# Patient Record
Sex: FEMALE | Race: WHITE | NOT HISPANIC OR LATINO | URBAN - METROPOLITAN AREA
[De-identification: names, ages, dates, MRNs, and addresses within clinical notes are randomized per-mention and may not be internally consistent; named-entity substitution may affect disease eponyms.]

---

## 2021-03-15 ENCOUNTER — INPATIENT (INPATIENT)
Facility: HOSPITAL | Age: 86
LOS: 0 days | Discharge: HOME | DRG: 690 | End: 2021-03-16
Attending: HOSPITALIST | Admitting: HOSPITALIST
Payer: COMMERCIAL

## 2021-03-15 VITALS
OXYGEN SATURATION: 100 % | DIASTOLIC BLOOD PRESSURE: 49 MMHG | SYSTOLIC BLOOD PRESSURE: 102 MMHG | HEART RATE: 64 BPM | RESPIRATION RATE: 16 BRPM | TEMPERATURE: 98 F

## 2021-03-15 DIAGNOSIS — N39.0 URINARY TRACT INFECTION, SITE NOT SPECIFIED: ICD-10-CM

## 2021-03-15 DIAGNOSIS — E11.9 TYPE 2 DIABETES MELLITUS WITHOUT COMPLICATIONS: ICD-10-CM

## 2021-03-15 DIAGNOSIS — D72.829 ELEVATED WHITE BLOOD CELL COUNT, UNSPECIFIED: ICD-10-CM

## 2021-03-15 DIAGNOSIS — N17.9 ACUTE KIDNEY FAILURE, UNSPECIFIED: ICD-10-CM

## 2021-03-15 DIAGNOSIS — I63.9 CEREBRAL INFARCTION, UNSPECIFIED: ICD-10-CM

## 2021-03-15 DIAGNOSIS — D64.9 ANEMIA, UNSPECIFIED: ICD-10-CM

## 2021-03-15 DIAGNOSIS — I10 ESSENTIAL (PRIMARY) HYPERTENSION: ICD-10-CM

## 2021-03-15 DIAGNOSIS — R53.1 WEAKNESS: ICD-10-CM

## 2021-03-15 DIAGNOSIS — R63.8 OTHER SYMPTOMS AND SIGNS CONCERNING FOOD AND FLUID INTAKE: ICD-10-CM

## 2021-03-15 DIAGNOSIS — E86.0 DEHYDRATION: ICD-10-CM

## 2021-03-15 LAB
ACANTHOCYTES BLD QL SMEAR: SLIGHT — SIGNIFICANT CHANGE UP
ALBUMIN SERPL ELPH-MCNC: 2.9 G/DL — LOW (ref 3.3–5)
ALP SERPL-CCNC: 79 U/L — SIGNIFICANT CHANGE UP (ref 40–120)
ALT FLD-CCNC: 9 U/L — LOW (ref 10–45)
ANION GAP SERPL CALC-SCNC: 12 MMOL/L — SIGNIFICANT CHANGE UP (ref 5–17)
APPEARANCE UR: CLEAR — SIGNIFICANT CHANGE UP
APTT BLD: 57.1 SEC — HIGH (ref 27.5–35.5)
AST SERPL-CCNC: 20 U/L — SIGNIFICANT CHANGE UP (ref 10–40)
BACTERIA # UR AUTO: PRESENT /HPF
BASE EXCESS BLDV CALC-SCNC: -1.4 MMOL/L — SIGNIFICANT CHANGE UP
BASOPHILS # BLD AUTO: 0.15 K/UL — SIGNIFICANT CHANGE UP (ref 0–0.2)
BASOPHILS NFR BLD AUTO: 0.9 % — SIGNIFICANT CHANGE UP (ref 0–2)
BILIRUB SERPL-MCNC: 0.5 MG/DL — SIGNIFICANT CHANGE UP (ref 0.2–1.2)
BILIRUB UR-MCNC: ABNORMAL
BUN SERPL-MCNC: 26 MG/DL — HIGH (ref 7–23)
BURR CELLS BLD QL SMEAR: PRESENT — SIGNIFICANT CHANGE UP
CA-I SERPL-SCNC: 1.15 MMOL/L — SIGNIFICANT CHANGE UP (ref 1.12–1.3)
CALCIUM SERPL-MCNC: 8.7 MG/DL — SIGNIFICANT CHANGE UP (ref 8.4–10.5)
CHLORIDE SERPL-SCNC: 98 MMOL/L — SIGNIFICANT CHANGE UP (ref 96–108)
CO2 SERPL-SCNC: 20 MMOL/L — LOW (ref 22–31)
COLOR SPEC: ABNORMAL
COMMENT - URINE: SIGNIFICANT CHANGE UP
CREAT SERPL-MCNC: 1.59 MG/DL — HIGH (ref 0.5–1.3)
DIFF PNL FLD: NEGATIVE — SIGNIFICANT CHANGE UP
EOSINOPHIL # BLD AUTO: 0 K/UL — SIGNIFICANT CHANGE UP (ref 0–0.5)
EOSINOPHIL NFR BLD AUTO: 0 % — SIGNIFICANT CHANGE UP (ref 0–6)
EPI CELLS # UR: SIGNIFICANT CHANGE UP /HPF (ref 0–5)
GAS PNL BLDA: SIGNIFICANT CHANGE UP
GAS PNL BLDV: 129 MMOL/L — LOW (ref 138–146)
GAS PNL BLDV: SIGNIFICANT CHANGE UP
GLUCOSE SERPL-MCNC: 108 MG/DL — HIGH (ref 70–99)
GLUCOSE UR QL: NEGATIVE — SIGNIFICANT CHANGE UP
HCO3 BLDV-SCNC: 24 MMOL/L — SIGNIFICANT CHANGE UP (ref 20–27)
HCT VFR BLD CALC: 29.3 % — LOW (ref 34.5–45)
HGB BLD-MCNC: 9.7 G/DL — LOW (ref 11.5–15.5)
HYPOCHROMIA BLD QL: SLIGHT — SIGNIFICANT CHANGE UP
INR BLD: 2.35 — HIGH (ref 0.88–1.16)
KETONES UR-MCNC: ABNORMAL MG/DL
LACTATE SERPL-SCNC: 0.5 MMOL/L — SIGNIFICANT CHANGE UP (ref 0.5–2)
LEUKOCYTE ESTERASE UR-ACNC: ABNORMAL
LYMPHOCYTES # BLD AUTO: 1.6 K/UL — SIGNIFICANT CHANGE UP (ref 1–3.3)
LYMPHOCYTES # BLD AUTO: 9.6 % — LOW (ref 13–44)
MANUAL SMEAR VERIFICATION: SIGNIFICANT CHANGE UP
MCHC RBC-ENTMCNC: 28.3 PG — SIGNIFICANT CHANGE UP (ref 27–34)
MCHC RBC-ENTMCNC: 33.1 GM/DL — SIGNIFICANT CHANGE UP (ref 32–36)
MCV RBC AUTO: 85.4 FL — SIGNIFICANT CHANGE UP (ref 80–100)
METAMYELOCYTES # FLD: 0.9 % — HIGH (ref 0–0)
MONOCYTES # BLD AUTO: 0.72 K/UL — SIGNIFICANT CHANGE UP (ref 0–0.9)
MONOCYTES NFR BLD AUTO: 4.3 % — SIGNIFICANT CHANGE UP (ref 2–14)
NEUTROPHILS # BLD AUTO: 14.02 K/UL — HIGH (ref 1.8–7.4)
NEUTROPHILS NFR BLD AUTO: 84.3 % — HIGH (ref 43–77)
NITRITE UR-MCNC: NEGATIVE — SIGNIFICANT CHANGE UP
NT-PROBNP SERPL-SCNC: 3661 PG/ML — HIGH (ref 0–300)
OVALOCYTES BLD QL SMEAR: SLIGHT — SIGNIFICANT CHANGE UP
PCO2 BLDV: 46 MMHG — SIGNIFICANT CHANGE UP (ref 41–51)
PH BLDV: 7.34 — SIGNIFICANT CHANGE UP (ref 7.32–7.43)
PH UR: 5 — SIGNIFICANT CHANGE UP (ref 5–8)
PLAT MORPH BLD: ABNORMAL
PLATELET # BLD AUTO: 272 K/UL — SIGNIFICANT CHANGE UP (ref 150–400)
PO2 BLDV: 19 MMHG — SIGNIFICANT CHANGE UP
POIKILOCYTOSIS BLD QL AUTO: SIGNIFICANT CHANGE UP
POLYCHROMASIA BLD QL SMEAR: SLIGHT — SIGNIFICANT CHANGE UP
POTASSIUM BLDV-SCNC: 3.7 MMOL/L — SIGNIFICANT CHANGE UP (ref 3.5–4.9)
POTASSIUM SERPL-MCNC: 3.9 MMOL/L — SIGNIFICANT CHANGE UP (ref 3.5–5.3)
POTASSIUM SERPL-SCNC: 3.9 MMOL/L — SIGNIFICANT CHANGE UP (ref 3.5–5.3)
PROT SERPL-MCNC: 5.6 G/DL — LOW (ref 6–8.3)
PROT UR-MCNC: ABNORMAL MG/DL
PROTHROM AB SERPL-ACNC: 27.1 SEC — HIGH (ref 10.6–13.6)
RBC # BLD: 3.43 M/UL — LOW (ref 3.8–5.2)
RBC # FLD: 14.3 % — SIGNIFICANT CHANGE UP (ref 10.3–14.5)
RBC BLD AUTO: ABNORMAL
RBC CASTS # UR COMP ASSIST: < 5 /HPF — SIGNIFICANT CHANGE UP
SAO2 % BLDV: 23 % — SIGNIFICANT CHANGE UP
SARS-COV-2 RNA SPEC QL NAA+PROBE: SIGNIFICANT CHANGE UP
SCHISTOCYTES BLD QL AUTO: SLIGHT — SIGNIFICANT CHANGE UP
SODIUM SERPL-SCNC: 130 MMOL/L — LOW (ref 135–145)
SP GR SPEC: >=1.03 — SIGNIFICANT CHANGE UP (ref 1–1.03)
SPHEROCYTES BLD QL SMEAR: SLIGHT — SIGNIFICANT CHANGE UP
TROPONIN T SERPL-MCNC: 0.04 NG/ML — CRITICAL HIGH (ref 0–0.01)
TROPONIN T SERPL-MCNC: 0.04 NG/ML — CRITICAL HIGH (ref 0–0.01)
UROBILINOGEN FLD QL: 1 E.U./DL — SIGNIFICANT CHANGE UP
WBC # BLD: 16.63 K/UL — HIGH (ref 3.8–10.5)
WBC # FLD AUTO: 16.63 K/UL — HIGH (ref 3.8–10.5)
WBC UR QL: ABNORMAL /HPF

## 2021-03-15 PROCEDURE — 70450 CT HEAD/BRAIN W/O DYE: CPT | Mod: 26,MA

## 2021-03-15 PROCEDURE — 71045 X-RAY EXAM CHEST 1 VIEW: CPT | Mod: 26

## 2021-03-15 PROCEDURE — 99223 1ST HOSP IP/OBS HIGH 75: CPT | Mod: GC

## 2021-03-15 PROCEDURE — 93010 ELECTROCARDIOGRAM REPORT: CPT

## 2021-03-15 PROCEDURE — 99285 EMERGENCY DEPT VISIT HI MDM: CPT

## 2021-03-15 RX ORDER — CLOPIDOGREL BISULFATE 75 MG/1
75 TABLET, FILM COATED ORAL DAILY
Refills: 0 | Status: DISCONTINUED | OUTPATIENT
Start: 2021-03-15 | End: 2021-03-16

## 2021-03-15 RX ORDER — FAMOTIDINE 10 MG/ML
20 INJECTION INTRAVENOUS DAILY
Refills: 0 | Status: DISCONTINUED | OUTPATIENT
Start: 2021-03-15 | End: 2021-03-16

## 2021-03-15 RX ORDER — SODIUM CHLORIDE 9 MG/ML
1000 INJECTION INTRAMUSCULAR; INTRAVENOUS; SUBCUTANEOUS ONCE
Refills: 0 | Status: COMPLETED | OUTPATIENT
Start: 2021-03-15 | End: 2021-03-15

## 2021-03-15 RX ORDER — LEVETIRACETAM 250 MG/1
250 TABLET, FILM COATED ORAL
Refills: 0 | Status: DISCONTINUED | OUTPATIENT
Start: 2021-03-15 | End: 2021-03-16

## 2021-03-15 RX ORDER — ATORVASTATIN CALCIUM 80 MG/1
40 TABLET, FILM COATED ORAL AT BEDTIME
Refills: 0 | Status: DISCONTINUED | OUTPATIENT
Start: 2021-03-15 | End: 2021-03-16

## 2021-03-15 RX ORDER — LEVETIRACETAM 250 MG/1
750 TABLET, FILM COATED ORAL ONCE
Refills: 0 | Status: COMPLETED | OUTPATIENT
Start: 2021-03-15 | End: 2021-03-15

## 2021-03-15 RX ORDER — BUPROPION HYDROCHLORIDE 150 MG/1
150 TABLET, EXTENDED RELEASE ORAL DAILY
Refills: 0 | Status: DISCONTINUED | OUTPATIENT
Start: 2021-03-15 | End: 2021-03-16

## 2021-03-15 RX ORDER — CEFTRIAXONE 500 MG/1
1000 INJECTION, POWDER, FOR SOLUTION INTRAMUSCULAR; INTRAVENOUS EVERY 24 HOURS
Refills: 0 | Status: DISCONTINUED | OUTPATIENT
Start: 2021-03-15 | End: 2021-03-16

## 2021-03-15 RX ORDER — HEPARIN SODIUM 5000 [USP'U]/ML
5000 INJECTION INTRAVENOUS; SUBCUTANEOUS EVERY 8 HOURS
Refills: 0 | Status: DISCONTINUED | OUTPATIENT
Start: 2021-03-15 | End: 2021-03-16

## 2021-03-15 RX ADMIN — CEFTRIAXONE 100 MILLIGRAM(S): 500 INJECTION, POWDER, FOR SOLUTION INTRAMUSCULAR; INTRAVENOUS at 21:40

## 2021-03-15 RX ADMIN — HEPARIN SODIUM 5000 UNIT(S): 5000 INJECTION INTRAVENOUS; SUBCUTANEOUS at 21:28

## 2021-03-15 RX ADMIN — SODIUM CHLORIDE 1000 MILLILITER(S): 9 INJECTION INTRAMUSCULAR; INTRAVENOUS; SUBCUTANEOUS at 12:50

## 2021-03-15 RX ADMIN — LEVETIRACETAM 400 MILLIGRAM(S): 250 TABLET, FILM COATED ORAL at 13:01

## 2021-03-15 RX ADMIN — ATORVASTATIN CALCIUM 40 MILLIGRAM(S): 80 TABLET, FILM COATED ORAL at 21:28

## 2021-03-15 NOTE — ED PROVIDER NOTE - PHYSICAL EXAMINATION
VITAL SIGNS: I have reviewed nursing notes and confirm.  CONSTITUTIONAL: Thin, elderly female; in no acute distress.   SKIN:  warm and dry, no acute rash.   HEAD:  normocephalic, atraumatic.  EYES: EOM intact; conjunctiva and sclera clear.  ENT: No nasal discharge; airway clear.   NECK: Supple; non tender.  CARD: S1, S2 normal; no murmurs, gallops, or rubs. Regular rate and rhythm.   RESP:  Clear to auscultation b/l, no wheezes, rales or rhonchi.  ABD: Normal bowel sounds; soft; non-distended; non-tender; no guarding/ rebound.  EXT: Normal ROM. No clubbing, cyanosis or edema. 2+ pulses to b/l ue/le.  NEURO: Right sided gaze preference. Awakens when spoken to. Firm right hand grasp. Unable to move RLE and LLE. Weakness of the LUE compared to the RUE.   PSYCH: Cooperative, mood and affect appropriate. VITAL SIGNS: I have reviewed nursing notes and confirm.  CONSTITUTIONAL: Thin, elderly female; in no acute distress.   SKIN:  warm and dry, no acute rash.   HEAD:  normocephalic, atraumatic.  EYES: conjunctiva and sclera clear. + r sided gaze preference.  ENT: No nasal discharge; airway clear. Dry mm.  NECK: Supple; non tender.  CARD: S1, S2 normal; no murmurs, gallops, or rubs. Regular rate and rhythm.   RESP:  Clear to auscultation b/l, no wheezes, rales or rhonchi.  ABD: Normal bowel sounds; soft; non-distended; non-tender; no guarding/ rebound.  EXT: No clubbing, cyanosis or edema. 2+ pulses to b/l ue/le.  NEURO: + Right sided gaze preference. Opens eyes to sternal rub and when spoken to. + Firm right hand grasp. Unable to move RLE. + LUE/ LLE weakness 2/2 prior cva.

## 2021-03-15 NOTE — H&P ADULT - PROBLEM SELECTOR PLAN 7
-on home HCTZ 25mg and ramipril 20mg  -holding antihypertensives given pts dehydration and possible LYSSA  -restart as appropriate -Pt with history of prior CVA with residual L sided deficits  -c/w ASA 81mg, Plavix 75mg, Atorvastatin 40mg  -Seen by Stroke Neurology on this admission, NIHSS 18    #MDD  -c/w bupropion 150mg

## 2021-03-15 NOTE — H&P ADULT - NSHPLABSRESULTS_GEN_ALL_CORE
LABS:                        9.7    16.63 )-----------( 272      ( 15 Mar 2021 13:32 )             29.3     03-15    130<L>  |  98  |  26<H>  ----------------------------<  108<H>  3.9   |  20<L>  |  1.59<H>    Ca    8.7      15 Mar 2021 13:32    TPro  5.6<L>  /  Alb  2.9<L>  /  TBili  0.5  /  DBili  x   /  AST  20  /  ALT  9<L>  /  AlkPhos  79  03-15    PT/INR - ( 15 Mar 2021 13:32 )   PT: 27.1 sec;   INR: 2.35          PTT - ( 15 Mar 2021 13:32 )  PTT:57.1 sec  Lactate, Blood: 0.5 mmol/L (03-15-21 @ 13:32)    CARDIAC MARKERS ( 15 Mar 2021 16:40 )  x     / 0.04 ng/mL / x     / x     / x      CARDIAC MARKERS ( 15 Mar 2021 13:32 )  x     / 0.04 ng/mL / x     / x     / x          Urinalysis Basic - ( 15 Mar 2021 15:35 )    Color: Brown / Appearance: Clear / SG: >=1.030 / pH: x  Gluc: x / Ketone: Trace mg/dL  / Bili: Small / Urobili: 1.0 E.U./dL   Blood: x / Protein: Trace mg/dL / Nitrite: NEGATIVE   Leuk Esterase: Trace / RBC: < 5 /HPF / WBC 5-10 /HPF   Sq Epi: x / Non Sq Epi: 0-5 /HPF / Bacteria: Present /HPF      Blood Gas Profile w/Lytes - Venous: Performed in Lab (03-15-21 @ 16:42)  Blood Gas Arterial - Lytes,H+H,iCa,Lact: Performed in Lab (03-15-21 @ 13:43)      EKG: Reviewed.    RADIOLOGY & ADDITIONAL TESTS: Reviewed.

## 2021-03-15 NOTE — ED PROVIDER NOTE - CLINICAL SUMMARY MEDICAL DECISION MAKING FREE TEXT BOX
Impression: Acute lethargy, hypotension at home, with right sided gaze preference; weakness to RLE noted on arrival to ED. Finger stick WNL. Stroke code activated. Pt seen by stroke team.  Last known normal? 10 PM last night when pt went to bed.   CT head is - for acute CVA or bleeding. Pt has chronic infarction to the MCA region. Symptoms are unlikely to be related to acute CVA as per Dr. Quigley.  ? Seizure causing right sided gaze preference.  will load with levETIRAcetam 750 mg. Additional workup including labs, UA, and chest xray. Impression: Acute lethargy, hypotension at home, with right sided gaze preference; weakness to RLE noted on arrival to ED. Finger stick WNL. Stroke code activated. Pt seen by stroke team.  Last known normal 10 PM last night when pt went to bed?   CT head is - for acute CVA or bleeding. Pt has chronic infarction to the MCA region. Symptoms are unlikely to be related to acute CVA as per Dr. Quigley.  Seizure causing right sided gaze preference?  will load with levETIRAcetam 750 mg. Additional workup including labs, UA, and chest xray. Impression: Acute lethargy, hypotension at home, with right sided gaze preference; weakness to RLE noted on arrival to ED. Finger stick WNL. Stroke code activated. Pt seen by stroke team.  ? Last known normal 10 PM last night when pt went to .  CT head is - for acute CVA or bleeding. Pt has chronic infarction to the MCA region. Symptoms are unlikely to be related to acute CVA as per Dr. Quigley.  ? Seizure causing right sided gaze preference.  will load with levETIRAcetam 750 mg. Additional workup including labs, UA, and chest xray. Impression: lethargy, weakness, borderline hypotension at home, with right sided gaze preference and weakness to RLE noted in ED. + h/o cva with L sided weakness. Finger stick WNL. Stroke code activated and pt seen by stroke team. Last known normal ? 10 PM last night when pt went to sleep.  Non-con CT head is - for acute CVA or bleeding. CVA unlikely and pt is not a tPA candidate. CTA H/N and CTP cancelled. ? sx's 2/2 seizure. Pt loaded with keppra iv. Labs notable for leukocytosis to 16, yola, mild elev of trop. EKG non-ischemic. Pt hydrated with ivf with significant improvement of mental status. Suspect dehydration 2/2 profuse diarrhea last night. Case d/w stroke and epilepsy; will admit to regional medicine for further management. Impression: lethargy, weakness, borderline hypotension at home, with right sided gaze preference and weakness to RLE noted in ED. + h/o cva with L sided weakness. Finger stick WNL. Stroke code activated and pt seen by stroke team. Last known normal ? 10 PM last night when pt went to sleep.  Non-con CT head is - for acute CVA or bleeding. CVA unlikely and pt is not a tPA candidate. CTA H/N and CTP cancelled. ? sx's 2/2 seizure. Pt loaded with keppra iv. Labs notable for leukocytosis to 16, yola, mild elev of trop. EKG non-ischemic. Repeat trop unchanged. Do not suspect acs. Pt hydrated with ivf with significant improvement of mental status. Suspect dehydration 2/2 profuse diarrhea last night. Case d/w stroke and epilepsy; will admit to regional medicine for further management and work up including video eeg.

## 2021-03-15 NOTE — H&P ADULT - PROBLEM SELECTOR PLAN 1
Weakness likely secondary to dehydration 2/2 profuse diarrhea. Less likely acute CVA/TIA, less likely seizure, less likely cardiac event as EKG sinus nancie, trop stable, and pt back to baseline per HHA at bedside s/p fluid resuscitation 1L NS  -Stroke neuro consulted in ED, suggested possible seizure so pt was Keppra loaded  -low suspicion of seizure  -encourage PO intake, after bedside dysphagia screen  -continue to monitor for further episodes of diarrhea Weakness likely secondary to dehydration 2/2 profuse diarrhea. Less likely acute CVA/TIA, less likely seizure, less likely cardiac event as EKG sinus nancie, trop stable, and pt back to baseline per HHA at bedside s/p fluid resuscitation 1L NS  -Stroke neuro consulted in ED, suggested possible seizure so pt was Keppra loaded  -low suspicion of seizure  -f/u Stroke Neuro recs  -encourage PO intake, after bedside dysphagia screen  -continue to monitor for further episodes of diarrhea

## 2021-03-15 NOTE — ED ADULT TRIAGE NOTE - CHIEF COMPLAINT QUOTE
daniela from home.  HHA called ems.  Patient found non-verbal, hypotensive at around 830-9am.  Hx cva 3-4 months ago.  Per HHA patient also constipated x few days, given enema by night shift HHA, now is having diarrhea.  Patient has been bed-bound x few months

## 2021-03-15 NOTE — H&P ADULT - PROBLEM SELECTOR PLAN 10
F: maintenance fluids if pt fails dysphagia screen  E: replete PRN  N: mechanical soft diet if pt passes dysphagia screen, f/u speech and swallow eval  GI ppx: famotidine 20mg  DVT ppx: heparin subcu  CODE: DNR, need to clarify rest of GOC  DISPO: RMF

## 2021-03-15 NOTE — H&P ADULT - ATTENDING COMMENTS
reviewed pertinent data , h&p  patient seen and examined overnight ; HHA present at bedside, gave hx; pt had BM in ED prior to coming to floor, stool was formed followed by loose stools.     pt in NAD, noncooperative w/ exam; answers in yes or no answers to any questions, responds in different languages interspersed with English, nonsensical; refused to open eyes, on prying eyes open there appears to be conjunctival injection b/l, no overt discharge noted on eyelids; mm are slightly dry. s1s2, lungs cta b/l anteriorly ; abdomen soft/nt/nd; no lower ext edema/ tenderness b/l; pt w/ left arm contraction     1. weakness in setting of dehydration s/p diarrhea from enema use. concern for possible seizure as well possible UTI. on IVFs, hyponatremia improving, monitor BMP for sodium and renal fxn. c/w ceftriaxone, followup ctxs. monitor stools, if worsening diarrhea check stool studies including for c.diff. started on keppra per neuro recs. followup VEEG. pt w/ chronic constipation (can be constipated for up to a week, current episode 5-6 days), will need adequate bowel regimen prevent reccuring diarrheal episodes s/p enema (possible element of stool overflow incontinence from impaction ).        rest of  plan as above reviewed pertinent data , h&p  patient seen and examined overnight ; HHA present at bedside, gave hx; pt had BM in ED prior to coming to floor, stool was formed followed by loose stools.     pt in NAD, noncooperative w/ exam; answers in yes or no answers to any questions, responds in different languages interspersed with English, nonsensical; refused to open eyes, on prying eyes open there appears to be conjunctival injection b/l, no overt discharge noted on eyelids; mm are slightly dry. s1s2, lungs cta b/l anteriorly ; abdomen soft/nt/nd; no lower ext edema/ tenderness b/l; pt w/ left arm contraction     1. weakness in setting of dehydration s/p diarrhea from enema use. concern for possible seizure as well possible UTI. on IVFs, hyponatremia improving, monitor BMP for sodium and renal fxn. c/w ceftriaxone, followup ctxs. monitor stools, if worsening diarrhea check stool studies including for c.diff. started on keppra per neuro recs. followup VEEG. pt w/ chronic constipation (can be constipated for up to a week, current episode 5-6 days), will need adequate bowel regimen prevent reccuring diarrheal episodes s/p enema (possible element of stool overflow incontinence from impaction ).      2. elevated INR/ PTT: cause unclear, pt not on AC, possibly from malnutrition, coagulation d/o, acquired factor deficiency; monitor coags, may need heme evaluation, mixing studies     rest of  plan as above

## 2021-03-15 NOTE — H&P ADULT - PROBLEM SELECTOR PLAN 9
F: maintenance fluids if pt fails dysphagia screen  E: replete PRN  N: mechanical soft diet if pt passes dysphagia screen  GI ppx: famotidine 20mg  DVT ppx: heparin subcu  CODE: DNR, need to clarify rest of GOC  DISPO: NIKI F: maintenance fluids if pt fails dysphagia screen  E: replete PRN  N: mechanical soft diet if pt passes dysphagia screen, f/u speech and swallow eval  GI ppx: famotidine 20mg  DVT ppx: heparin subcu  CODE: DNR, need to clarify rest of GOC  DISPO: RMF -f/u a1c  -ISS if needed

## 2021-03-15 NOTE — CONSULT NOTE ADULT - ASSESSMENT
87F with PMHx of DM, HLD, CVA w L sided weakness BIBA from home with lethargy and weakness as noted by 24hr HHA. Per the aide, she had been constipated last night and given miralax and an enema and ended up with profuse diarrhea overnight, found to be difficult to rouse. Unclear last known well, patient was seen by overnight aid at 8pm was at baseline. She is MRS 5, bedbound at baseline. Overnight she was having extensive diarrhea. This morning aid noted that patient did not look well and was worried by /49 so brought patient to hospital. On ED provider exam noted right gaze preference and right leg weakness, stroke code called. NIHSS 18. HCT showed no acute infarct/hemorrhage, remote bilateral MCA distribution chronic areas of infarction without apparent site of acute cortical infarct. A lacunar infarct in the left thalamocapsular region is of indeterminate age. Loaded keppra 750mg in ED. Patient was mute unable to obtain ROS.  87F with PMHx of DM, HLD, CVA w L sided weakness BIBA from home with lethargy and weakness and hypotension as noted by 24hr HHA. Per the aide, she had been constipated last night and given miralax and an enema and ended up with profuse diarrhea overnight, found to be difficult to rouse. On ED provider exam noted right gaze preference and right leg weakness, stroke code called. NIHSS 18. HCT showed no acute infarct/hemorrhage, remote bilateral MCA distribution chronic areas of infarction without apparent site of acute cortical infarct. A lacunar infarct in the left thalamocapsular region is of indeterminate age. Loaded keppra 750mg in ED. Concern for seizures or possible metabolic encephalopathy less likely stroke.    - no need for further inpatient stroke workup  - s/p keppra 750mg load, start keppra 250mg BID  - obtain eeg  - Thank you for allowing us to participate in the care of this patient, please call Neurology with questions as needed  - dispo per ED

## 2021-03-15 NOTE — H&P ADULT - ASSESSMENT
87F with PMHx of DM, HLD, CVA w residual L sided weakness BIBA from home with lethargy and weakness after multiple episodes of diarrhea, admitted to [facility] for fluid resuscitation and further management.

## 2021-03-15 NOTE — H&P ADULT - HISTORY OF PRESENT ILLNESS
87F with PMHx of DM, HLD, CVA w L sided weakness BIBA from home with lethargy and weakness as noted by 24hr HHA. Per the aide, she had been constipated last night and given  87F with PMHx of DM, HLD, CVA w L sided weakness BIBA from home with lethargy and weakness as noted by 24hr HHA. Per the aide, she had been constipated last night and given miralax and an enema and ended up with profuse diarrhea overnight, found to be difficult to rouse.    ED Vitals: Tmax 98.2F HR 50-60s -120/40-50 RR 16 SpO2 100% on 2L NC  ED Labs: WBC 16.63 with neutrophilic predominance, H/H 9.7/29.3, BUN/Cr 26/1.59, UA: spec grav 1.030, trace leuk esterase WBC 5-10 Bacteria present  CTH: Remote bilateral MCA distribution chronic areas of infarction without apparent site of acute cortical infarct. A lacunar infarct in the left thalamocapsular region is of indeterminate age.  CXR: Heart size within normal limits, thoracic aortic calcification. Left basilar focal atelectasis, elevation of the left hemidiaphragm. Right perihilar focal atelectasis.. Thoracic spine and bilateral shoulder degenerative changes, levoscoliosis. Right [place] shoulder.  Interventions: 1L NS, 750mg Keppra History obtained from chart and HHA at bedside.  87F with PMHx of DM, HLD, CVA w residual L sided weakness BIBA from home with lethargy and weakness as noted by 24hr HHA. Per the aide, she had been constipated on Sunday, given miralax and an enema and ended up with profuse diarrhea overnight (>5 episodes, watery nonbloody), found to be hypotensive with SBP in the 90s, difficult to rouse this morning. Per HHA, pt normally has at least 1BM per week but requires weekly enemas. No recent sick contacts, no recent travel, no recent antibiotic use, no fevers, no URI symptoms, no CP, no SOB, no abd pain, no dysuria. Patient is bedbound, homebound with 24/7 HHA.     ED Vitals: Tmax 98.2F HR 50-60s -120/40-50 RR 16 SpO2 100% on 2L NC  ED Labs: WBC 16.63 with neutrophilic predominance, H/H 9.7/29.3, BUN/Cr 26/1.59, UA: spec grav 1.030, trace leuk esterase WBC 5-10 Bacteria present  CTH: Remote bilateral MCA distribution chronic areas of infarction without apparent site of acute cortical infarct. A lacunar infarct in the left thalamocapsular region is of indeterminate age.  CXR: Heart size within normal limits, thoracic aortic calcification. Left basilar focal atelectasis, elevation of the left hemidiaphragm. Right perihilar focal atelectasis.. Thoracic spine and bilateral shoulder degenerative changes, levoscoliosis. Right [place] shoulder.  Interventions: 1L NS, 750mg Keppra History obtained from chart and HHA at bedside.  87F with PMHx of DM, HLD, CVA w residual L sided weakness BIBA from home with lethargy and weakness as noted by 24hr HHA. Per the aide, she had been constipated on Sunday, given miralax and an enema and ended up with profuse diarrhea overnight (>5 episodes, watery nonbloody), found to be hypotensive with SBP in the 90s, difficult to rouse this morning. Per HHA, pt normally has at least 1BM per week but requires weekly enemas. No recent sick contacts, no recent travel, no recent antibiotic use, no fevers, no URI symptoms, no CP, no SOB, no abd pain, no dysuria. Patient is bedbound, homebound with 24/7 HHA.     ED Vitals: Tmax 98.2F HR 50-60s -120/40-50 RR 16 SpO2 100% on 2L NC  ED Labs: WBC 16.63 with neutrophilic predominance, H/H 9.7/29.3, BUN/Cr 26/1.59, UA: spec grav 1.030, trace leuk esterase WBC 5-10 Bacteria present  CTH: Remote bilateral MCA distribution chronic areas of infarction without apparent site of acute cortical infarct. A lacunar infarct in the left thalamocapsular region is of indeterminate age.  CXR: Heart size within normal limits, thoracic aortic calcification. Left basilar focal atelectasis, elevation of the left hemidiaphragm. Right perihilar focal atelectasis. Thoracic spine and bilateral shoulder degenerative changes, levoscoliosis. Right [place] shoulder.  Interventions: 1L NS, Stroke code was called in the ED, NIHSS 18, Pt loaded with 750mg Keppra for presumed seizures

## 2021-03-15 NOTE — H&P ADULT - PROBLEM SELECTOR PLAN 4
-H/H 9.7/29.3 (unknown baseline), normocytic anemia  -f/u iron studies -Cr 1.59 on admission, unknown baseline  -obtain collateral from PMD Dr. Jannette Winters [phone] in am  -continue to monitor BMP, next BMP 10pm  -encourage PO fluid intake after dysphagia screen, otherwise continue with gentle hydration  -f/u urine lytes    ADDENDUM:   #hyponatremia: monitor BMP s/p NS boluses to avoid sodium  overcorrection; start maintenance IVFs pending repeat sodium levels

## 2021-03-15 NOTE — CONSULT NOTE ADULT - SUBJECTIVE AND OBJECTIVE BOX
**STROKE CODE CONSULT NOTE**    Last known well time/Time of onset of symptoms: 3/14/2021 unknown    HPI: 87F with PMHx of DM, HLD, CVA w L sided weakness BIBA from home with lethargy and weakness as noted by 24hr HHA. Per the aide, she had been constipated last night and given miralax and an enema and ended up with profuse diarrhea overnight, found to be difficult to rouse. Unclear last known well, patient was seen by overnight aid at 8pm was at baseline. She is MRS 5, bedbound at baseline. Overnight she was having extensive diarrhea. This morning aid noted that patient did not look well and was worried by /49 so brought patient to hospital. On ED provider exam noted right gaze preference and right leg weakness, stroke code called. NIHSS 18. HCT showed no acute infarct/hemorrhage, remote bilateral MCA distribution chronic areas of infarction without apparent site of acute cortical infarct. A lacunar infarct in the left thalamocapsular region is of indeterminate age. Loaded keppra 750mg in ED. Patient was mute unable to obtain ROS.     T(C): 36.8 (03-15-21 @ 12:45), Max: 36.8 (03-15-21 @ 12:45)  HR: 64 (03-15-21 @ 18:30) (57 - 64)  BP: 131/59 (03-15-21 @ 18:30) (102/49 - 131/59)  RR: 17 (03-15-21 @ 18:30) (16 - 18)  SpO2: 100% (03-15-21 @ 18:30) (100% - 100%)    PAST MEDICAL & SURGICAL HISTORY:      FAMILY HISTORY:      SOCIAL HISTORY:      MEDICATIONS  (STANDING):  heparin   Injectable 5000 Unit(s) SubCutaneous every 8 hours    MEDICATIONS  (PRN):    Allergies    morphine (Other)    Intolerances      Vital Signs Last 24 Hrs  T(C): 36.8 (15 Mar 2021 12:45), Max: 36.8 (15 Mar 2021 12:45)  T(F): 98.2 (15 Mar 2021 12:45), Max: 98.2 (15 Mar 2021 12:45)  HR: 64 (15 Mar 2021 18:30) (57 - 64)  BP: 131/59 (15 Mar 2021 18:30) (102/49 - 131/59)  BP(mean): --  RR: 17 (15 Mar 2021 18:30) (16 - 18)  SpO2: 100% (15 Mar 2021 18:30) (100% - 100%)    Physical exam:  *************************************    NIHSS: 18 ASPECT Score: 7    Fingerstick Blood Glucose: CAPILLARY BLOOD GLUCOSE  118 (15 Mar 2021 19:26)      POCT Blood Glucose.: 118 mg/dL (15 Mar 2021 12:06)    LABS:                        9.7    16.63 )-----------( 272      ( 15 Mar 2021 13:32 )             29.3     03-15    130<L>  |  98  |  26<H>  ----------------------------<  108<H>  3.9   |  20<L>  |  1.59<H>    Ca    8.7      15 Mar 2021 13:32    TPro  5.6<L>  /  Alb  2.9<L>  /  TBili  0.5  /  DBili  x   /  AST  20  /  ALT  9<L>  /  AlkPhos  79  03-15    PT/INR - ( 15 Mar 2021 13:32 )   PT: 27.1 sec;   INR: 2.35          PTT - ( 15 Mar 2021 13:32 )  PTT:57.1 sec  CARDIAC MARKERS ( 15 Mar 2021 16:40 )  x     / 0.04 ng/mL / x     / x     / x      CARDIAC MARKERS ( 15 Mar 2021 13:32 )  x     / 0.04 ng/mL / x     / x     / x          Urinalysis Basic - ( 15 Mar 2021 15:35 )    Color: Brown / Appearance: Clear / SG: >=1.030 / pH: x  Gluc: x / Ketone: Trace mg/dL  / Bili: Small / Urobili: 1.0 E.U./dL   Blood: x / Protein: Trace mg/dL / Nitrite: NEGATIVE   Leuk Esterase: Trace / RBC: < 5 /HPF / WBC 5-10 /HPF   Sq Epi: x / Non Sq Epi: 0-5 /HPF / Bacteria: Present /HPF        RADIOLOGY & ADDITIONAL STUDIES:  < from: CT Brain Stroke Protocol (03.15.21 @ 13:52) >    IMPRESSION:    Remote bilateral MCA distribution chronic areas of infarction without apparent site of acute cortical infarct. A lacunar infarct in the left thalamocapsular region is of indeterminate age.    Study was performed at 12:13 PM. Results were discussed with NICOLE Lujan at 12:15 PM.    < end of copied text >      -----------------------------------------------------------------------------------------------------------------  IV-tPA (Y/N):   no out of window    **STROKE CODE CONSULT NOTE**    Last known well time/Time of onset of symptoms: 3/14/2021 unknown    HPI: 87F with PMHx of DM, HLD, CVA w L sided weakness BIBA from home with lethargy and weakness as noted by 24hr HHA. Per the aide, she had been constipated last night and given miralax and an enema and ended up with profuse diarrhea overnight, found to be difficult to rouse. Unclear last known well, patient was seen by overnight aid at 8pm was at baseline. She is MRS 5, bedbound at baseline. Overnight she was having extensive diarrhea. This morning aid noted that patient did not look well and was worried by /49 so brought patient to hospital. On ED provider exam noted right gaze preference and right leg weakness, stroke code called. NIHSS 18. HCT showed no acute infarct/hemorrhage, remote bilateral MCA distribution chronic areas of infarction without apparent site of acute cortical infarct. A lacunar infarct in the left thalamocapsular region is of indeterminate age. Loaded keppra 750mg in ED. Patient was mute unable to obtain ROS.     T(C): 36.8 (03-15-21 @ 12:45), Max: 36.8 (03-15-21 @ 12:45)  HR: 64 (03-15-21 @ 18:30) (57 - 64)  BP: 131/59 (03-15-21 @ 18:30) (102/49 - 131/59)  RR: 17 (03-15-21 @ 18:30) (16 - 18)  SpO2: 100% (03-15-21 @ 18:30) (100% - 100%)    PAST MEDICAL & SURGICAL HISTORY:      FAMILY HISTORY:      SOCIAL HISTORY:      MEDICATIONS  (STANDING):  heparin   Injectable 5000 Unit(s) SubCutaneous every 8 hours    MEDICATIONS  (PRN):    Allergies    morphine (Other)    Intolerances      Vital Signs Last 24 Hrs  T(C): 36.8 (15 Mar 2021 12:45), Max: 36.8 (15 Mar 2021 12:45)  T(F): 98.2 (15 Mar 2021 12:45), Max: 98.2 (15 Mar 2021 12:45)  HR: 64 (15 Mar 2021 18:30) (57 - 64)  BP: 131/59 (15 Mar 2021 18:30) (102/49 - 131/59)  BP(mean): --  RR: 17 (15 Mar 2021 18:30) (16 - 18)  SpO2: 100% (15 Mar 2021 18:30) (100% - 100%)    Physical exam:  -Mental status: Stuporous, mute, does not follow commands, eyes open not tracking  -Cranial nerves:   II: Visual fields are full to confrontation.  III, IV, VI: right gaze preference able to overcome. Pupils equally round and reactive to light  VII: Face appears symmetric   Motor/sensation: Normal bulk and tone. No pronator drift. Bilateral arms are 3/5 with no asymmetry on downward drift, bilateral legs 3/5 with no asymmetry on noxious. Grimaces briskly to noxious in all 4 extremities   Coordination: unable to test     NIHSS: 18 ASPECT Score: 7    Fingerstick Blood Glucose: CAPILLARY BLOOD GLUCOSE  118 (15 Mar 2021 19:26)      POCT Blood Glucose.: 118 mg/dL (15 Mar 2021 12:06)    LABS:                        9.7    16.63 )-----------( 272      ( 15 Mar 2021 13:32 )             29.3     03-15    130<L>  |  98  |  26<H>  ----------------------------<  108<H>  3.9   |  20<L>  |  1.59<H>    Ca    8.7      15 Mar 2021 13:32    TPro  5.6<L>  /  Alb  2.9<L>  /  TBili  0.5  /  DBili  x   /  AST  20  /  ALT  9<L>  /  AlkPhos  79  03-15    PT/INR - ( 15 Mar 2021 13:32 )   PT: 27.1 sec;   INR: 2.35          PTT - ( 15 Mar 2021 13:32 )  PTT:57.1 sec  CARDIAC MARKERS ( 15 Mar 2021 16:40 )  x     / 0.04 ng/mL / x     / x     / x      CARDIAC MARKERS ( 15 Mar 2021 13:32 )  x     / 0.04 ng/mL / x     / x     / x          Urinalysis Basic - ( 15 Mar 2021 15:35 )    Color: Brown / Appearance: Clear / SG: >=1.030 / pH: x  Gluc: x / Ketone: Trace mg/dL  / Bili: Small / Urobili: 1.0 E.U./dL   Blood: x / Protein: Trace mg/dL / Nitrite: NEGATIVE   Leuk Esterase: Trace / RBC: < 5 /HPF / WBC 5-10 /HPF   Sq Epi: x / Non Sq Epi: 0-5 /HPF / Bacteria: Present /HPF        RADIOLOGY & ADDITIONAL STUDIES:  < from: CT Brain Stroke Protocol (03.15.21 @ 13:52) >    IMPRESSION:    Remote bilateral MCA distribution chronic areas of infarction without apparent site of acute cortical infarct. A lacunar infarct in the left thalamocapsular region is of indeterminate age.    Study was performed at 12:13 PM. Results were discussed with NICOLE Lujan at 12:15 PM.    < end of copied text >      -----------------------------------------------------------------------------------------------------------------  IV-tPA (Y/N):   no out of window

## 2021-03-15 NOTE — H&P ADULT - PROBLEM SELECTOR PLAN 2
Pt with multiple episodes of diarrhea s/p enema and miralax, after being constipated for 1 week. With marked improvement s/p fluid resuscitation  -encourage PO intake after dysphagia screen  -can continue with IV maintenance fluids Pt with multiple episodes of diarrhea s/p enema and miralax, after being constipated for 1 week. With marked improvement s/p fluid resuscitation  -encourage PO intake after dysphagia screen  -can continue with IV maintenance fluids    ADDENDUM:   #diarrhea: s/p enema, monitor for recurrence, check stool studies if persisting, check abdominal imaging if worsening signs/sxs

## 2021-03-15 NOTE — H&P ADULT - NSICDXPASTMEDICALHX_GEN_ALL_CORE_FT
PAST MEDICAL HISTORY:  Cerebrovascular accident (CVA)     Diabetes     HLD (hyperlipidemia)     HTN (hypertension)

## 2021-03-15 NOTE — ED PROVIDER NOTE - CARE PLAN
Principal Discharge DX:	Dehydration  Secondary Diagnosis:	LYSSA (acute kidney injury)  Secondary Diagnosis:	Weakness

## 2021-03-15 NOTE — H&P ADULT - NSHPSOCIALHISTORY_GEN_ALL_CORE
Pt lives at home with 24/7 HHA, bedbound, sister Shahnaz Haney and HCP Chiqui Og jointly make health care decisions.

## 2021-03-15 NOTE — H&P ADULT - NSHPPHYSICALEXAM_GEN_ALL_CORE
VITAL SIGNS:  ICU Vital Signs Last 24 Hrs  T(C): 36.8 (15 Mar 2021 12:45), Max: 36.8 (15 Mar 2021 12:45)  T(F): 98.2 (15 Mar 2021 12:45), Max: 98.2 (15 Mar 2021 12:45)  HR: 63 (15 Mar 2021 16:30) (57 - 64)  BP: 129/58 (15 Mar 2021 16:30) (102/49 - 129/58)  BP(mean): --  ABP: --  ABP(mean): --  RR: 18 (15 Mar 2021 16:30) (16 - 18)  SpO2: 100% (15 Mar 2021 16:30) (100% - 100%)    CAPILLARY BLOOD GLUCOSE      POCT Blood Glucose.: 118 mg/dL (15 Mar 2021 12:06)      PHYSICAL EXAM:  Constitutional: resting comfortably in bed, NAD  HEENT: NC/AT; PERRL, anicteric sclera; no oropharyngeal erythema or exudates; MMM  Neck: supple, no appreciable JVD  Respiratory: CTA B/L, no W/R/R; respirations appear non-labored, conversive in full sentences  Cardiovascular: +S1/S2, RRR  Gastrointestinal: abdomen soft, NT/ND  Extremities: WWP; no clubbing, cyanosis or edema  Vascular: 2+ radial, femoral, and DP/PT pulses B/L  Dermatologic: skin normal color and turgor; no visible rashes  Neurological: VITAL SIGNS:  ICU Vital Signs Last 24 Hrs  T(C): 36.8 (15 Mar 2021 12:45), Max: 36.8 (15 Mar 2021 12:45)  T(F): 98.2 (15 Mar 2021 12:45), Max: 98.2 (15 Mar 2021 12:45)  HR: 63 (15 Mar 2021 16:30) (57 - 64)  BP: 129/58 (15 Mar 2021 16:30) (102/49 - 129/58)  BP(mean): --  ABP: --  ABP(mean): --  RR: 18 (15 Mar 2021 16:30) (16 - 18)  SpO2: 100% (15 Mar 2021 16:30) (100% - 100%)    CAPILLARY BLOOD GLUCOSE      POCT Blood Glucose.: 118 mg/dL (15 Mar 2021 12:06)      PHYSICAL EXAM:  Constitutional: resting comfortably in bed, NAD, WDWN,   HEENT: NC/AT; PERRL, anicteric sclera; no oropharyngeal erythema or exudates; dry MM  Neck: supple, no appreciable JVD  Respiratory: CTA B/L, no W/R/R; respirations appear non-labored, conversive in full sentences  Cardiovascular: +S1/S2, bradycardic, systolic ejection murmur heard loudest in R upper sternal border  Gastrointestinal: abdomen soft, NT/ND  Extremities: WWP; no clubbing, cyanosis or edema  Vascular: 2+ radial, DP/PT pulses B/L  Dermatologic: skin normal color and turgor; no visible rashes, no pressure ulcers  Neurological: AAOx1-2, pt not fully participatory with interview and neuro exam, intermittently lapsing into babbling (baseline according HHA), CN grossly intact, moves extremities spontaneously but difficult to assess strength

## 2021-03-15 NOTE — H&P ADULT - PROBLEM SELECTOR PLAN 8
-Pt with history of prior CVA with residual L sided deficits  -c/w ASA 81mg, Plavix 75mg, Atorvastatin 40mg    #MDD  -c/w bupropion 150mg -Pt with history of prior CVA with residual L sided deficits  -c/w ASA 81mg, Plavix 75mg, Atorvastatin 40mg  -Seen by Stroke Neurology on this admission, NIHSS 18    #MDD  -c/w bupropion 150mg -f/u a1c  -ISS if needed -Pt with history of prior CVA with residual L sided deficits  -c/w ASA 81mg, Plavix 75mg, Atorvastatin 40mg  -Seen by Stroke Neurology on this admission, NIHSS 18      #MDD  -c/w bupropion 150mg    ADDENDUM:   #dementia: possibly vascular s/p CVA; currently at baseline, check b12/ folate/ TSH

## 2021-03-15 NOTE — H&P ADULT - PROBLEM SELECTOR PLAN 3
-Cr 1.59 on admission, unknown baseline  -obtain collateral from PMD Dr. Jannette Winters [phone] in am  -continue to monitor BMP  -encourage PO fluid intake after dysphagia screen, otherwise continue with gentle hydration -Cr 1.59 on admission, unknown baseline  -obtain collateral from PM[facility] Dr. Jannette Winters [phone] in am  -continue to monitor BMP, next BMP 10pm  -encourage PO fluid intake after dysphagia screen, otherwise continue with gentle hydration  -f/u urine lytes ADDENDUM: lethargy on ED arrival, appreciate neuro recs, no acute CVA, possible seizure , pt also w/ +UA, s/p keppra load, will c/w keppra, followup VEEG; start ceftriaxone , followup ctxs

## 2021-03-15 NOTE — H&P ADULT - PROBLEM SELECTOR PLAN 6
-WBC 16.63, however could be hemoconcentration, since pt is volume down  -no signs of infection currently, although with positive UA  -start Ceftriaxone 1g q24h and obtain UCx if pt becomes symptomatic -on home HCTZ 25mg and ramipril 20mg  -holding antihypertensives given pts dehydration and possible LYSSA  -restart as appropriate -Pt with positive UA: trace leuk esterase, WBC 5-10, bacteria present  -No complaints of dysuria, no signs of sepsis, afebrile  -WBC 16.63, not meeting sepsis criteria  -starting Ceftriaxone 1g q24h   -f/u UCx

## 2021-03-15 NOTE — ED ADULT NURSE NOTE - OBJECTIVE STATEMENT
Patient is an 88yo F, BIBA, residual left sided deficits from prior CVA, complaining or weakness.  Per [facility] who started her shift today at 8AM, patient was normal yesterday, had constipation, received laxatives and had diarrhea.  A noticed patient "did not seem like her normal self" this morning and was hypotensive.  PIV placed, labs drawn and sent, EKG done, placed on cardiac monitor.

## 2021-03-15 NOTE — H&P ADULT - PROBLEM SELECTOR PLAN 5
-Pt with positive UA: trace leuk esterase, WBC 5-10, bacteria present  -No complaints of dysuria, no signs of sepsis, afebrile  -WBC 16.63 but could be hemoconcentration given diarrhea  -No indication to start abx for UTI since pt is asymptomatic, however consider starting Ceftriaxone 1g q24h and obtain UCx if pt becomes symptomatic -Pt with positive UA: trace leuk esterase, WBC 5-10, bacteria present  -No complaints of dysuria, no signs of sepsis, afebrile  -WBC 16.63, not meeting sepsis criteria  -starting Ceftriaxone 1g q24h   -f/u UCx -H/H 9.7/29.3 (unknown baseline), normocytic anemia  -f/u iron studies -H/H 9.7/29.3 (unknown baseline), normocytic anemia  -f/u iron studies    ADDENDUM:     #elevated INR/ PTT: cause unclear, possible acquired factor deficiency; repeat Coags in AM, if remains elevated then check mixing studies

## 2021-03-15 NOTE — ED ADULT NURSE NOTE - NSIMPLEMENTINTERV_GEN_ALL_ED
Implemented All Fall with Harm Risk Interventions:  [place] to call system. Call bell, personal items and telephone within reach. Instruct patient to call for assistance. Room bathroom lighting operational. Non-slip footwear when patient is off stretcher. Physically safe environment: no spills, clutter or unnecessary equipment. Stretcher in lowest position, wheels locked, appropriate side rails in place. Provide visual cue, wrist band, yellow gown, etc. Monitor gait and stability. Monitor for mental status changes and reorient to person, place, and time. Review medications for side effects contributing to fall risk. Reinforce activity limits and safety measures with patient and family. Provide visual clues: red socks.

## 2021-03-16 VITALS
TEMPERATURE: 98 F | OXYGEN SATURATION: 99 % | HEART RATE: 72 BPM | SYSTOLIC BLOOD PRESSURE: 127 MMHG | RESPIRATION RATE: 20 BRPM | DIASTOLIC BLOOD PRESSURE: 70 MMHG

## 2021-03-16 LAB
A1C WITH ESTIMATED AVERAGE GLUCOSE RESULT: 5.1 % — SIGNIFICANT CHANGE UP (ref 4–5.6)
ALBUMIN SERPL ELPH-MCNC: 3.1 G/DL — LOW (ref 3.3–5)
ALP SERPL-CCNC: 81 U/L — SIGNIFICANT CHANGE UP (ref 40–120)
ALT FLD-CCNC: 10 U/L — SIGNIFICANT CHANGE UP (ref 10–45)
ANION GAP SERPL CALC-SCNC: 10 MMOL/L — SIGNIFICANT CHANGE UP (ref 5–17)
ANION GAP SERPL CALC-SCNC: 12 MMOL/L — SIGNIFICANT CHANGE UP (ref 5–17)
APTT BLD: 29.2 SEC — SIGNIFICANT CHANGE UP (ref 27.5–35.5)
AST SERPL-CCNC: 20 U/L — SIGNIFICANT CHANGE UP (ref 10–40)
BASOPHILS # BLD AUTO: 0.08 K/UL — SIGNIFICANT CHANGE UP (ref 0–0.2)
BASOPHILS NFR BLD AUTO: 0.8 % — SIGNIFICANT CHANGE UP (ref 0–2)
BILIRUB SERPL-MCNC: 0.4 MG/DL — SIGNIFICANT CHANGE UP (ref 0.2–1.2)
BUN SERPL-MCNC: 24 MG/DL — HIGH (ref 7–23)
BUN SERPL-MCNC: 27 MG/DL — HIGH (ref 7–23)
CALCIUM SERPL-MCNC: 9.1 MG/DL — SIGNIFICANT CHANGE UP (ref 8.4–10.5)
CALCIUM SERPL-MCNC: 9.4 MG/DL — SIGNIFICANT CHANGE UP (ref 8.4–10.5)
CHLORIDE SERPL-SCNC: 102 MMOL/L — SIGNIFICANT CHANGE UP (ref 96–108)
CHLORIDE SERPL-SCNC: 98 MMOL/L — SIGNIFICANT CHANGE UP (ref 96–108)
CO2 SERPL-SCNC: 22 MMOL/L — SIGNIFICANT CHANGE UP (ref 22–31)
CO2 SERPL-SCNC: 23 MMOL/L — SIGNIFICANT CHANGE UP (ref 22–31)
CREAT SERPL-MCNC: 1.24 MG/DL — SIGNIFICANT CHANGE UP (ref 0.5–1.3)
CREAT SERPL-MCNC: 1.48 MG/DL — HIGH (ref 0.5–1.3)
CULTURE RESULTS: NO GROWTH — SIGNIFICANT CHANGE UP
EOSINOPHIL # BLD AUTO: 0.1 K/UL — SIGNIFICANT CHANGE UP (ref 0–0.5)
EOSINOPHIL NFR BLD AUTO: 1 % — SIGNIFICANT CHANGE UP (ref 0–6)
ESTIMATED AVERAGE GLUCOSE: 100 MG/DL — SIGNIFICANT CHANGE UP (ref 68–114)
FERRITIN SERPL-MCNC: 140 NG/ML — SIGNIFICANT CHANGE UP (ref 15–150)
GLUCOSE SERPL-MCNC: 81 MG/DL — SIGNIFICANT CHANGE UP (ref 70–99)
GLUCOSE SERPL-MCNC: 88 MG/DL — SIGNIFICANT CHANGE UP (ref 70–99)
HCT VFR BLD CALC: 29.9 % — LOW (ref 34.5–45)
HGB BLD-MCNC: 9.8 G/DL — LOW (ref 11.5–15.5)
IMM GRANULOCYTES NFR BLD AUTO: 0.4 % — SIGNIFICANT CHANGE UP (ref 0–1.5)
INR BLD: 1.04 — SIGNIFICANT CHANGE UP (ref 0.88–1.16)
IRON SATN MFR SERPL: 16 % — SIGNIFICANT CHANGE UP (ref 14–50)
IRON SATN MFR SERPL: 29 UG/DL — LOW (ref 30–160)
LYMPHOCYTES # BLD AUTO: 1.79 K/UL — SIGNIFICANT CHANGE UP (ref 1–3.3)
LYMPHOCYTES # BLD AUTO: 17.1 % — SIGNIFICANT CHANGE UP (ref 13–44)
MAGNESIUM SERPL-MCNC: 1.9 MG/DL — SIGNIFICANT CHANGE UP (ref 1.6–2.6)
MCHC RBC-ENTMCNC: 28.7 PG — SIGNIFICANT CHANGE UP (ref 27–34)
MCHC RBC-ENTMCNC: 32.8 GM/DL — SIGNIFICANT CHANGE UP (ref 32–36)
MCV RBC AUTO: 87.7 FL — SIGNIFICANT CHANGE UP (ref 80–100)
MONOCYTES # BLD AUTO: 0.99 K/UL — HIGH (ref 0–0.9)
MONOCYTES NFR BLD AUTO: 9.4 % — SIGNIFICANT CHANGE UP (ref 2–14)
NEUTROPHILS # BLD AUTO: 7.48 K/UL — HIGH (ref 1.8–7.4)
NEUTROPHILS NFR BLD AUTO: 71.3 % — SIGNIFICANT CHANGE UP (ref 43–77)
NRBC # BLD: 0 /100 WBCS — SIGNIFICANT CHANGE UP (ref 0–0)
PHOSPHATE SERPL-MCNC: 3.1 MG/DL — SIGNIFICANT CHANGE UP (ref 2.5–4.5)
PLATELET # BLD AUTO: 257 K/UL — SIGNIFICANT CHANGE UP (ref 150–400)
POTASSIUM SERPL-MCNC: 3.6 MMOL/L — SIGNIFICANT CHANGE UP (ref 3.5–5.3)
POTASSIUM SERPL-MCNC: 3.7 MMOL/L — SIGNIFICANT CHANGE UP (ref 3.5–5.3)
POTASSIUM SERPL-SCNC: 3.6 MMOL/L — SIGNIFICANT CHANGE UP (ref 3.5–5.3)
POTASSIUM SERPL-SCNC: 3.7 MMOL/L — SIGNIFICANT CHANGE UP (ref 3.5–5.3)
PROT SERPL-MCNC: 5.8 G/DL — LOW (ref 6–8.3)
PROTHROM AB SERPL-ACNC: 12.5 SEC — SIGNIFICANT CHANGE UP (ref 10.6–13.6)
RBC # BLD: 3.41 M/UL — LOW (ref 3.8–5.2)
RBC # FLD: 14.3 % — SIGNIFICANT CHANGE UP (ref 10.3–14.5)
SODIUM SERPL-SCNC: 133 MMOL/L — LOW (ref 135–145)
SODIUM SERPL-SCNC: 134 MMOL/L — LOW (ref 135–145)
SPECIMEN SOURCE: SIGNIFICANT CHANGE UP
TIBC SERPL-MCNC: 184 UG/DL — LOW (ref 220–430)
TSH SERPL-MCNC: 1.6 UIU/ML — SIGNIFICANT CHANGE UP (ref 0.35–4.94)
UIBC SERPL-MCNC: 155 UG/DL — SIGNIFICANT CHANGE UP (ref 110–370)
WBC # BLD: 10.48 K/UL — SIGNIFICANT CHANGE UP (ref 3.8–10.5)
WBC # FLD AUTO: 10.48 K/UL — SIGNIFICANT CHANGE UP (ref 3.8–10.5)

## 2021-03-16 PROCEDURE — 87040 BLOOD CULTURE FOR BACTERIA: CPT

## 2021-03-16 PROCEDURE — 84484 ASSAY OF TROPONIN QUANT: CPT

## 2021-03-16 PROCEDURE — 84295 ASSAY OF SERUM SODIUM: CPT

## 2021-03-16 PROCEDURE — 81001 URINALYSIS AUTO W/SCOPE: CPT

## 2021-03-16 PROCEDURE — 83735 ASSAY OF MAGNESIUM: CPT

## 2021-03-16 PROCEDURE — 82728 ASSAY OF FERRITIN: CPT

## 2021-03-16 PROCEDURE — 71045 X-RAY EXAM CHEST 1 VIEW: CPT

## 2021-03-16 PROCEDURE — 99222 1ST HOSP IP/OBS MODERATE 55: CPT

## 2021-03-16 PROCEDURE — 83605 ASSAY OF LACTIC ACID: CPT

## 2021-03-16 PROCEDURE — 70450 CT HEAD/BRAIN W/O DYE: CPT

## 2021-03-16 PROCEDURE — 82962 GLUCOSE BLOOD TEST: CPT

## 2021-03-16 PROCEDURE — 83540 ASSAY OF IRON: CPT

## 2021-03-16 PROCEDURE — 82330 ASSAY OF CALCIUM: CPT

## 2021-03-16 PROCEDURE — 85610 PROTHROMBIN TIME: CPT

## 2021-03-16 PROCEDURE — 87086 URINE CULTURE/COLONY COUNT: CPT

## 2021-03-16 PROCEDURE — 84132 ASSAY OF SERUM POTASSIUM: CPT

## 2021-03-16 PROCEDURE — 82746 ASSAY OF FOLIC ACID SERUM: CPT

## 2021-03-16 PROCEDURE — 80053 COMPREHEN METABOLIC PANEL: CPT

## 2021-03-16 PROCEDURE — 92610 EVALUATE SWALLOWING FUNCTION: CPT

## 2021-03-16 PROCEDURE — 83880 ASSAY OF NATRIURETIC PEPTIDE: CPT

## 2021-03-16 PROCEDURE — 83036 HEMOGLOBIN GLYCOSYLATED A1C: CPT

## 2021-03-16 PROCEDURE — 84443 ASSAY THYROID STIM HORMONE: CPT

## 2021-03-16 PROCEDURE — 85730 THROMBOPLASTIN TIME PARTIAL: CPT

## 2021-03-16 PROCEDURE — 85025 COMPLETE CBC W/AUTO DIFF WBC: CPT

## 2021-03-16 PROCEDURE — 99285 EMERGENCY DEPT VISIT HI MDM: CPT | Mod: XU

## 2021-03-16 PROCEDURE — 82803 BLOOD GASES ANY COMBINATION: CPT

## 2021-03-16 PROCEDURE — 83550 IRON BINDING TEST: CPT

## 2021-03-16 PROCEDURE — 82607 VITAMIN B-12: CPT

## 2021-03-16 PROCEDURE — 84100 ASSAY OF PHOSPHORUS: CPT

## 2021-03-16 PROCEDURE — U0005: CPT

## 2021-03-16 PROCEDURE — 80048 BASIC METABOLIC PNL TOTAL CA: CPT

## 2021-03-16 PROCEDURE — 36415 COLL VENOUS BLD VENIPUNCTURE: CPT

## 2021-03-16 PROCEDURE — U0003: CPT

## 2021-03-16 PROCEDURE — 99233 SBSQ HOSP IP/OBS HIGH 50: CPT | Mod: GC

## 2021-03-16 RX ORDER — HYDROCHLOROTHIAZIDE 25 MG
0 TABLET ORAL
Qty: 0 | Refills: 0 | DISCHARGE

## 2021-03-16 RX ORDER — SODIUM CHLORIDE 9 MG/ML
1000 INJECTION INTRAMUSCULAR; INTRAVENOUS; SUBCUTANEOUS
Refills: 0 | Status: DISCONTINUED | OUTPATIENT
Start: 2021-03-16 | End: 2021-03-16

## 2021-03-16 RX ORDER — CEFPODOXIME PROXETIL 100 MG
1 TABLET ORAL
Qty: 4 | Refills: 0
Start: 2021-03-16 | End: 2021-03-17

## 2021-03-16 RX ORDER — LEVETIRACETAM 250 MG/1
250 TABLET, FILM COATED ORAL EVERY 12 HOURS
Refills: 0 | Status: DISCONTINUED | OUTPATIENT
Start: 2021-03-16 | End: 2021-03-16

## 2021-03-16 RX ADMIN — HEPARIN SODIUM 5000 UNIT(S): 5000 INJECTION INTRAVENOUS; SUBCUTANEOUS at 13:12

## 2021-03-16 RX ADMIN — BUPROPION HYDROCHLORIDE 150 MILLIGRAM(S): 150 TABLET, EXTENDED RELEASE ORAL at 11:21

## 2021-03-16 RX ADMIN — HEPARIN SODIUM 5000 UNIT(S): 5000 INJECTION INTRAVENOUS; SUBCUTANEOUS at 06:29

## 2021-03-16 RX ADMIN — FAMOTIDINE 20 MILLIGRAM(S): 10 INJECTION INTRAVENOUS at 11:21

## 2021-03-16 RX ADMIN — CLOPIDOGREL BISULFATE 75 MILLIGRAM(S): 75 TABLET, FILM COATED ORAL at 11:21

## 2021-03-16 RX ADMIN — SODIUM CHLORIDE 70 MILLILITER(S): 9 INJECTION INTRAMUSCULAR; INTRAVENOUS; SUBCUTANEOUS at 01:30

## 2021-03-16 RX ADMIN — LEVETIRACETAM 250 MILLIGRAM(S): 250 TABLET, FILM COATED ORAL at 00:13

## 2021-03-16 NOTE — CONSULT NOTE ADULT - SUBJECTIVE AND OBJECTIVE BOX
EPILEPSY CONSULT NOTE:  HPI:       Epilepsy risk factors:  Head injury with subsequent LOC?:  Febrile seizures in infancy?:  Hx of CNS infection?:  Family hx of epilepsy?:  Known CNS pathology?:     Prior AEDs      Prior imaging     Prior EEGs     Other diagnostic work-up     Review of Systems:  CONSTITUTIONAL:  No weight loss, fever, chills, weakness or fatigue.  HEENT:  Eyes:  No visual loss, blurred vision, double vision or yellow sclerae. Ears, Nose, Throat:  No hearing loss, sneezing, congestion, runny nose or sore throat.  SKIN:  No rash or itching.  CARDIOVASCULAR:  No chest pain, chest pressure or chest discomfort. No palpitations or edema.  RESPIRATORY:  No shortness of breath, cough or sputum.  GASTROINTESTINAL:  No anorexia, nausea, vomiting or diarrhea. No abdominal pain or blood.  GENITOURINARY: No Burning on urination.   NEUROLOGICAL:  see HPI  MUSCULOSKELETAL:  No muscle, back pain, joint pain or stiffness.  HEMATOLOGIC:  No anemia, bleeding or bruising.  LYMPHATICS:  No enlarged nodes. No history of splenectomy.  PSYCHIATRIC:  No history of depression or anxiety.  ENDOCRINOLOGIC:  No reports of sweating, cold or heat intolerance. No polyuria or polydipsia.  ALLERGIES:  No history of asthma, hives, eczema or rhinitis.       PAST MEDICAL & SURGICAL HISTORY:  HTN (hypertension)    HLD (hyperlipidemia)    Cerebrovascular accident (CVA)    Diabetes    No significant past surgical history         FAMILY HISTORY:  No pertinent family history in first degree relatives         Social History:  Alcohol, illicits, smoking?:  Driving?:  Occupation:     Allergies  morphine (Other)       MEDICATIONS  (STANDING):  atorvastatin 40 milliGRAM(s) Oral at bedtime  buPROPion XL . 150 milliGRAM(s) Oral daily  cefTRIAXone   IVPB 1000 milliGRAM(s) IV Intermittent every 24 hours  clopidogrel Tablet 75 milliGRAM(s) Oral daily  famotidine    Tablet 20 milliGRAM(s) Oral daily  heparin   Injectable 5000 Unit(s) SubCutaneous every 8 hours    MEDICATIONS  (PRN):       T(C): 36.6 (03-16-21 @ 13:06), Max: 36.8 (03-16-21 @ 05:39)  HR: 72 (03-16-21 @ 13:06) (63 - 80)  BP: 127/70 (03-16-21 @ 13:06) (98/58 - 142/75)  RR: 20 (03-16-21 @ 13:06) (17 - 20)  SpO2: 99% (03-16-21 @ 13:06) (95% - 100%)  Wt(kg): --    General:  Constitutional:  Sitting comfortably in NAD.  Psychiatric: calm, normal affect, no overt anxiety or internal preoccupation  Ears, Nose, Throat: no abnormalities, mucus membranes moist  Neck: supple, no lymphadenopathy or nodules palpable  Cardiovascular: regular rate and rhythm, normal S1/S2, no murmurs   Chest: Clear to bases. 	  Abdomen: soft, non-tender, no hepatosplenomegaly   Extremities: no edema, clubbing or cyanosis  Skin: no rash or neurocutaneous signs     Cognitive:  Orientation, language, memory and knowledge screens intact.  Registration: 	4/4		Serial 7’s: normal		Recall 4/4    Cranial Nerves:  II: Full to confrontation. III/IV/VI: PERRL EOMF No nystagmus  V1V2V3: Symmetric, VII: Face appears symmetric VIII: Normal to screening, IX/X: Palate Elevates Symmetrical  XI: Trapezius Symmetric  XII: Tongue midline  Motor:  Power: 5/5 throughout, tone: normal x 4 limbs, no tremor   Sensation:  Intact to light touch. Intact to pinprick/temperature and vibration.  Coordination/Gait:  Finger-nose-finger intact, normal rapid-alternating movements.  Fine motor normal with normal rapid finger taps and heel-toe tapping   narrow based gait, tandem forward and back ok  hops well on both feet, heel and toe walking normal   Reflexes:  DTR: 2+ symmetric all 4 limbs, no clonus  Plantar responses: Down bilaterally         Labs  CBC Full  -  ( 16 Mar 2021 07:52 )  WBC Count : 10.48 K/uL  RBC Count : 3.41 M/uL  Hemoglobin : 9.8 g/dL  Hematocrit : 29.9 %  Platelet Count - Automated : 257 K/uL  Mean Cell Volume : 87.7 fl  Mean Cell Hemoglobin : 28.7 pg  Mean Cell Hemoglobin Concentration : 32.8 gm/dL  Auto Neutrophil # : 7.48 K/uL  Auto Lymphocyte # : 1.79 K/uL  Auto Monocyte # : 0.99 K/uL  Auto Eosinophil # : 0.10 K/uL  Auto Basophil # : 0.08 K/uL  Auto Neutrophil % : 71.3 %  Auto Lymphocyte % : 17.1 %  Auto Monocyte % : 9.4 %  Auto Eosinophil % : 1.0 %  Auto Basophil % : 0.8 %    03-16    134<L>  |  102  |  24<H>  ----------------------------<  81  3.7   |  22  |  1.24    Ca    9.4      16 Mar 2021 07:52  Phos  3.1     03-16  Mg     1.9     03-16    TPro  5.8<L>  /  Alb  3.1<L>  /  TBili  0.4  /  DBili  x   /  AST  20  /  ALT  10  /  AlkPhos  81  03-16    LIVER FUNCTIONS - ( 16 Mar 2021 07:52 )  Alb: 3.1 g/dL / Pro: 5.8 g/dL / ALK PHOS: 81 U/L / ALT: 10 U/L / AST: 20 U/L / GGT: x           PT/INR - ( 16 Mar 2021 10:59 )   PT: 12.5 sec;   INR: 1.04          PTT - ( 16 Mar 2021 10:59 )  PTT:29.2 sec      EEG: EPILEPSY CONSULT NOTE:  HPI:  History obtained from chart and HHA at bedside.  87F with PMHx of DM, HLD, CVA w residual L sided weakness BIBA from home with lethargy and weakness as noted by 24hr HHA. Per the aide, she had been constipated on Sunday, given miralax and an enema and ended up with profuse diarrhea overnight (>5 episodes, watery nonbloody), found to be hypotensive with SBP in the 90s, difficult to rouse this morning. Per HHA, pt normally has at least 1BM per week but requires weekly enemas. No recent sick contacts, no recent travel, no recent antibiotic use, no fevers, no URI symptoms, no CP, no SOB, no abd pain, no dysuria. Patient is bedbound, homebound with 24/7 HHA.     ED Vitals: Tmax 98.2F HR 50-60s -120/40-50 RR 16 SpO2 100% on 2L NC  ED Labs: WBC 16.63 with neutrophilic predominance, H/H 9.7/29.3, BUN/Cr 26/1.59, UA: spec grav 1.030, trace leuk esterase WBC 5-10 Bacteria present  CTH: Remote bilateral MCA distribution chronic areas of infarction without apparent site of acute cortical infarct. A lacunar infarct in the left thalamocapsular region is of indeterminate age.  CXR: Heart size within normal limits, thoracic aortic calcification. Left basilar focal atelectasis, elevation of the left hemidiaphragm. Right perihilar focal atelectasis. Thoracic spine and bilateral shoulder degenerative changes, levoscoliosis. Right [place] shoulder.  Interventions: 1L NS, Stroke code was called in the ED, NIHSS 18, Pt loaded with 750mg Keppra for presumed seizures.    Epilepsy consulted for suspected seizures given the acute unresponsiveness. Patient has no known history of seizures, no identifiable provoking agents or substance. However, patient does have an identifiable risk, history of stroke (chronic infarcts in the bilateral MCA regions).      Epilepsy risk factors:  Head injury with subsequent LOC?: Unknown  Febrile seizures in infancy?: Unknown  Hx of CNS infection?: Unknown  Family hx of epilepsy?: Unknown  Known CNS pathology?: Unknown     Review of Systems:  Unobtainable 2/2 AMS (baseline)     PAST MEDICAL & SURGICAL HISTORY:  HTN (hypertension)  HLD (hyperlipidemia)  Cerebrovascular accident (CVA)  Diabetes    FAMILY HISTORY:  No pertinent family history in first degree relatives     Allergies  morphine (Other)    MEDICATIONS  (STANDING):  atorvastatin 40 milliGRAM(s) Oral at bedtime  buPROPion XL . 150 milliGRAM(s) Oral daily  cefTRIAXone   IVPB 1000 milliGRAM(s) IV Intermittent every 24 hours  clopidogrel Tablet 75 milliGRAM(s) Oral daily  famotidine    Tablet 20 milliGRAM(s) Oral daily  heparin   Injectable 5000 Unit(s) SubCutaneous every 8 hours    VITAL SIGNS:  T(C): 36.6 (03-16-21 @ 13:06), Max: 36.8 (03-16-21 @ 05:39)  HR: 72 (03-16-21 @ 13:06) (63 - 80)  BP: 127/70 (03-16-21 @ 13:06) (98/58 - 142/75)  RR: 20 (03-16-21 @ 13:06) (17 - 20)  SpO2: 99% (03-16-21 @ 13:06) (95% - 100%)  Wt(kg): --    PHYSICAL EXAM:  Constitutional: Lying in bed in no acute distress  Psychiatric: pleasant  Ears, Nose, Throat: no abnormalities, mucus membranes moist  Neck: supple, no lymphadenopathy or nodules palpable  Cardiovascular: regular rate and rhythm, normal S1/S2, no murmurs   Chest: Clear to bases. 	  Abdomen: soft, non-tender, no hepatosplenomegaly   Extremities: no edema, clubbing or cyanosis  Skin: no rash or neurocutaneous signs     Cognitive:  Sleeping, but arousable. Oriented to name, but disoriented to date and place. Occasionally responds w/ nonsensical words. No dysarthria or aphasia. Follow some simple commands.     Cranial Nerves:  PERRLA 2mm brisk in L eye, R eye w/ cataract removed. Does not follow for EOM, Track bedside activity. Face appear symmetric at rest.   Motor:  Patella right 2+. Patella left 2+.:d RUE and RLE 5/5, LUE contracted and 3/5, and LLE 4/5  Reflexes:  DTR: 2+ symmetric all 4 limbs    LABS:  CBC Full  -  ( 16 Mar 2021 07:52 )  WBC Count : 10.48 K/uL  RBC Count : 3.41 M/uL  Hemoglobin : 9.8 g/dL  Hematocrit : 29.9 %  Platelet Count - Automated : 257 K/uL  Mean Cell Volume : 87.7 fl  Mean Cell Hemoglobin : 28.7 pg  Mean Cell Hemoglobin Concentration : 32.8 gm/dL  Auto Neutrophil # : 7.48 K/uL  Auto Lymphocyte # : 1.79 K/uL  Auto Monocyte # : 0.99 K/uL  Auto Eosinophil # : 0.10 K/uL  Auto Basophil # : 0.08 K/uL  Auto Neutrophil % : 71.3 %  Auto Lymphocyte % : 17.1 %  Auto Monocyte % : 9.4 %  Auto Eosinophil % : 1.0 %  Auto Basophil % : 0.8 %    03-16    134<L>  |  102  |  24<H>  ----------------------------<  81  3.7   |  22  |  1.24    Ca    9.4      16 Mar 2021 07:52  Phos  3.1     03-16  Mg     1.9     03-16    TPro  5.8<L>  /  Alb  3.1<L>  /  TBili  0.4  /  DBili  x   /  AST  20  /  ALT  10  /  AlkPhos  81  03-16    LIVER FUNCTIONS - ( 16 Mar 2021 07:52 )  Alb: 3.1 g/dL / Pro: 5.8 g/dL / ALK PHOS: 81 U/L / ALT: 10 U/L / AST: 20 U/L / GGT: x           PT/INR - ( 16 Mar 2021 10:59 )   PT: 12.5 sec;   INR: 1.04     PTT - ( 16 Mar 2021 10:59 )  PTT:29.2 sec

## 2021-03-16 NOTE — PROGRESS NOTE ADULT - PROBLEM SELECTOR PLAN 7
-on home HCTZ 25mg and ramipril 20mg  -holding antihypertensives given pts dehydration and possible LYSSA  -restart as appropriate

## 2021-03-16 NOTE — SWALLOW BEDSIDE ASSESSMENT ADULT - COMMENTS
Pt received asleep but arousable; mumbling, not able to express needs/wants, not following simple commands

## 2021-03-16 NOTE — PROGRESS NOTE ADULT - PROBLEM SELECTOR PLAN 2
Pt with multiple episodes of diarrhea s/p enema and miralax, after being constipated for 1 week. With marked improvement s/p fluid resuscitation  -encourage PO intake after dysphagia screen  -can continue with IV maintenance fluids  -monitor for recurrence  -check stool studies if persisting  -check abdominal imaging if worsening signs/sxs

## 2021-03-16 NOTE — DISCHARGE NOTE PROVIDER - NSDCCPCAREPLAN_GEN_ALL_CORE_FT
PRINCIPAL DISCHARGE DIAGNOSIS  Diagnosis: Dehydration  Assessment and Plan of Treatment: You came to the hospital with very low blood pressures and some altered mental status that is concerning for dehydration. This was likely because you had alot of stool softners and enemas that caused profuse diarrhea. Dehydration can cause low blood pressure which can make people less alert and oriented. In the hospital we gave you fluids through IV and your blood pressure and mental status resolved. You are now safe to go with your 24 hour health aids to assist at home.      SECONDARY DISCHARGE DIAGNOSES  Diagnosis: Acute UTI  Assessment and Plan of Treatment: You came to the hospital dehydrated and with poor mental status. We took a number of blood and urine studies on you kristi showed your have a urinary tract infection. For this we have placed you on IV antibiotics while in the hospital and will transition to oral antibiotics on discharge. Please continue to take these for a total of 3 day course.    Diagnosis: LYSSA (acute kidney injury)  Assessment and Plan of Treatment: Due to the dehydration you had outside the hospital, your kidney function was reduced, which is also very common. The fluids we gave you in the hospital helped your kidney function and it is now improving. Please try to stay hydrated and drink water while you are home.

## 2021-03-16 NOTE — DISCHARGE NOTE PROVIDER - NSDCMRMEDTOKEN_GEN_ALL_CORE_FT
ATORVASTATIN CALCIUM  40 MG TABS:   BUPROPION HYDROCHLORIDE ER (XL)  150 MG TB24:   CLOPIDOGREL  75 MG TABS:   FAMOTIDINE  20 MG TABS:   GABAPENTIN  100 MG CAPS:   HYDROCHLOROTHIAZIDE  25 MG TABS:   Ocuvite oral tablet: 1 tab(s) orally once a day  RAMIPRIL  10 MG CAPS:   SENNA: GIVE 1 TABLET BY MOUTH EVERY 12 HOURS AS NEEDED FOR CONSTIPATION  Vitamin D3 400 intl units (10 mcg) oral tablet:    ATORVASTATIN CALCIUM  40 MG TABS:   BUPROPION HYDROCHLORIDE ER (XL)  150 MG TB24:   cefpodoxime 200 mg oral tablet: 1 tab(s) orally 2 times a day   CLOPIDOGREL  75 MG TABS:   FAMOTIDINE  20 MG TABS:   GABAPENTIN  100 MG CAPS:   Ocuvite oral tablet: 1 tab(s) orally once a day  RAMIPRIL  10 MG CAPS:   SENNA: GIVE 1 TABLET BY MOUTH EVERY 12 HOURS AS NEEDED FOR CONSTIPATION  Vitamin D3 400 intl units (10 mcg) oral tablet:

## 2021-03-16 NOTE — DISCHARGE NOTE PROVIDER - CARE PROVIDER_API CALL
[facility]  [id number]  N  INOCENCIA BURNS, Phys,    Phone: ([phone]  Fax: ([phone]  Follow Up Time: 2 weeks   Aleyda Vogel  INTERNAL MEDICINE  [address] [place] [address], [address]  [facility]  Phone: [phone]  Fax: (   )    -  Scheduled Appointment: 03/22/2021 01:30 PM

## 2021-03-16 NOTE — PROGRESS NOTE ADULT - PROBLEM SELECTOR PLAN 4
-Cr 1.59 on admission, unknown baseline; Cr WNL 1.24 at 07:52am  -obtain collateral from PMD Dr. Jannette Winters [phone]  -continue to monitor BMP  -encourage PO intake after dysphagia screen, otherwise continue with gentle hydration  -hyponatremia: monitor BMP s/p NS boluses to avoid sodium overcorrection; continue maintenance IVFs pending repeat sodium levels

## 2021-03-16 NOTE — PROGRESS NOTE ADULT - PROBLEM SELECTOR PLAN 8
-Pt with history of prior CVA with residual L sided deficits  -c/w ASA 81mg, Plavix 75mg, Atorvastatin 40mg  -Seen by Stroke Neurology on this admission, NIHSS 18    #MDD  -c/w bupropion 150mg    Dementia  -possibly vascular s/p CVA; currently at baseline, check b12/ folate/ TSH

## 2021-03-16 NOTE — PROGRESS NOTE ADULT - PROBLEM SELECTOR PLAN 5
-H/H 9.7/29.3 (unknown baseline), normocytic anemia; Stable H/H at 9.8/29.9  -Iron studies at 07:52am: Serum Fe borderline low at 29; TIBC low at 184  -Continue to monitor H/H     elevated INR/ PTT: Coags this AM WNL  Resolved

## 2021-03-16 NOTE — PROGRESS NOTE ADULT - ASSESSMENT
87F with PMHx of DM, HLD, CVA w residual L sided weakness BIBA from home with lethargy and weakness after multiple episodes of diarrhea, admitted to [facility] for fluid resuscitation and further management.

## 2021-03-16 NOTE — DISCHARGE NOTE NURSING/CASE MANAGEMENT/SOCIAL WORK - NSDCPEPT PROEDMA_GEN_ALL_CORE
B/p 40/14/23. Placed patient in trendelenburg and Natalya charge nurse calling patients son's and not receiving any answer only voice mail.  Message left to call asap Yes

## 2021-03-16 NOTE — DISCHARGE NOTE NURSING/CASE MANAGEMENT/SOCIAL WORK - PATIENT PORTAL LINK FT
You can access the FollowMyHealth Patient Portal offered by [facility] by registering at the following website: [email or web address]. By joining [facility]s FollowMyHealth portal, you will also be able to view your health information using other applications (apps) compatible with our system.

## 2021-03-16 NOTE — DISCHARGE NOTE PROVIDER - NSDCFUADDAPPT_GEN_ALL_CORE_FT
PMD Dr. Jannette Winters [phone] Please follow up with PMD Dr. Jannette Winters [phone] on 4/26 at 1:30PM at:  [facility]   [facility] [address] [employer], [place].  Please follow up with your Primary Care Provider, Dr. Aleyda Vogel on behalf of Dr. Jannette Winters at [address] [place] [address], [address] Floor, [place] [address] on 03/22/2021 at 1:30pm.    Please bring your Insurance card, Photo ID and Discharge paperwork to your appointment.    Appointment was scheduled by Ms. TANO Nichols, Referral Coordinator.

## 2021-03-16 NOTE — PROGRESS NOTE ADULT - SUBJECTIVE AND OBJECTIVE BOX
ADRIANNE OMALLEY, 87y, Female  MRN-[id number]  Patient is a 87y old  Female who presents with a chief complaint of AMS, weakness (15 Mar 2021 19:29)      OVERNIGHT EVENTS:     SUBJECTIVE:    12 Point ROS Negative unless noted otherwise above.  -------------------------------------------------------------------------------  VITAL SIGNS:  Vital Signs Last 24 Hrs  T(C): 36.8 (16 Mar 2021 05:39), Max: 36.8 (15 Mar 2021 12:45)  T(F): 98.2 (16 Mar 2021 05:39), Max: 98.2 (15 Mar 2021 12:45)  HR: 69 (16 Mar 2021 05:39) (57 - 80)  BP: 142/75 (16 Mar 2021 05:39) (98/58 - 142/75)  BP(mean): --  RR: 18 (16 Mar 2021 05:39) (16 - 18)  SpO2: 95% (16 Mar 2021 05:39) (95% - 100%)  I&O's Summary      PHYSICAL EXAM:    General: NAD, well developed  HEENT: NC/AT; EOMI, PERRLA, anicteric sclera; moist mucosal membranes.  Neck: supple, trachea midline  Cardiovascular: RRR, +S1/S2; NO M/R/G  Respiratory: CTA B/L; no W/R/R  Gastrointestinal: soft, NT/ND; +BSx4  Extremities: WWP; no edema or cyanosis  Vascular: 2+ radial, DP/PT pulses B/L  Neurological: AAOx3; no focal deficits    ALLERGIES:  Allergies    morphine (Other)    Intolerances        MEDICATIONS:  MEDICATIONS  (STANDING):  atorvastatin 40 milliGRAM(s) Oral at bedtime  buPROPion XL . 150 milliGRAM(s) Oral daily  cefTRIAXone   IVPB 1000 milliGRAM(s) IV Intermittent every 24 hours  clopidogrel Tablet 75 milliGRAM(s) Oral daily  famotidine    Tablet 20 milliGRAM(s) Oral daily  heparin   Injectable 5000 Unit(s) SubCutaneous every 8 hours    MEDICATIONS  (PRN):      -------------------------------------------------------------------------------  LABS:                        9.8    10.48 )-----------( 257      ( 16 Mar 2021 07:52 )             29.9     03-16    134<L>  |  102  |  24<H>  ----------------------------<  81  3.7   |  22  |  1.24    Ca    9.4      16 Mar 2021 07:52  Phos  3.1     03-16  Mg     1.9     03-16    TPro  5.8<L>  /  Alb  3.1<L>  /  TBili  0.4  /  DBili  x   /  AST  20  /  ALT  10  /  AlkPhos  81  03-16    LIVER FUNCTIONS - ( 16 Mar 2021 07:52 )  Alb: 3.1 g/dL / Pro: 5.8 g/dL / ALK PHOS: 81 U/L / ALT: 10 U/L / AST: 20 U/L / GGT: x           PT/INR - ( 15 Mar 2021 13:32 )   PT: 27.1 sec;   INR: 2.35          PTT - ( 15 Mar 2021 13:32 )  PTT:57.1 sec  Urinalysis Basic - ( 15 Mar 2021 15:35 )    Color: Brown / Appearance: Clear / SG: >=1.030 / pH: x  Gluc: x / Ketone: Trace mg/dL  / Bili: Small / Urobili: 1.0 E.U./dL   Blood: x / Protein: Trace mg/dL / Nitrite: NEGATIVE   Leuk Esterase: Trace / RBC: < 5 /HPF / WBC 5-10 /HPF   Sq Epi: x / Non Sq Epi: 0-5 /HPF / Bacteria: Present /HPF      CAPILLARY BLOOD GLUCOSE  118 (15 Mar 2021 19:26)      POCT Blood Glucose.: 118 mg/dL (15 Mar 2021 12:06)      Culture - Blood (collected 15 Mar 2021 16:43)  Source: .Blood Blood  Preliminary Report (16 Mar 2021 05:05):    No growth at 12 hours    Culture - Blood (collected 15 Mar 2021 16:43)  Source: .Blood Blood  Preliminary Report (16 Mar 2021 05:05):    No growth at 12 hours      COVID-19 PCR: NotDetec (15 Mar 2021 13:57)      RADIOLOGY & ADDITIONAL TESTS: Reviewed.       ADRIANNE OMALLEY, 87y, Female  MRN-[id number]  Patient is a 87y old  Female w/ pmh DM, HLD, HTN, and CVA 3 months ago who presents with a chief complaint of AMS, weakness (15 Mar 2021 19:29). Mental status improved after receiving IV NS fluids. Aid who called the ambulance is in room with the patient, and states the patient was unresponsive. Aid states that patient is now at baseline mental status.       OVERNIGHT EVENTS:   Frequency of bowel movements have decreased, only 2 bowel movements overnight according to aid. Quality of stool was liquidy and brown. No blood in stool.    SUBJECTIVE:    12 Point ROS Negative unless noted otherwise above.  -------------------------------------------------------------------------------  VITAL SIGNS:  Vital Signs Last 24 Hrs  T(C): 36.8 (16 Mar 2021 05:39), Max: 36.8 (15 Mar 2021 12:45)  T(F): 98.2 (16 Mar 2021 05:39), Max: 98.2 (15 Mar 2021 12:45)  HR: 69 (16 Mar 2021 05:39) (57 - 80)  BP: 142/75 (16 Mar 2021 05:39) (98/58 - 142/75)  BP(mean): --  RR: 18 (16 Mar 2021 05:39) (16 - 18)  SpO2: 95% (16 Mar 2021 05:39) (95% - 100%)  I&O's Summary      PHYSICAL EXAM:    General: NAD, well developed  HEENT: NC/AT; EOMI, PERRLA, anicteric sclera; moist mucosal membranes.  Neck: supple, trachea midline  Cardiovascular: RRR, +S1/S2; NO M/R/G  Respiratory: CTA B/L; no W/R/R  Gastrointestinal: soft, NT/ND; +BSx4  Extremities:   Vascular: 2+ radial, DP/PT pulses B/L  Neurological: AAOx3; no focal deficits    ALLERGIES:  Allergies    morphine (Other)    Intolerances        MEDICATIONS:  MEDICATIONS  (STANDING):  atorvastatin 40 milliGRAM(s) Oral at bedtime  buPROPion XL . 150 milliGRAM(s) Oral daily  cefTRIAXone   IVPB 1000 milliGRAM(s) IV Intermittent every 24 hours  clopidogrel Tablet 75 milliGRAM(s) Oral daily  famotidine    Tablet 20 milliGRAM(s) Oral daily  heparin   Injectable 5000 Unit(s) SubCutaneous every 8 hours    MEDICATIONS  (PRN):      -------------------------------------------------------------------------------  LABS:                        9.8    10.48 )-----------( 257      ( 16 Mar 2021 07:52 )             29.9     03-16    134<L>  |  102  |  24<H>  ----------------------------<  81  3.7   |  22  |  1.24    Ca    9.4      16 Mar 2021 07:52  Phos  3.1     03-16  Mg     1.9     03-16    TPro  5.8<L>  /  Alb  3.1<L>  /  TBili  0.4  /  DBili  x   /  AST  20  /  ALT  10  /  AlkPhos  81  03-16    LIVER FUNCTIONS - ( 16 Mar 2021 07:52 )  Alb: 3.1 g/dL / Pro: 5.8 g/dL / ALK PHOS: 81 U/L / ALT: 10 U/L / AST: 20 U/L / GGT: x           PT/INR - ( 15 Mar 2021 13:32 )   PT: 27.1 sec;   INR: 2.35          PTT - ( 15 Mar 2021 13:32 )  PTT:57.1 sec  Urinalysis Basic - ( 15 Mar 2021 15:35 )    Color: Brown / Appearance: Clear / SG: >=1.030 / pH: x  Gluc: x / Ketone: Trace mg/dL  / Bili: Small / Urobili: 1.0 E.U./dL   Blood: x / Protein: Trace mg/dL / Nitrite: NEGATIVE   Leuk Esterase: Trace / RBC: < 5 /HPF / WBC 5-10 /HPF   Sq Epi: x / Non Sq Epi: 0-5 /HPF / Bacteria: Present /HPF      CAPILLARY BLOOD GLUCOSE  118 (15 Mar 2021 19:26)      POCT Blood Glucose.: 118 mg/dL (15 Mar 2021 12:06)      Culture - Blood (collected 15 Mar 2021 16:43)  Source: .Blood Blood  Preliminary Report (16 Mar 2021 05:05):    No growth at 12 hours    Culture - Blood (collected 15 Mar 2021 16:43)  Source: .Blood Blood  Preliminary Report (16 Mar 2021 05:05):    No growth at 12 hours      COVID-19 PCR: NotDetec (15 Mar 2021 13:57)      RADIOLOGY & ADDITIONAL TESTS: Reviewed.       ADRIANNE OMALLEY, 87y, Female  MRN-[id number]  Patient is a 87y old Female w/ pmh DM, HLD, HTN, and CVA 3 months ago who presents with a chief complaint of AMS, weakness (15 Mar 2021 19:29). Per aid at patient's bedside, patient was found unresponsive and hypotensive yesterday morning (3/15). Patient had been given miralax and an enema for constipation by another aid Sunday night (3/14). Following miralax and enema, patient experienced profuse diarrhea throughout the night. Patient arrived to the ED via ambulance, and a stroke code was called in the ED after provider noted right sided weakness and right-sided gaze preference. Acute CVA was r/o after clinical assessment by neurology team showed right gaze preference could be overcome, and no acute infarct or hemorrhage was seen on head CT. Patient's mental status improved after receiving IV NS fluids. Per aid, frequency of bowel movements have decreased, only 2 bowel movements overnight according to aid. Quality of stool was liquidy and brown and no blood in stool. Patient is now alert, but not oriented, which is baseline mental status for patient per aid at bedside.       OVERNIGHT EVENTS:   No acute events overnight.      SUBJECTIVE:    12 Point ROS Negative unless noted otherwise above.  -------------------------------------------------------------------------------  VITAL SIGNS:  Vital Signs Last 24 Hrs  T(C): 36.8 (16 Mar 2021 05:39), Max: 36.8 (15 Mar 2021 12:45)  T(F): 98.2 (16 Mar 2021 05:39), Max: 98.2 (15 Mar 2021 12:45)  HR: 69 (16 Mar 2021 05:39) (57 - 80)  BP: 142/75 (16 Mar 2021 05:39) (98/58 - 142/75)  BP(mean): --  RR: 18 (16 Mar 2021 05:39) (16 - 18)  SpO2: 95% (16 Mar 2021 05:39) (95% - 100%)      PHYSICAL EXAM:    General: Patient lying in bed, NAD, well developed  HEENT: NC/AT; EOMI, PERRLA, anicteric sclera; moist mucosal membranes.  Neck: supple, trachea midline  Cardiovascular: RRR, +S1/S2; NO M/R/G  Respiratory: CTA B/L; no W/R/R  Gastrointestinal: soft, NT/ND; +BSx4  Extremities: Contractures of LUE and LLE. No clubbing, cyanosis, or edema.   Vascular: 2+ radial, DP/PT pulses B/L  Neurological: Alert but not oriented. Not cooperative with commands.  Dermatologic: Skin turgor normal    ALLERGIES:  Allergies    morphine (Other)    Intolerances        MEDICATIONS:  MEDICATIONS  (STANDING):  atorvastatin 40 milliGRAM(s) Oral at bedtime  buPROPion XL . 150 milliGRAM(s) Oral daily  cefTRIAXone   IVPB 1000 milliGRAM(s) IV Intermittent every 24 hours  clopidogrel Tablet 75 milliGRAM(s) Oral daily  famotidine    Tablet 20 milliGRAM(s) Oral daily  heparin   Injectable 5000 Unit(s) SubCutaneous every 8 hours    MEDICATIONS  (PRN):      -------------------------------------------------------------------------------  LABS:                        9.8    10.48 )-----------( 257      ( 16 Mar 2021 07:52 )             29.9     03-16    134<L>  |  102  |  24<H>  ----------------------------<  81  3.7   |  22  |  1.24    Ca    9.4      16 Mar 2021 07:52  Phos  3.1     03-16  Mg     1.9     03-16    TPro  5.8<L>  /  Alb  3.1<L>  /  TBili  0.4  /  DBili  x   /  AST  20  /  ALT  10  /  AlkPhos  81  03-16    LIVER FUNCTIONS - ( 16 Mar 2021 07:52 )  Alb: 3.1 g/dL / Pro: 5.8 g/dL / ALK PHOS: 81 U/L / ALT: 10 U/L / AST: 20 U/L / GGT: x           PT/INR - ( 15 Mar 2021 13:32 )   PT: 27.1 sec;   INR: 2.35          PTT - ( 15 Mar 2021 13:32 )  PTT:57.1 sec  Urinalysis Basic - ( 15 Mar 2021 15:35 )    Color: Brown / Appearance: Clear / SG: >=1.030 / pH: x  Gluc: x / Ketone: Trace mg/dL  / Bili: Small / Urobili: 1.0 E.U./dL   Blood: x / Protein: Trace mg/dL / Nitrite: NEGATIVE   Leuk Esterase: Trace / RBC: < 5 /HPF / WBC 5-10 /HPF   Sq Epi: x / Non Sq Epi: 0-5 /HPF / Bacteria: Present /HPF      CAPILLARY BLOOD GLUCOSE  118 (15 Mar 2021 19:26)      POCT Blood Glucose.: 118 mg/dL (15 Mar 2021 12:06)      Culture - Blood (collected 15 Mar 2021 16:43)  Source: .Blood Blood  Preliminary Report (16 Mar 2021 05:05):    No growth at 12 hours    Culture - Blood (collected 15 Mar 2021 16:43)  Source: .Blood Blood  Preliminary Report (16 Mar 2021 05:05):    No growth at 12 hours      COVID-19 PCR: NotDetec (15 Mar 2021 13:57)      RADIOLOGY & ADDITIONAL TESTS: Reviewed.

## 2021-03-16 NOTE — CONSULT NOTE ADULT - ASSESSMENT
87F with PMHx of DM, HLD, CVA w residual L sided weakness BIBA from home with lethargy and weakness as noted by 24hr HHA, and being treated for UTI and dehydration. Epilepsy consulted for suspected seizure activity given new onset unresponsiveness, and hx of stroke. Most likely altered level of consciousness was related to metabolic derangement (UTI and dehydration).     Recommendations:  - Please discontinue Keppra 250mg Q12hrs      Please call us with questions

## 2021-03-16 NOTE — DISCHARGE NOTE PROVIDER - HOSPITAL COURSE
#Discharge: do not delete  Patient is a 87y old Female w/ pmh DM, HLD, HTN, and CVA 3 months ago who presents with a chief complaint of AMS, weakness found to have UTI and LYSSA.     Problem List/Main Diagnoses (system-based):   Problem/Plan - 1   ·  Problem: Weakness.    Plan: Weakness likely secondary to dehydration 2/2 profuse diarrhea. Less likely acute CVA/TIA, less likely seizure, less likely cardiac event as EKG sinus nancie, trop stable, and pt back to baseline per HHA at bedside s/p fluid resuscitation 1L NS  -Stroke neuro consulted in ED, suggested possible seizure so pt was Keppra loaded; low suspicion of seizure, stopped Keppra low suspicion of seizure as per epilepsy team as well.   -continue to monitor for further episodes of diarrhea.     Problem/Plan - 2   ·  Problem: Dehydration.    Plan: Pt with multiple episodes of diarrhea s/p enema and miralax, after being constipated for 1 week. With marked improvement s/p fluid resuscitation  -encourage PO intake after dysphagia screen. Currently on dysphagia diet.   .     Problem/Plan - 3   ·  Problem: R/O Toxic metabolic encephalopathy.    Plan: lethargy on ED arrival, appreciate neuro recs, no acute CVA, pt also w/ +UA, s/p keppra load, will discontinue keppra because low suspicion of seizure  -Continue Ceftriaxone  -followup ctxs; Blood cultures negative.     Problem/Plan - 4   ·  Problem: LYSSA (acute kidney injury).  Plan: -Cr 1.59 on admission, unknown baseline; Cr WNL 1.24 at 07:52am  - Improving with better hydration.  -Dysphagia diet for the time being.     Problem/Plan - 5   ·  Problem: Anemia.  Plan: -H/H 9.7/29.3 (unknown baseline), normocytic anemia; Stable H/H at 9.8/29.9  -Iron studies at 07:52am: Serum Fe borderline low at 29; TIBC low at 184    Problem/Plan - 6   Problem: UTI (urinary tract infection). Plan: -Pt with positive UA: trace leuk esterase, WBC 5-10, bacteria present  -No complaints of dysuria, no signs of sepsis, afebrile  -WBC decreased from 16.63 to 10.48 this am  -Continue Ceftriaxone 1g q24h for 3 days, will changed to cefpodoxime on discharge.     Problem/Plan - 7   ·  Problem: HTN (hypertension).  Plan: -on home HCTZ 25mg and ramipril 20mg  -holding antihypertensives given pts dehydration and possible LYSSA    Problem/Plan - 8   ·  Problem: CVA (cerebral vascular accident).  Plan: -Pt with history of prior CVA with residual L sided deficits  -c/w ASA 81mg, Plavix 75mg, Atorvastatin 40mg  -Seen by Stroke Neurology on this admission, NIHSS 18  - AMS not thought to be due to an acute stroke.    #MDD  -c/w bupropion 150mg    Dementia  -possibly vascular s/p CVA; currently at baseline, check b12/ folate/ TSH.     Problem/Plan - 9   ·  Problem: Diabetes. A1c 5.1. NOT diabetic at this time.    Inpatient treatment course:   New medications:   Labs to be followed outpatient: None.   Exam to be followed outpatient: None.   #Discharge: do not delete  Patient is a 87y old Female w/ pmh DM, HLD, HTN, and CVA 3 months ago who presents with a chief complaint of AMS, weakness found to have UTI and LYSSA.     Problem List/Main Diagnoses (system-based):   Problem/Plan - 1   ·  Problem: Weakness.    Plan: Weakness likely secondary to dehydration 2/2 profuse diarrhea. Less likely acute CVA/TIA, less likely seizure, less likely cardiac event as EKG sinus nancie, trop stable, and pt back to baseline per HHA at bedside s/p fluid resuscitation 1L NS  -Stroke neuro consulted in ED, suggested possible seizure so pt was Keppra loaded; low suspicion of seizure, stopped Keppra low suspicion of seizure as per epilepsy team as well.   -continue to monitor for further episodes of diarrhea.     Problem/Plan - 2   ·  Problem: Dehydration.    Plan: Pt with multiple episodes of diarrhea s/p enema and miralax, after being constipated for 1 week. With marked improvement s/p fluid resuscitation  -encourage PO intake after dysphagia screen. Currently on dysphagia diet.   .     Problem/Plan - 3   ·  Problem: R/O Toxic metabolic encephalopathy.    Plan: lethargy on ED arrival, appreciate neuro recs, no acute CVA, pt also w/ +UA, s/p keppra load, will discontinue keppra because low suspicion of seizure  -Continue Ceftriaxone  -followup ctxs; Blood cultures negative.     Problem/Plan - 4   ·  Problem: LYSSA (acute kidney injury).  Plan: -Cr 1.59 on admission, unknown baseline; Cr WNL 1.24 at 07:52am  - Improving with better hydration.  -Dysphagia diet for the time being.     Problem/Plan - 5   ·  Problem: Anemia.  Plan: -H/H 9.7/29.3 (unknown baseline), normocytic anemia; Stable H/H at 9.8/29.9  -Iron studies at 07:52am: Serum Fe borderline low at 29; TIBC low at 184    Problem/Plan - 6   Problem: UTI (urinary tract infection). Plan: -Pt with positive UA: trace leuk esterase, WBC 5-10, bacteria present  -No complaints of dysuria, no signs of sepsis, afebrile  -WBC decreased from 16.63 to 10.48 this am  -Continue Ceftriaxone 1g q24h for 3 days, will changed to cefpodoxime on discharge.     Problem/Plan - 7   ·  Problem: HTN (hypertension).  Plan: -on home HCTZ 25mg and ramipril 20mg  -holding antihypertensives given pts dehydration and possible LYSSA    Problem/Plan - 8   ·  Problem: CVA (cerebral vascular accident).  Plan: -Pt with history of prior CVA with residual L sided deficits  -c/w ASA 81mg, Plavix 75mg, Atorvastatin 40mg  -Seen by Stroke Neurology on this admission, NIHSS 18  - AMS not thought to be due to an acute stroke.    #MDD  -c/w bupropion 150mg    Dementia  -possibly vascular s/p CVA; currently at baseline, check b12/ folate/ TSH.     Problem/Plan - 9   ·  Problem: Diabetes. A1c 5.1. NOT diabetic at this time.    Inpatient treatment course:   Patient is a 87y old Female w/ pmh DM, HLD, HTN, and CVA 3 months ago who presents with a chief complaint of AMS, weakness found to have UTI and LYSSA. In the hospital the pt received fluids and her mental status improved. She was found to have a UTI and was started on CTX for UTI treatment. She should be continued on it for 3 day course total. Will be sent home with abx and ramipril. HCTZ stopped at this time due to hypotension previously and mild hyponatremia.  Should follow up with [facility].   New medications: Cefpodoxime   Labs to be followed outpatient: None.   Exam to be followed outpatient: BP Check.

## 2021-03-16 NOTE — DISCHARGE NOTE NURSING/CASE MANAGEMENT/SOCIAL WORK - NSDCFUADDAPPT_GEN_ALL_CORE_FT
Please follow up with your Primary Care Provider, Dr. Aleyda Vogel on behalf of Dr. Jannette Winters at [address] [place] [address], [address], [place] [address] on 03/22/2021 at 1:30pm.    Please bring your Insurance card, Photo ID and Discharge paperwork to your appointment.    Appointment was scheduled by Ms. TANO Nichols, Referral Coordinator.

## 2021-03-16 NOTE — PROGRESS NOTE ADULT - PROBLEM SELECTOR PLAN 3
lethargy on ED arrival, appreciate neuro recs, no acute CVA, pt also w/ +UA, s/p keppra load, will discontinue keppra because low suspicion of seizure  -followup VEEG if patient's mental status diminishes  -Continue Ceftriaxone  -followup ctxs; Blood cultures negative

## 2021-03-16 NOTE — SWALLOW BEDSIDE ASSESSMENT ADULT - NS SPL SWALLOW CLINIC TRIAL FT
Oral stage was significant for decreased bolus containment with left lateral spillage in setting of left sided weakness s/p CVA and prolonged bolus formation. Pt required max cues to accept PO trials. There were no overt signs of airway protection deficits during this exam. However, given AMS and tendency to talk with a mouthful of food, Pt is at increased aspiration risk. Aspiration precautions should be maintained. This service will monitor tolerance.

## 2021-03-16 NOTE — DISCHARGE NOTE PROVIDER - PROVIDER TOKENS
PROVIDER:[TOKEN:[[id number]],FOLLOWUP:[2 weeks]] FREE:[LAST:[Lela],FIRST:[Aleyda],PHONE:[phone]],FAX:[(   [phone]    -],ADDRESS:[INTERNAL MEDICINE  [address] [place] [address], [address] [place] [address]],SCHEDULEDAPPT:[03/22/2021],SCHEDULEDAPPTTIME:[01:30 PM]]

## 2021-03-16 NOTE — PROGRESS NOTE ADULT - PROBLEM SELECTOR PLAN 1
Weakness likely secondary to dehydration 2/2 profuse diarrhea. Less likely acute CVA/TIA, less likely seizure, less likely cardiac event as EKG sinus nancie, trop stable, and pt back to baseline per HHA at bedside s/p fluid resuscitation 1L NS  -Stroke neuro consulted in ED, suggested possible seizure so pt was Keppra loaded; low suspicion of seizure, stopped Keppra  -f/u Stroke Neuro recs  -encourage PO intake, after bedside dysphagia screen  -continue to monitor for further episodes of diarrhea

## 2021-03-17 LAB
FOLATE SERPL-MCNC: 6.5 NG/ML — SIGNIFICANT CHANGE UP
VIT B12 SERPL-MCNC: 625 PG/ML — SIGNIFICANT CHANGE UP (ref 232–1245)

## 2021-03-17 RX ORDER — CEFPODOXIME PROXETIL 100 MG
1 TABLET ORAL
Qty: 4 | Refills: 0
Start: 2021-03-17 | End: 2021-03-18

## 2021-03-20 LAB
CULTURE RESULTS: SIGNIFICANT CHANGE UP
CULTURE RESULTS: SIGNIFICANT CHANGE UP
SPECIMEN SOURCE: SIGNIFICANT CHANGE UP
SPECIMEN SOURCE: SIGNIFICANT CHANGE UP

## 2021-03-29 DIAGNOSIS — R41.82 ALTERED MENTAL STATUS, UNSPECIFIED: ICD-10-CM

## 2021-03-29 DIAGNOSIS — R79.1 ABNORMAL COAGULATION PROFILE: ICD-10-CM

## 2021-03-29 DIAGNOSIS — I95.9 HYPOTENSION, UNSPECIFIED: ICD-10-CM

## 2021-03-29 DIAGNOSIS — K59.00 CONSTIPATION, UNSPECIFIED: ICD-10-CM

## 2021-03-29 DIAGNOSIS — E86.0 DEHYDRATION: ICD-10-CM

## 2021-03-29 DIAGNOSIS — D64.9 ANEMIA, UNSPECIFIED: ICD-10-CM

## 2021-03-29 DIAGNOSIS — Z66 DO NOT RESUSCITATE: ICD-10-CM

## 2021-03-29 DIAGNOSIS — R19.7 DIARRHEA, UNSPECIFIED: ICD-10-CM

## 2021-03-29 DIAGNOSIS — F03.90 UNSPECIFIED DEMENTIA, UNSPECIFIED SEVERITY, WITHOUT BEHAVIORAL DISTURBANCE, PSYCHOTIC DISTURBANCE, MOOD DISTURBANCE, AND ANXIETY: ICD-10-CM

## 2021-03-29 DIAGNOSIS — I69.354 HEMIPLEGIA AND HEMIPARESIS FOLLOWING CEREBRAL INFARCTION AFFECTING LEFT NON-DOMINANT SIDE: ICD-10-CM

## 2021-03-29 DIAGNOSIS — N17.9 ACUTE KIDNEY FAILURE, UNSPECIFIED: ICD-10-CM

## 2021-03-29 DIAGNOSIS — N39.0 URINARY TRACT INFECTION, SITE NOT SPECIFIED: ICD-10-CM

## 2021-03-29 DIAGNOSIS — E78.00 PURE HYPERCHOLESTEROLEMIA, UNSPECIFIED: ICD-10-CM

## 2021-03-29 DIAGNOSIS — Z74.01 BED CONFINEMENT STATUS: ICD-10-CM

## 2021-03-29 DIAGNOSIS — I10 ESSENTIAL (PRIMARY) HYPERTENSION: ICD-10-CM

## 2021-03-29 DIAGNOSIS — E11.9 TYPE 2 DIABETES MELLITUS WITHOUT COMPLICATIONS: ICD-10-CM

## 2021-05-20 NOTE — PATIENT PROFILE ADULT - DO YOU LACK THE NECESSARY SUPPORT TO HELP YOU COPE WITH LIFE CHALLENGES?
Patient is interested in wanting to establish with an oncologist here at [facility]. Is wondering if there is anyone you can refer her to. Let me know how to best accomplish this. Thanks!
Saw patient for MTM. Discussed making a direct switch from triamterene/hydrochlorothiazide to hydrochlorothiazide 25 mg daily. Literature supports a direct switch. I would recommend checking BMP in 2 weeks to make sure potassium looks okay. Let me know if you agree with this plan and I can place the orders and notify the patient.
yes

## 2021-08-18 NOTE — ED PROVIDER NOTE - NS ED SCRIBE STATEMENT
Attending Doxepin Pregnancy And Lactation Text: This medication is Pregnancy Category C and it isn't known if it is safe during pregnancy. It is also excreted in breast milk and breast feeding isn't recommended.

## 2022-07-17 ENCOUNTER — INPATIENT (INPATIENT)
Facility: HOSPITAL | Age: 87
LOS: 10 days | Discharge: HOSPICE | DRG: 689 | End: 2022-07-28
Attending: INTERNAL MEDICINE | Admitting: STUDENT IN AN ORGANIZED HEALTH CARE EDUCATION/TRAINING PROGRAM
Payer: MEDICARE

## 2022-07-17 VITALS
HEIGHT: 64 IN | HEART RATE: 65 BPM | DIASTOLIC BLOOD PRESSURE: 56 MMHG | WEIGHT: 112.88 LBS | RESPIRATION RATE: 20 BRPM | TEMPERATURE: 100 F | SYSTOLIC BLOOD PRESSURE: 118 MMHG | OXYGEN SATURATION: 100 %

## 2022-07-17 DIAGNOSIS — F32.9 MAJOR DEPRESSIVE DISORDER, SINGLE EPISODE, UNSPECIFIED: ICD-10-CM

## 2022-07-17 DIAGNOSIS — I63.9 CEREBRAL INFARCTION, UNSPECIFIED: ICD-10-CM

## 2022-07-17 DIAGNOSIS — D64.9 ANEMIA, UNSPECIFIED: ICD-10-CM

## 2022-07-17 DIAGNOSIS — I10 ESSENTIAL (PRIMARY) HYPERTENSION: ICD-10-CM

## 2022-07-17 DIAGNOSIS — G92.8 OTHER TOXIC ENCEPHALOPATHY: ICD-10-CM

## 2022-07-17 DIAGNOSIS — E11.9 TYPE 2 DIABETES MELLITUS WITHOUT COMPLICATIONS: ICD-10-CM

## 2022-07-17 DIAGNOSIS — N39.0 URINARY TRACT INFECTION, SITE NOT SPECIFIED: ICD-10-CM

## 2022-07-17 DIAGNOSIS — J96.01 ACUTE RESPIRATORY FAILURE WITH HYPOXIA: ICD-10-CM

## 2022-07-17 DIAGNOSIS — Z29.9 ENCOUNTER FOR PROPHYLACTIC MEASURES, UNSPECIFIED: ICD-10-CM

## 2022-07-17 DIAGNOSIS — R77.8 OTHER SPECIFIED ABNORMALITIES OF PLASMA PROTEINS: ICD-10-CM

## 2022-07-17 DIAGNOSIS — J96.21 ACUTE AND CHRONIC RESPIRATORY FAILURE WITH HYPOXIA: ICD-10-CM

## 2022-07-17 PROBLEM — E78.5 HYPERLIPIDEMIA, UNSPECIFIED: Chronic | Status: ACTIVE | Noted: 2021-03-16

## 2022-07-17 LAB
ALBUMIN SERPL ELPH-MCNC: 4 G/DL — SIGNIFICANT CHANGE UP (ref 3.3–5)
ALP SERPL-CCNC: 93 U/L — SIGNIFICANT CHANGE UP (ref 40–120)
ALT FLD-CCNC: 14 U/L — SIGNIFICANT CHANGE UP (ref 10–45)
ANION GAP SERPL CALC-SCNC: 10 MMOL/L — SIGNIFICANT CHANGE UP (ref 5–17)
APPEARANCE UR: ABNORMAL
APTT BLD: 28.3 SEC — SIGNIFICANT CHANGE UP (ref 27.5–35.5)
AST SERPL-CCNC: 22 U/L — SIGNIFICANT CHANGE UP (ref 10–40)
BACTERIA # UR AUTO: PRESENT /HPF
BASE EXCESS BLDV CALC-SCNC: 3.9 MMOL/L — HIGH (ref -2–3)
BASOPHILS # BLD AUTO: 0.06 K/UL — SIGNIFICANT CHANGE UP (ref 0–0.2)
BASOPHILS NFR BLD AUTO: 0.6 % — SIGNIFICANT CHANGE UP (ref 0–2)
BILIRUB SERPL-MCNC: 0.4 MG/DL — SIGNIFICANT CHANGE UP (ref 0.2–1.2)
BILIRUB UR-MCNC: ABNORMAL
BLD GP AB SCN SERPL QL: NEGATIVE — SIGNIFICANT CHANGE UP
BUN SERPL-MCNC: 15 MG/DL — SIGNIFICANT CHANGE UP (ref 7–23)
CALCIUM SERPL-MCNC: 9.8 MG/DL — SIGNIFICANT CHANGE UP (ref 8.4–10.5)
CHLORIDE SERPL-SCNC: 101 MMOL/L — SIGNIFICANT CHANGE UP (ref 96–108)
CK MB CFR SERPL CALC: 2.3 NG/ML — SIGNIFICANT CHANGE UP (ref 0–6.7)
CK SERPL-CCNC: 121 U/L — SIGNIFICANT CHANGE UP (ref 25–170)
CO2 BLDV-SCNC: 29.8 MMOL/L — HIGH (ref 22–26)
CO2 SERPL-SCNC: 26 MMOL/L — SIGNIFICANT CHANGE UP (ref 22–31)
COLOR SPEC: YELLOW — SIGNIFICANT CHANGE UP
COMMENT - URINE: SIGNIFICANT CHANGE UP
CREAT SERPL-MCNC: 0.68 MG/DL — SIGNIFICANT CHANGE UP (ref 0.5–1.3)
DIFF PNL FLD: ABNORMAL
EGFR: 84 ML/MIN/1.73M2 — SIGNIFICANT CHANGE UP
EOSINOPHIL # BLD AUTO: 0.02 K/UL — SIGNIFICANT CHANGE UP (ref 0–0.5)
EOSINOPHIL NFR BLD AUTO: 0.2 % — SIGNIFICANT CHANGE UP (ref 0–6)
EPI CELLS # UR: SIGNIFICANT CHANGE UP /HPF (ref 0–5)
GAS PNL BLDV: SIGNIFICANT CHANGE UP
GLUCOSE SERPL-MCNC: 123 MG/DL — HIGH (ref 70–99)
GLUCOSE UR QL: NEGATIVE — SIGNIFICANT CHANGE UP
HCO3 BLDV-SCNC: 28 MMOL/L — SIGNIFICANT CHANGE UP (ref 22–29)
HCT VFR BLD CALC: 31.2 % — LOW (ref 34.5–45)
HGB BLD-MCNC: 9.7 G/DL — LOW (ref 11.5–15.5)
IMM GRANULOCYTES NFR BLD AUTO: 0.4 % — SIGNIFICANT CHANGE UP (ref 0–1.5)
INR BLD: 0.98 — SIGNIFICANT CHANGE UP (ref 0.88–1.16)
KETONES UR-MCNC: NEGATIVE — SIGNIFICANT CHANGE UP
LACTATE SERPL-SCNC: 1.2 MMOL/L — SIGNIFICANT CHANGE UP (ref 0.5–2)
LEUKOCYTE ESTERASE UR-ACNC: ABNORMAL
LYMPHOCYTES # BLD AUTO: 1.62 K/UL — SIGNIFICANT CHANGE UP (ref 1–3.3)
LYMPHOCYTES # BLD AUTO: 17.1 % — SIGNIFICANT CHANGE UP (ref 13–44)
MAGNESIUM SERPL-MCNC: 2 MG/DL — SIGNIFICANT CHANGE UP (ref 1.6–2.6)
MCHC RBC-ENTMCNC: 26.3 PG — LOW (ref 27–34)
MCHC RBC-ENTMCNC: 31.1 GM/DL — LOW (ref 32–36)
MCV RBC AUTO: 84.6 FL — SIGNIFICANT CHANGE UP (ref 80–100)
MONOCYTES # BLD AUTO: 0.52 K/UL — SIGNIFICANT CHANGE UP (ref 0–0.9)
MONOCYTES NFR BLD AUTO: 5.5 % — SIGNIFICANT CHANGE UP (ref 2–14)
NEUTROPHILS # BLD AUTO: 7.24 K/UL — SIGNIFICANT CHANGE UP (ref 1.8–7.4)
NEUTROPHILS NFR BLD AUTO: 76.2 % — SIGNIFICANT CHANGE UP (ref 43–77)
NITRITE UR-MCNC: POSITIVE
NRBC # BLD: 0 /100 WBCS — SIGNIFICANT CHANGE UP (ref 0–0)
PCO2 BLDV: 42 MMHG — SIGNIFICANT CHANGE UP (ref 39–42)
PH BLDV: 7.44 — HIGH (ref 7.32–7.43)
PH UR: 6.5 — SIGNIFICANT CHANGE UP (ref 5–8)
PLATELET # BLD AUTO: 341 K/UL — SIGNIFICANT CHANGE UP (ref 150–400)
PO2 BLDV: 56 MMHG — HIGH (ref 25–45)
POTASSIUM SERPL-MCNC: 4.4 MMOL/L — SIGNIFICANT CHANGE UP (ref 3.5–5.3)
POTASSIUM SERPL-SCNC: 4.4 MMOL/L — SIGNIFICANT CHANGE UP (ref 3.5–5.3)
PROT SERPL-MCNC: 7.2 G/DL — SIGNIFICANT CHANGE UP (ref 6–8.3)
PROT UR-MCNC: NEGATIVE MG/DL — SIGNIFICANT CHANGE UP
PROTHROM AB SERPL-ACNC: 11.7 SEC — SIGNIFICANT CHANGE UP (ref 10.5–13.4)
RAPID RVP RESULT: SIGNIFICANT CHANGE UP
RBC # BLD: 3.69 M/UL — LOW (ref 3.8–5.2)
RBC # FLD: 15 % — HIGH (ref 10.3–14.5)
RBC CASTS # UR COMP ASSIST: < 5 /HPF — SIGNIFICANT CHANGE UP
RH IG SCN BLD-IMP: NEGATIVE — SIGNIFICANT CHANGE UP
S PNEUM AG UR QL: NEGATIVE — SIGNIFICANT CHANGE UP
SAO2 % BLDV: 87.1 % — SIGNIFICANT CHANGE UP (ref 67–88)
SARS-COV-2 RNA SPEC QL NAA+PROBE: NEGATIVE — SIGNIFICANT CHANGE UP
SARS-COV-2 RNA SPEC QL NAA+PROBE: SIGNIFICANT CHANGE UP
SODIUM SERPL-SCNC: 137 MMOL/L — SIGNIFICANT CHANGE UP (ref 135–145)
SP GR SPEC: 1.01 — SIGNIFICANT CHANGE UP (ref 1–1.03)
TROPONIN T SERPL-MCNC: 0.02 NG/ML — HIGH (ref 0–0.01)
TROPONIN T SERPL-MCNC: 0.02 NG/ML — HIGH (ref 0–0.01)
UROBILINOGEN FLD QL: 0.2 E.U./DL — SIGNIFICANT CHANGE UP
WBC # BLD: 9.5 K/UL — SIGNIFICANT CHANGE UP (ref 3.8–10.5)
WBC # FLD AUTO: 9.5 K/UL — SIGNIFICANT CHANGE UP (ref 3.8–10.5)
WBC UR QL: ABNORMAL /HPF

## 2022-07-17 PROCEDURE — 93010 ELECTROCARDIOGRAM REPORT: CPT

## 2022-07-17 PROCEDURE — 71045 X-RAY EXAM CHEST 1 VIEW: CPT | Mod: 26

## 2022-07-17 PROCEDURE — 99222 1ST HOSP IP/OBS MODERATE 55: CPT

## 2022-07-17 PROCEDURE — 99285 EMERGENCY DEPT VISIT HI MDM: CPT

## 2022-07-17 RX ORDER — FAMOTIDINE 10 MG/ML
0 INJECTION INTRAVENOUS
Qty: 0 | Refills: 0 | DISCHARGE

## 2022-07-17 RX ORDER — ATORVASTATIN CALCIUM 80 MG/1
0 TABLET, FILM COATED ORAL
Qty: 0 | Refills: 0 | DISCHARGE

## 2022-07-17 RX ORDER — VANCOMYCIN HCL 1 G
1000 VIAL (EA) INTRAVENOUS ONCE
Refills: 0 | Status: COMPLETED | OUTPATIENT
Start: 2022-07-17 | End: 2022-07-17

## 2022-07-17 RX ORDER — BUPROPION HYDROCHLORIDE 150 MG/1
0 TABLET, EXTENDED RELEASE ORAL
Qty: 0 | Refills: 0 | DISCHARGE

## 2022-07-17 RX ORDER — GABAPENTIN 400 MG/1
0 CAPSULE ORAL
Qty: 0 | Refills: 0 | DISCHARGE

## 2022-07-17 RX ORDER — MULTIVIT-MIN/FERROUS GLUCONATE 9 MG/15 ML
1 LIQUID (ML) ORAL
Qty: 0 | Refills: 0 | DISCHARGE

## 2022-07-17 RX ORDER — IPRATROPIUM/ALBUTEROL SULFATE 18-103MCG
3 AEROSOL WITH ADAPTER (GRAM) INHALATION EVERY 6 HOURS
Refills: 0 | Status: DISCONTINUED | OUTPATIENT
Start: 2022-07-17 | End: 2022-07-20

## 2022-07-17 RX ORDER — SODIUM CHLORIDE 9 MG/ML
1000 INJECTION INTRAMUSCULAR; INTRAVENOUS; SUBCUTANEOUS
Refills: 0 | Status: DISCONTINUED | OUTPATIENT
Start: 2022-07-17 | End: 2022-07-19

## 2022-07-17 RX ORDER — PIPERACILLIN AND TAZOBACTAM 4; .5 G/20ML; G/20ML
3.38 INJECTION, POWDER, LYOPHILIZED, FOR SOLUTION INTRAVENOUS ONCE
Refills: 0 | Status: COMPLETED | OUTPATIENT
Start: 2022-07-17 | End: 2022-07-17

## 2022-07-17 RX ORDER — RAMIPRIL 5 MG
0 CAPSULE ORAL
Qty: 0 | Refills: 0 | DISCHARGE

## 2022-07-17 RX ORDER — SODIUM CHLORIDE 9 MG/ML
1000 INJECTION INTRAMUSCULAR; INTRAVENOUS; SUBCUTANEOUS
Refills: 0 | Status: DISCONTINUED | OUTPATIENT
Start: 2022-07-17 | End: 2022-07-17

## 2022-07-17 RX ORDER — SENNA PLUS 8.6 MG/1
0 TABLET ORAL
Qty: 0 | Refills: 0 | DISCHARGE

## 2022-07-17 RX ORDER — SODIUM CHLORIDE 9 MG/ML
250 INJECTION INTRAMUSCULAR; INTRAVENOUS; SUBCUTANEOUS ONCE
Refills: 0 | Status: COMPLETED | OUTPATIENT
Start: 2022-07-17 | End: 2022-07-17

## 2022-07-17 RX ORDER — AMLODIPINE BESYLATE 2.5 MG/1
1 TABLET ORAL
Qty: 0 | Refills: 0 | DISCHARGE

## 2022-07-17 RX ORDER — PIPERACILLIN AND TAZOBACTAM 4; .5 G/20ML; G/20ML
2.25 INJECTION, POWDER, LYOPHILIZED, FOR SOLUTION INTRAVENOUS EVERY 6 HOURS
Refills: 0 | Status: DISCONTINUED | OUTPATIENT
Start: 2022-07-17 | End: 2022-07-18

## 2022-07-17 RX ORDER — CLOPIDOGREL BISULFATE 75 MG/1
0 TABLET, FILM COATED ORAL
Qty: 0 | Refills: 0 | DISCHARGE

## 2022-07-17 RX ORDER — ENOXAPARIN SODIUM 100 MG/ML
40 INJECTION SUBCUTANEOUS EVERY 24 HOURS
Refills: 0 | Status: DISCONTINUED | OUTPATIENT
Start: 2022-07-17 | End: 2022-07-19

## 2022-07-17 RX ORDER — RAMIPRIL 5 MG
2 CAPSULE ORAL
Qty: 0 | Refills: 0 | DISCHARGE

## 2022-07-17 RX ORDER — CHOLECALCIFEROL (VITAMIN D3) 125 MCG
0 CAPSULE ORAL
Qty: 0 | Refills: 0 | DISCHARGE

## 2022-07-17 RX ADMIN — SODIUM CHLORIDE 120 MILLILITER(S): 9 INJECTION INTRAMUSCULAR; INTRAVENOUS; SUBCUTANEOUS at 16:07

## 2022-07-17 RX ADMIN — PIPERACILLIN AND TAZOBACTAM 3.38 GRAM(S): 4; .5 INJECTION, POWDER, LYOPHILIZED, FOR SOLUTION INTRAVENOUS at 13:00

## 2022-07-17 RX ADMIN — SODIUM CHLORIDE 250 MILLILITER(S): 9 INJECTION INTRAMUSCULAR; INTRAVENOUS; SUBCUTANEOUS at 13:16

## 2022-07-17 RX ADMIN — Medication 250 MILLIGRAM(S): at 12:57

## 2022-07-17 RX ADMIN — Medication 3 MILLILITER(S): at 17:30

## 2022-07-17 RX ADMIN — PIPERACILLIN AND TAZOBACTAM 200 GRAM(S): 4; .5 INJECTION, POWDER, LYOPHILIZED, FOR SOLUTION INTRAVENOUS at 12:30

## 2022-07-17 RX ADMIN — PIPERACILLIN AND TAZOBACTAM 200 GRAM(S): 4; .5 INJECTION, POWDER, LYOPHILIZED, FOR SOLUTION INTRAVENOUS at 19:06

## 2022-07-17 RX ADMIN — ENOXAPARIN SODIUM 40 MILLIGRAM(S): 100 INJECTION SUBCUTANEOUS at 19:06

## 2022-07-17 RX ADMIN — Medication 3 MILLILITER(S): at 21:59

## 2022-07-17 RX ADMIN — SODIUM CHLORIDE 80 MILLILITER(S): 9 INJECTION INTRAMUSCULAR; INTRAVENOUS; SUBCUTANEOUS at 16:52

## 2022-07-17 NOTE — ED ADULT NURSE NOTE - NSIMPLEMENTINTERV_GEN_ALL_ED
Implemented All Fall with Harm Risk Interventions:  [place] to call system. Call bell, personal items and telephone within reach. Instruct patient to call for assistance. Room bathroom lighting operational. Non-slip footwear when patient is off stretcher. Physically safe environment: no spills, clutter or unnecessary equipment. Stretcher in lowest position, wheels locked, appropriate side rails in place. Provide visual cue, wrist band, yellow gown, etc. Monitor gait and stability. Monitor for mental status changes and reorient to person, place, and time. Review medications for side effects contributing to fall risk. Reinforce activity limits and safety measures with patient and family. Provide visual clues: red socks.

## 2022-07-17 NOTE — ED ADULT NURSE NOTE - OBJECTIVE STATEMENT
Received via stretcher BIBEMS with chief complaints of increased AMS, cough and decreased responsiveness for last two days. Cognitive impairment, incontinence at the baseline. Family @  bedside.  +PERRLA.  Any form of distress not noted.  Chest rise equal and symmetrical bilaterally, trachea midline. Abdomen +bowel sounds in all 4 quadrants, no pulsatile mass noted. Moves all extremities. Contractures of lower extremities noted. All needs attended. POC reviewed. Placed on continuos cardiac monitoring. Hourly rounding in progress. Fall risk precautions maintained.

## 2022-07-17 NOTE — H&P ADULT - ATTENDING COMMENTS
In brief, this is an 88F with multiple CVA (bedbound, 24/7 HHA, non-verbal), HTN and HLD presenting from home with altered mental status and failure to thrive. ROS unobtainable and most of the history was obtained from patient's HHA who brought her for evaluation. She was found to be hypoxic to SpO2 in the 80s. Initially placed on 15L NRB and later transitioned to 4L low-flow NC. CXR is extremely rotated and difficult to assess the left lower lung zone. Although no clear opacities are seen on CXR, chest imaging may lag behind in the setting of a hypovolemic status. Reportedly she has been having a productive cough which she has not been able to clear up. As per HHA, it seems that she has been having trouble swallowing solid food and may have had a few aspiration events leading up to her presentation. Additionally, she was found to have a positive UA indicative of UTI. Lactate negative.    # Toxic metabolic encephalopathy likely secondary to an infectious etiology (UTI vs. pneumonia)  # Urinary tract infection  # Aspiration pneumonia vs. pneumonitis  # Failure to thrive  # History of prior CVAs  # Hypertension  # Hyperlipidemia    Plan:  - IV fluid hydration with NS at 70cc/hr for 12 hours and reassess  - Send septic workup including 2 sets of blood cultures, respiratory cultures, urinary culture (in the setting of a positive UA), RVP, legionella   - Broad spectrum abx with Zosyn  - NPO for now, SLP evaluation  - Strict aspiration precautions  - Palliative care consult  - DVT ppx

## 2022-07-17 NOTE — ED PROVIDER NOTE - NSICDXPASTMEDICALHX_GEN_ALL_CORE_FT
PAST MEDICAL HISTORY:  Cerebrovascular accident (CVA)     CVA (cerebral vascular accident)     Diabetes     DM (diabetes mellitus)     HLD (hyperlipidemia)     HLD (hyperlipidemia)     HTN (hypertension)

## 2022-07-17 NOTE — H&P ADULT - HISTORY OF PRESENT ILLNESS
87 yo with ho of HTN, HLD, CVA, non verbal / cognitive impairment, bedbound, lives at home with help of home health aide brought in after being found altered by HHA.     ED course:  Vitals: 99.6F, HR 65, /56, 15L NRB --> 99% on 5L NC  Labs: wbc 9.5, Hb 9.7, BUN 15/Cr 0.68, lactate 1.2 , trop 0.02, VBG, 7.44 pco2 42, po2 56, UA+ LE & nitrite   Imaging: CXR L elevated hemidiaphram, no acute infiltrates, EKG WNL  Interventions: vanc& zosyn, 250cc NS --> NS 120cc/hr  89 yo with ho of HTN, HLD, CVA, non verbal / cognitive impairment, bedbound, lives at home with help of home health aide brought in after being found altered by HHA. Per HHA and family friend, patient is minimally verbal at baseline, can answer yes/no questions and sometimes string 2-3 words together. Since thursday, noticed patient was congestion and coughing, with decr PO intake and alertness. Notified outpatient PCP Dr. Madison Winters and was given robitussin. No obvious episodes of choking/aspirating on food. Normally eats regular gluten free diet, but since thursday only would eat food pureed by . No fever, chills, SOB, melena, hematuria per HHA. Brought pt to ED because home pulse ox showed o2 at 85%. Attempted to obtain ROS from patient but would not participate. Per family friend, patient sometimes chooses not to respond.     ED course:  Vitals: 99.6F, HR 65, /56, 15L NRB --> 99% on 5L NC  Labs: wbc 9.5, Hb 9.7, BUN 15/Cr 0.68, lactate 1.2 , trop 0.02, VBG, 7.44 pco2 42, po2 56, UA+ LE & nitrite   Imaging: CXR L elevated hemidiaphram, no acute infiltrates, EKG WNL  Interventions: vanc& zosyn, 250cc NS --> NS 120cc/hr

## 2022-07-17 NOTE — H&P ADULT - PROBLEM SELECTOR PLAN 8
F: NC 120cc/hr  E: replete to K>4, Mg>2, Phos>2.5  N:   Ppx:    Code Status:    Dispo: Hx DM, no currently on any medication.   - f/u A1c  - c/w mISS Hx DM, no currently on any medication.   - f/u A1c  - NPO   - c/w mISS Hx DM, no currently on any medication.   - f/u a1c  - NPO   - mISS when tolerating PO

## 2022-07-17 NOTE — ED ADULT TRIAGE NOTE - CHIEF COMPLAINT QUOTE
Pt brought in by EMS for altered mental status this morning. Pt uses hand gestures to communicate at baseline, aphasic d/t prior stroke. Pt arrives unresponsive, HHA states pt had a cough over the weekend but was awake and alert, now unresponsive to pain.

## 2022-07-17 NOTE — H&P ADULT - PROBLEM SELECTOR PLAN 10
F: NC 120cc/hr  E: replete to K>4, Mg>2, Phos>2.5  N:   Ppx:    Code Status:    Dispo: F: NC 120cc/hr  E: replete to K>4, Mg>2, Phos>2.5  N: NPO pending mental status improvement/dysphagia screen  Ppx: lovenox    Code Status: DNR - MOLST in chart     Dispo: [facility] F: NC 80cc/hr  E: replete to K>4, Mg>2, Phos>2.5  N: NPO pending mental status improvement/dysphagia screen  Ppx: lovenox    Code Status: DNR - MOLST in chart     Dispo: [facility]

## 2022-07-17 NOTE — H&P ADULT - NSHPLABSRESULTS_GEN_ALL_CORE
.  LABS:                         9.7    9.50  )-----------( 341      ( 2022 12:21 )             31.2         137  |  101  |  15  ----------------------------<  123<H>  4.4   |  26  |  0.68    Ca    9.8      2022 12:21  Mg     2.0         TPro  7.2  /  Alb  4.0  /  TBili  0.4  /  DBili  x   /  AST  22  /  ALT  14  /  AlkPhos  93      PT/INR - ( 2022 12:21 )   PT: 11.7 sec;   INR: 0.98          PTT - ( 2022 12:21 )  PTT:28.3 sec  Urinalysis Basic - ( 2022 12:38 )    Color: Yellow / Appearance: Turbid / S.015 / pH: x  Gluc: x / Ketone: NEGATIVE  / Bili: Small / Urobili: 0.2 E.U./dL   Blood: x / Protein: NEGATIVE mg/dL / Nitrite: POSITIVE   Leuk Esterase: Large / RBC: < 5 /HPF / WBC Many /HPF   Sq Epi: x / Non Sq Epi: 0-5 /HPF / Bacteria: Present /HPF      CARDIAC MARKERS ( 2022 12:21 )  x     / 0.02 ng/mL / x     / x     / x            Lactate, Blood: 1.2 mmol/L ( @ 12:20)      RADIOLOGY, EKG & ADDITIONAL TESTS: Reviewed.

## 2022-07-17 NOTE — H&P ADULT - PROBLEM SELECTOR PLAN 2
Urine + LE and nitrite. Unable to assess if patient symptomatic however more altered/lethargic from baseline. No leukocytosis, negative lactate, not meeting SIRS criteria.   - CTX 1g q24hr x3 days Urine + LE and nitrite. Unable to assess if patient symptomatic however more altered/lethargic from baseline. No leukocytosis, negative lactate, not meeting SIRS criteria.   - c/w zosyns 3.375 q6hr  - f/u Ucx

## 2022-07-17 NOTE — PATIENT PROFILE ADULT - FALL HARM RISK - HARM RISK INTERVENTIONS
Assistance with ambulation/Assistance OOB with selected safe patient handling equipment/Communicate Risk of Fall with Harm to all staff/Discuss with provider need for PT consult/Monitor gait and stability/Reinforce activity limits and safety measures with patient and family/Tailored Fall Risk Interventions/Visual Cue: Yellow wristband and red socks/Bed in lowest position, wheels locked, appropriate side rails in place/Call bell, personal items and telephone in reach/Instruct patient to call for assistance before getting out of bed or chair/Non-slip footwear when patient is out of bed/[place] to call system/Physically safe environment - no spills, clutter or unnecessary equipment/Purposeful Proactive Rounding/Room/bathroom lighting operational, light cord in reach

## 2022-07-17 NOTE — H&P ADULT - PROBLEM SELECTOR PLAN 4
Known hx of anemia (baseline 2021 9.8). Iron studies in 03/2021 showed Fe borderline low at 29; TIBC low at 184. No signs of active bleeding, no melena.   - monitor H/H  - active T&S  - transfuse Hb <7.0 Presenting with troponin 0.02, unable to assess if patient is having chest pain. EKG with 1 ST elevation in V2, and 1 ST elevation in V3 however not continuous  - f/u CK/ CKMB  - repeat trop 6pm  - if uptrending, repeat EKG & call cardiology Presenting with troponin 0.02, unable to assess if patient is having chest pain. EKG with 1 ST elevation in V2, and 1 ST elevation in V3 however not continuous  - other cardiac enzymes WNL: , CKMB 1.9   - repeat trop 6pm  - if up trending, repeat EKG

## 2022-07-17 NOTE — ED PROVIDER NOTE - MUSCULOSKELETAL, MLM
Spine appears normal, range of motion is not limited, no muscle or joint tenderness multiple contractures

## 2022-07-17 NOTE — H&P ADULT - PROBLEM SELECTOR PLAN 9
Hx HTN Hx HTN. home amlodipine 10mg, ramipril 20mg qd   - hold for now given AMS    #HLD: lipitor 40  - hold for now given AMS

## 2022-07-17 NOTE — H&P ADULT - NSHPPHYSICALEXAM_GEN_ALL_CORE
T(C): 37 (07-17-22 @ 14:00), Max: 37.6 (07-17-22 @ 12:26)  HR: 65 (07-17-22 @ 14:00) (57 - 67)  BP: 124/59 (07-17-22 @ 14:00) (118/56 - 152/65)  RR: 16 (07-17-22 @ 14:00) (16 - 20)  SpO2: 100% (07-17-22 @ 14:00) (99% - 100%)    General: NAD, laying in bed, arousable   HEENT: head NC/AT, no conjunctival injection, EOMI, dry MM   Neck: supple, no JVD  Cardio: RRR, +S1/S2, no M/R/G  Resp: b/l ronchi however mouth breathing, intermittent coughing   Abdo: soft, NT, ND, +bowel sounds x4  Extremities: WWP, no edema/cyanosis/clubbing   Vasc: 2+ radial and DP pulses b/l  Neuro: A&Ox0, responsive, 0/5 strength b/l (unchanged from baseline per HHA). will spontaneously move hands but not following commands when asked to squeeze.  Skin: dry, intact, no visible jaundice

## 2022-07-17 NOTE — H&P ADULT - PROBLEM SELECTOR PLAN 3
No hx of COPD or lung disease, not on home o2. Initially placed on 15 L NRB then transitioned to 5L NC sat 99%. No accessory muscle use or incr WOB. CXR with elevated L hemidiaphragm, possibly positional, no acute infiltrates. No hx of COPD or lung disease, not on home o2. Initially placed on 15 L NRB then transitioned to 5L NC sat 99%. No accessory muscle use or incr WOB. CXR with elevated L hemidiaphragm, possibly positional, no acute infiltrates.  - see management above No hx of COPD or lung disease, not on home o2. Initially placed on 15 L NRB then transitioned to 5L NC sat 99%. No accessory muscle use or incr WOB. CXR with elevated L hemidiaphragm, possibly positional, no acute infiltrates.  - weaned to 4L sat 92% with food waveform  - f/u D-dimer   - see management above No hx of COPD or lung disease, not on home o2. Initially placed on 15 L NRB then transitioned to 5L NC sat 99%. No accessory muscle use or incr WOB. CXR with elevated L hemidiaphragm, possibly positional, no acute infiltrates.  - weaned to 4L sat 92% with good waveform  - see management above

## 2022-07-17 NOTE — ED PROVIDER NOTE - OBJECTIVE STATEMENT
87 yo with ho of HTN, HLD, CVA, non verbal / cognitive impairment , bedbound, lives at home with help of home health aide Covid vaccinated,   patient with increased AMS, cough and decreased responsiveness for last two days.   DNR,

## 2022-07-17 NOTE — H&P ADULT - PROBLEM SELECTOR PLAN 6
Sunil Stone c/abdoul mISS Hx CVA. Home med ASA, plavix  - hold meds for now given AMS  - c/w asa 81mg & plavix after dysphagia screen Hx CVA. Home med ASA, plavix. non verbal bedbound at baseline  - hold meds for now given AMS  - c/w asa 81mg & plavix after dysphagia screen  - palliative consult placed

## 2022-07-17 NOTE — ED PROVIDER NOTE - CLINICAL SUMMARY MEDICAL DECISION MAKING FREE TEXT BOX
87 yo , DNR/ DNI confirmed with sister, w increasing lethargy and cough ,   found to have UTI, no sepsis, antibiotics given, x-ray w no infiltrate or CHF, , clear sputum suctioned from throat and pt's respiratory status improved,   now waking up , pt was unresponsive on arrival here, vitals remain stable,   plan admission to medicine team

## 2022-07-17 NOTE — H&P ADULT - PROBLEM SELECTOR PLAN 1
Pt s/p CVA, non verbal w/ cognitive impairment and bedbound. Per HHA has been more altered/lethargic from baseline likely 2/2 + UA. Pt s/p CVA, non verbal w/ cognitive impairment and bedbound. Per HHA has been more altered/lethargic from baseline likely 2/2 + UA. CXR with no acute infiltrates however given new hypoxia cannot r/o aspiration PNA   - c/w zosyns 3.375 q6hr   - f/u Ucx  - f/u blood cx  - f/u Ustrep, U legionella  - f/u RSV  - duoneb PRN   - monitor respiratory status, wean NC as tolerated Pt s/p CVA, non verbal w/ cognitive impairment and bedbound. Per HHA has been more altered/lethargic from baseline likely 2/2 + UA. CXR with no acute infiltrates however given new hypoxia cannot r/o aspiration PNA   - c/w zosyns 3.375 q6hr   - f/u Ucx  - f/u blood cx  - f/u Ustrep, U legionella  - f/u RVP  - duoneb PRN   - monitor respiratory status, wean NC as tolerated Pt s/p CVA, non verbal w/ cognitive impairment and bedbound. Per HHA has been more altered/lethargic from baseline likely 2/2 + UA. CXR with no acute infiltrates however given new hypoxia cannot r/o aspiration PNA   - c/w zosyns 3.375 q6hr   - f/u Ucx  - f/u blood cx  - f/u Ustrep, U legionella  - f/u RVP  - duoneb q6hr    - monitor respiratory status, wean NC as tolerated

## 2022-07-17 NOTE — H&P ADULT - ASSESSMENT
87 yo with ho of HTN, HLD, CVA, non verbal / cognitive impairment, bedbound, lives at home with help of home health aide brought in after being found altered found to have UTI.  89 yo with ho of HTN, HLD, CVA, non verbal / cognitive impairment, bedbound, lives at home with help of home health aide brought in after being found altered found to have UTI with new o2 requirements.

## 2022-07-17 NOTE — H&P ADULT - PROBLEM SELECTOR PLAN 5
Hx CVA Known hx of anemia (baseline 2021 9.8). Iron studies in 03/2021 showed Fe borderline low at 29; TIBC low at 184. No signs of active bleeding, no melena. Per allscripts, taking ferrous sulfate 01/2022 but per formal med rec with HHA no longer taking   - monitor H/H  - active T&S  - transfuse Hb <7.0

## 2022-07-18 DIAGNOSIS — G93.49 OTHER ENCEPHALOPATHY: ICD-10-CM

## 2022-07-18 DIAGNOSIS — Z51.5 ENCOUNTER FOR PALLIATIVE CARE: ICD-10-CM

## 2022-07-18 DIAGNOSIS — Z71.89 OTHER SPECIFIED COUNSELING: ICD-10-CM

## 2022-07-18 DIAGNOSIS — R13.10 DYSPHAGIA, UNSPECIFIED: ICD-10-CM

## 2022-07-18 DIAGNOSIS — R53.2 FUNCTIONAL QUADRIPLEGIA: ICD-10-CM

## 2022-07-18 LAB
-  COAGULASE NEGATIVE STAPHYLOCOCCUS: SIGNIFICANT CHANGE UP
A1C WITH ESTIMATED AVERAGE GLUCOSE RESULT: 4.8 % — SIGNIFICANT CHANGE UP (ref 4–5.6)
ALBUMIN SERPL ELPH-MCNC: 3.5 G/DL — SIGNIFICANT CHANGE UP (ref 3.3–5)
ALP SERPL-CCNC: 80 U/L — SIGNIFICANT CHANGE UP (ref 40–120)
ALT FLD-CCNC: 10 U/L — SIGNIFICANT CHANGE UP (ref 10–45)
ANION GAP SERPL CALC-SCNC: 8 MMOL/L — SIGNIFICANT CHANGE UP (ref 5–17)
AST SERPL-CCNC: 19 U/L — SIGNIFICANT CHANGE UP (ref 10–40)
BASOPHILS # BLD AUTO: 0.05 K/UL — SIGNIFICANT CHANGE UP (ref 0–0.2)
BASOPHILS NFR BLD AUTO: 0.7 % — SIGNIFICANT CHANGE UP (ref 0–2)
BILIRUB SERPL-MCNC: 0.4 MG/DL — SIGNIFICANT CHANGE UP (ref 0.2–1.2)
BLD GP AB SCN SERPL QL: NEGATIVE — SIGNIFICANT CHANGE UP
BUN SERPL-MCNC: 13 MG/DL — SIGNIFICANT CHANGE UP (ref 7–23)
CALCIUM SERPL-MCNC: 9.3 MG/DL — SIGNIFICANT CHANGE UP (ref 8.4–10.5)
CHLORIDE SERPL-SCNC: 102 MMOL/L — SIGNIFICANT CHANGE UP (ref 96–108)
CO2 SERPL-SCNC: 26 MMOL/L — SIGNIFICANT CHANGE UP (ref 22–31)
CREAT SERPL-MCNC: 0.51 MG/DL — SIGNIFICANT CHANGE UP (ref 0.5–1.3)
EGFR: 90 ML/MIN/1.73M2 — SIGNIFICANT CHANGE UP
EOSINOPHIL # BLD AUTO: 0.05 K/UL — SIGNIFICANT CHANGE UP (ref 0–0.5)
EOSINOPHIL NFR BLD AUTO: 0.7 % — SIGNIFICANT CHANGE UP (ref 0–6)
ESTIMATED AVERAGE GLUCOSE: 91 MG/DL — SIGNIFICANT CHANGE UP (ref 68–114)
GLUCOSE SERPL-MCNC: 123 MG/DL — HIGH (ref 70–99)
GRAM STN FLD: SIGNIFICANT CHANGE UP
HCT VFR BLD CALC: 27.2 % — LOW (ref 34.5–45)
HGB BLD-MCNC: 8.3 G/DL — LOW (ref 11.5–15.5)
IMM GRANULOCYTES NFR BLD AUTO: 0.6 % — SIGNIFICANT CHANGE UP (ref 0–1.5)
LYMPHOCYTES # BLD AUTO: 1.16 K/UL — SIGNIFICANT CHANGE UP (ref 1–3.3)
LYMPHOCYTES # BLD AUTO: 16.3 % — SIGNIFICANT CHANGE UP (ref 13–44)
MAGNESIUM SERPL-MCNC: 1.8 MG/DL — SIGNIFICANT CHANGE UP (ref 1.6–2.6)
MCHC RBC-ENTMCNC: 25.9 PG — LOW (ref 27–34)
MCHC RBC-ENTMCNC: 30.5 GM/DL — LOW (ref 32–36)
MCV RBC AUTO: 85 FL — SIGNIFICANT CHANGE UP (ref 80–100)
METHOD TYPE: SIGNIFICANT CHANGE UP
MONOCYTES # BLD AUTO: 0.57 K/UL — SIGNIFICANT CHANGE UP (ref 0–0.9)
MONOCYTES NFR BLD AUTO: 8 % — SIGNIFICANT CHANGE UP (ref 2–14)
NEUTROPHILS # BLD AUTO: 5.26 K/UL — SIGNIFICANT CHANGE UP (ref 1.8–7.4)
NEUTROPHILS NFR BLD AUTO: 73.7 % — SIGNIFICANT CHANGE UP (ref 43–77)
NRBC # BLD: 0 /100 WBCS — SIGNIFICANT CHANGE UP (ref 0–0)
PHOSPHATE SERPL-MCNC: 2.7 MG/DL — SIGNIFICANT CHANGE UP (ref 2.5–4.5)
PLATELET # BLD AUTO: 279 K/UL — SIGNIFICANT CHANGE UP (ref 150–400)
POTASSIUM SERPL-MCNC: 3.7 MMOL/L — SIGNIFICANT CHANGE UP (ref 3.5–5.3)
POTASSIUM SERPL-SCNC: 3.7 MMOL/L — SIGNIFICANT CHANGE UP (ref 3.5–5.3)
PROT SERPL-MCNC: 6.3 G/DL — SIGNIFICANT CHANGE UP (ref 6–8.3)
RBC # BLD: 3.2 M/UL — LOW (ref 3.8–5.2)
RBC # FLD: 14.6 % — HIGH (ref 10.3–14.5)
RH IG SCN BLD-IMP: NEGATIVE — SIGNIFICANT CHANGE UP
SODIUM SERPL-SCNC: 136 MMOL/L — SIGNIFICANT CHANGE UP (ref 135–145)
WBC # BLD: 7.13 K/UL — SIGNIFICANT CHANGE UP (ref 3.8–10.5)
WBC # FLD AUTO: 7.13 K/UL — SIGNIFICANT CHANGE UP (ref 3.8–10.5)

## 2022-07-18 PROCEDURE — 99223 1ST HOSP IP/OBS HIGH 75: CPT

## 2022-07-18 PROCEDURE — 99497 ADVNCD CARE PLAN 30 MIN: CPT

## 2022-07-18 PROCEDURE — 99233 SBSQ HOSP IP/OBS HIGH 50: CPT | Mod: GC

## 2022-07-18 PROCEDURE — 99255 IP/OBS CONSLTJ NEW/EST HI 80: CPT

## 2022-07-18 PROCEDURE — 99358 PROLONG SERVICE W/O CONTACT: CPT

## 2022-07-18 RX ORDER — CEFTRIAXONE 500 MG/1
1000 INJECTION, POWDER, FOR SOLUTION INTRAMUSCULAR; INTRAVENOUS EVERY 24 HOURS
Refills: 0 | Status: DISCONTINUED | OUTPATIENT
Start: 2022-07-18 | End: 2022-07-18

## 2022-07-18 RX ORDER — PIPERACILLIN AND TAZOBACTAM 4; .5 G/20ML; G/20ML
3.38 INJECTION, POWDER, LYOPHILIZED, FOR SOLUTION INTRAVENOUS EVERY 6 HOURS
Refills: 0 | Status: DISCONTINUED | OUTPATIENT
Start: 2022-07-18 | End: 2022-07-23

## 2022-07-18 RX ORDER — SODIUM CHLORIDE 9 MG/ML
1000 INJECTION, SOLUTION INTRAVENOUS
Refills: 0 | Status: DISCONTINUED | OUTPATIENT
Start: 2022-07-18 | End: 2022-07-18

## 2022-07-18 RX ORDER — SODIUM CHLORIDE 9 MG/ML
1000 INJECTION, SOLUTION INTRAVENOUS
Refills: 0 | Status: COMPLETED | OUTPATIENT
Start: 2022-07-18 | End: 2022-07-19

## 2022-07-18 RX ADMIN — CEFTRIAXONE 100 MILLIGRAM(S): 500 INJECTION, POWDER, FOR SOLUTION INTRAMUSCULAR; INTRAVENOUS at 12:52

## 2022-07-18 RX ADMIN — Medication 3 MILLILITER(S): at 22:21

## 2022-07-18 RX ADMIN — Medication 3 MILLILITER(S): at 04:35

## 2022-07-18 RX ADMIN — ENOXAPARIN SODIUM 40 MILLIGRAM(S): 100 INJECTION SUBCUTANEOUS at 17:31

## 2022-07-18 RX ADMIN — PIPERACILLIN AND TAZOBACTAM 200 GRAM(S): 4; .5 INJECTION, POWDER, LYOPHILIZED, FOR SOLUTION INTRAVENOUS at 06:01

## 2022-07-18 RX ADMIN — Medication 3 MILLILITER(S): at 10:53

## 2022-07-18 RX ADMIN — SODIUM CHLORIDE 80 MILLILITER(S): 9 INJECTION, SOLUTION INTRAVENOUS at 19:36

## 2022-07-18 RX ADMIN — PIPERACILLIN AND TAZOBACTAM 200 GRAM(S): 4; .5 INJECTION, POWDER, LYOPHILIZED, FOR SOLUTION INTRAVENOUS at 00:31

## 2022-07-18 RX ADMIN — PIPERACILLIN AND TAZOBACTAM 200 GRAM(S): 4; .5 INJECTION, POWDER, LYOPHILIZED, FOR SOLUTION INTRAVENOUS at 19:35

## 2022-07-18 RX ADMIN — Medication 3 MILLILITER(S): at 17:31

## 2022-07-18 RX ADMIN — SODIUM CHLORIDE 80 MILLILITER(S): 9 INJECTION, SOLUTION INTRAVENOUS at 10:53

## 2022-07-18 NOTE — DIETITIAN INITIAL EVALUATION ADULT - OTHER CALCULATIONS
%IBW: 96; ABW used to calculate estimated needs d/t pt being between 80 and 120% IBW. Adjusted for advanced age and PCM, prevention of skin breakdown. Please aim for upper end of protein needs. Fluids per team.

## 2022-07-18 NOTE — PROGRESS NOTE ADULT - PROBLEM SELECTOR PLAN 8
Hx DM, no currently on any medication.   - f/u a1c  - NPO   - mISS when tolerating PO Hx HTN. home amlodipine 10mg, ramipril 20mg qd   - hold for now given AMS    #HLD: lipitor 40  - hold for now given AMS Hx DM, no currently on any medication. A1C 4.8 (7/17/2022).  - NPO   - SSI when tolerating PO

## 2022-07-18 NOTE — DIETITIAN INITIAL EVALUATION ADULT - PERTINENT LABORATORY DATA
07-18    136  |  102  |  13  ----------------------------<  123<H>  3.7   |  26  |  0.51    Ca    9.3      18 Jul 2022 07:41  Phos  2.7     07-18  Mg     1.8     07-18    TPro  6.3  /  Alb  3.5  /  TBili  0.4  /  DBili  x   /  AST  19  /  ALT  10  /  AlkPhos  80  07-18  A1C with Estimated Average Glucose Result: 4.8 % (07-18-22 @ 07:41)

## 2022-07-18 NOTE — CONSULT NOTE ADULT - PROBLEM SELECTOR RECOMMENDATION 6
.  - Reported PoA is pts 90 yo sister Talita Thomas #[phone]  - Incomplete DNR (only) MOLST in Patient Window Tab   - Discussed medical updates, explored GOC and ACP.   - Sister defers updating MOLST to DNR DNI, No Peg and further discussion (of GOC, home hospice) until she speaks with primary team.    In addition to the E/M visit, an advance care planning meeting was performed. Start time: 315; End time: 345; Total time: 30 min. A TC meeting to discuss advance care planning was held today regarding: ADRIANNE OMALLEY. Primary decision maker: Patient is unable to participate in decision making; Alternate/surrogate: sisterTalita  Discussed advance directives including, but not limited to, healthcare proxy and code status. Decision regarding code status: DNR/DNI, No Peg Documentation completed today:  GOC note

## 2022-07-18 NOTE — DIETITIAN NUTRITION RISK NOTIFICATION - TREATMENT: THE FOLLOWING DIET HAS BEEN RECOMMENDED
Diet, NPO (07-17-22 @ 16:25) [Active]  **Please align nutrition interventions with GOC at all times. Please see RD note from 7/18 for full recs/EN recs if warranted.

## 2022-07-18 NOTE — PROGRESS NOTE ADULT - PROBLEM SELECTOR PLAN 5
Known hx of anemia (baseline 2021 9.8). Iron studies in 03/2021 showed Fe borderline low at 29; TIBC low at 184. No signs of active bleeding, no melena. Per allscripts, taking ferrous sulfate 01/2022 but per formal med rec with HHA no longer taking   - monitor H/H  - active T&S  - transfuse Hb <7.0 Hx CVA. Home med ASA, plavix. non verbal bedbound at baseline  - hold meds for now given AMS  - c/w asa 81mg & plavix after dysphagia screen  - palliative consult placed

## 2022-07-18 NOTE — DIETITIAN INITIAL EVALUATION ADULT - PERTINENT MEDS FT
MEDICATIONS  (STANDING):  albuterol/ipratropium for Nebulization 3 milliLiter(s) Nebulizer every 6 hours  cefTRIAXone   IVPB 1000 milliGRAM(s) IV Intermittent every 24 hours  dextrose 5% + lactated ringers. 1000 milliLiter(s) (80 mL/Hr) IV Continuous <Continuous>  enoxaparin Injectable 40 milliGRAM(s) SubCutaneous every 24 hours  sodium chloride 0.9%. 1000 milliLiter(s) (80 mL/Hr) IV Continuous <Continuous>    MEDICATIONS  (PRN):

## 2022-07-18 NOTE — CONSULT NOTE ADULT - PROBLEM SELECTOR RECOMMENDATION 5
.  functional and cognitive baselines prior to admission: non verbal, total care, bed bound, full feed. HHA endorses weight loss in weeks prior to admission.  - hospice eligible

## 2022-07-18 NOTE — CONSULT NOTE ADULT - ASSESSMENT
88F with multiple CVA,, HTN HLD presented 7/17 from home with altered mental status and failure to thrive. +Uti. Palliative care consulted for GOC.     ·	Reported PoA is pts 90 yo sister Talita Thomas #[phone]  ·	Incomplete DNR (only) MOLST in Patient Window Tab   ·	Discussed medical updates, explored GOC and ACP. Sister defers updating MOLST to DNR DNI, No Peg and further discussion (of GOC, home hospice) until she speaks with primary team.   ·	If pt without adequate improvement of mentation and subsequent PO intake, pt likely in the last weeks of life and would meet home hospice criteria   ·	Dispo home, resume 24/7 HHA, +/- hospice pending go

## 2022-07-18 NOTE — DIETITIAN INITIAL EVALUATION ADULT - NS FNS CHANGE IN WEIGHT
From: Eric Bernal  To: Julio Peña Loss

## 2022-07-18 NOTE — CONSULT NOTE ADULT - NS ATTEST RISK PROBLEM GEN_ALL_CORE FT
Acute Illness That Poses A Threat To Life  Abrupt Change In Neurological Status  Chronic Illness With Severe Exacerbation/Progression  Decision Made To Not Resuscitate (DNR)

## 2022-07-18 NOTE — PROGRESS NOTE ADULT - PROBLEM SELECTOR PLAN 4
Presenting with troponin 0.02, unable to assess if patient is having chest pain. EKG with 1 ST elevation in V2, and 1 ST elevation in V3 however not continuous  - other cardiac enzymes WNL: , CKMB 1.9   - repeat trop 6pm  - if up trending, repeat EKG Known hx of anemia (baseline 2021 9.8). Iron studies in 03/2021 showed Fe borderline low at 29; TIBC low at 184. No signs of active bleeding, no melena. Per allscripts, taking ferrous sulfate 01/2022 but per formal med rec with HHA no longer taking   - monitor H/H  - active T&S  - transfuse Hb <7.0 Presenting with troponin 0.02, unable to assess if patient is having chest pain. EKG x2 normal, no ST elevations. Repeat troponin 0.02.  - other cardiac enzymes WNL: , CKMB 1.9   - if up trending, repeat EKG

## 2022-07-18 NOTE — CONSULT NOTE ADULT - PROBLEM SELECTOR RECOMMENDATION 2
.  per chart review and HHA, pt w hx of asp with PO in weeks prior to admission.   - SS: Pt with no attempt at bolus formation or manipulation, mouth remained open. NPO pending further GOC   - discussed long term feeding GOC as above, sister stating that PEG would not be w/in GOC given risks, complications  - recommend allowing food for comfort w texture recs per SS if pt is awake, willing to eat

## 2022-07-18 NOTE — CONSULT NOTE ADULT - CONSULT REASON
advanced illness trigger 89F multiple CVA now mostly nonverbal, bedbound, coming in with AMS 2/2 UTI +/- aspiration PNA. Unable to tolerate PO. Newly hypoxic. DNR but NOT DNI. Sister is HCP

## 2022-07-18 NOTE — PROGRESS NOTE ADULT - PROBLEM SELECTOR PLAN 6
Hx CVA. Home med ASA, plavix. non verbal bedbound at baseline  - hold meds for now given AMS  - c/w asa 81mg & plavix after dysphagia screen  - palliative consult placed Hx MDD on bupropion 150mg XL  - hold po meds given AMS  - resume when mental status improves Hx CVA. Home med ASA, plavix. non verbal bedbound at baseline  - hold meds for now given AMS  - c/w asa 81mg & plavix after dysphagia screen  - palliative consult

## 2022-07-18 NOTE — GOALS OF CARE CONVERSATION - ADVANCED CARE PLANNING - CONVERSATION DETAILS
Spoke with patient's sister Talita Haney on 7/18.  Per discussion with sister regarding what the patient's wishes would be, she conveys that patient's wishes would be consistent with DNR/DNI.  Ms. Haney also indicated that Ms. Garzon would not want a feeding tube at this time.

## 2022-07-18 NOTE — PROGRESS NOTE ADULT - PROBLEM SELECTOR PLAN 10
F: NC 80cc/hr  E: replete to K>4, Mg>2, Phos>2.5  N: NPO pending mental status improvement/dysphagia screen  Ppx: lovenox    Code Status: DNR - MOLST in chart     Dispo: [facility]

## 2022-07-18 NOTE — SWALLOW BEDSIDE ASSESSMENT ADULT - MODE OF PRESENTATION
spoon Alert-The patient is alert, awake and responds to voice. The patient is oriented to time, place, and person. The triage nurse is able to obtain subjective information.

## 2022-07-18 NOTE — DIETITIAN INITIAL EVALUATION ADULT - ENTERAL
Should goals of care warrant enteral nutrition support, recommend EN via NGT: Jevity 1.2 @53 mL/hr x 24 hours. This provides: 1272mL TV,  1526kcal,  71g protein, 1027mL free water. Meetinkcal/kg, 1.4g/kg protein based on ABW  51.2kg. Defer fluids to team. Maintain aspiration precautions. Monitor s/s of intolerance; adjust EN as needed.

## 2022-07-18 NOTE — PROGRESS NOTE ADULT - PROBLEM SELECTOR PLAN 2
Urine + LE and nitrite. Unable to assess if patient symptomatic however more altered/lethargic from baseline. No leukocytosis, negative lactate, not meeting SIRS criteria.   - c/w zosyns 3.375 q6hr  - f/u Ucx No hx of COPD or lung disease, not on home o2. Initially placed on 15 L NRB then transitioned to 5L NC sat 99%. No accessory muscle use or incr WOB. CXR with elevated L hemidiaphragm, possibly positional, no acute infiltrates.  - weaned to 4L sat 92% with good waveform  - see management above No hx of COPD or lung disease, not on home O2. Initially placed on 15 L NRB then transitioned to 5L NC sat 99%. No accessory muscle use or incr WOB. CXR with elevated L hemidiaphragm, possibly positional, no acute infiltrates. CXR with no acute infiltrates however given new hypoxia and AMS, must consider aspiration PNA.   - duoneb q6hr    - continue to wean O2, currently satting well 5L NC  - see management above

## 2022-07-18 NOTE — DIETITIAN INITIAL EVALUATION ADULT - ADD RECOMMEND
1. Continue with NPO diet order. Should GOC align with enteral nutrition, recommend:  >>initiate Jevity 1.2@ 20mL/hr, increasing by 10 q8-10hrs until goal rate of 53mL is reached. Goal rate provides 1272mL TV, 1526kcal, 71g pro, 1027mL free water. Additional fluids per team.   >>Monitor s/sx aspiration and tolerance   >>Maintain aspiration precautions at all time  2. Align nutrition interventions with GOC at all times   3. Diet advancement per SLP  4. Pain/BM regimen per team  5. Recommend daily MVI   6. RD will follow up per protocol.  1. Continue with NPO diet order. Should GOC align with enteral nutrition, recommend:  >>initiate Jevity 1.2@ 20mL/hr, increasing by 10 q8-10hrs until goal rate of 53mL is reached. Goal rate provides 1272mL TV, 1526kcal, 71g pro, 1027mL free water. Additional fluids per team.   >>Monitor s/sx aspiration and tolerance   >>Maintain aspiration precautions at all time  2. Align nutrition interventions with GOC at all times   3. Diet advancement per SLP  4. Pain/BM regimen per team  5. Aggressively monitor lytes replete prn.   >>Note pt at risk for refeeding syndrome in the setting of severe malnutrition.  6. Recommend daily MVI   7. RD will follow up per protocol.

## 2022-07-18 NOTE — PROGRESS NOTE ADULT - PROBLEM SELECTOR PLAN 9
Hx HTN. home amlodipine 10mg, ramipril 20mg qd   - hold for now given AMS    #HLD: lipitor 40  - hold for now given AMS F: NC 80cc/hr  E: replete to K>4, Mg>2, Phos>2.5  N: NPO pending mental status improvement/dysphagia screen  Ppx: lovenox    Code Status: DNR - MOLST in chart     Dispo: [facility]

## 2022-07-18 NOTE — DIETITIAN INITIAL EVALUATION ADULT - OTHER INFO
89 yo with ho of HTN, HLD, CVA, non verbal / cognitive impairment, bedbound, lives at home with help of home health aide brought in after being found altered found to have UTI.     Pt seen at bedside for initial assessment.  Assessment limited due to pt's altered mental status. Information obtained mostly from patient's home health aid. No s/sx nausea/vomiting at this time. Reported last bowel movement 7/17.  NKFA as per EMR. HHA denies change in appetite PTA; endorses pt eats 3 meals/day at home. However, pt supposedly has decreased PO intake over the last week or so. Home health aid believes there has been recent weight loss but is unsure of amount. As per [facility] records, pt weighed 144lbs in March 2021, reflecting a 22% wt loss in >12mos. Not significant but concerning. No pressure ulcers documented at this time. No edema documented at this time. Angel score=10. Nutrition focused physical exam significant for severe clavicle, orbital, shoulder and temporal wasting. Based on ASPEN guidelines, pt meets criteria for severe malnutrition at this time. Discussed current diet order of NPO. Pt was visited by SLP 7/18 for bedside swallow evaluation, noting pt is not a candidate for oral diet  Made aware RD remains available. RD follow up per protocol. Please see nutrition recommendations below.

## 2022-07-18 NOTE — CONSULT NOTE ADULT - PROBLEM SELECTOR RECOMMENDATION 9
.  prior to admission, pps 40% bedbound, total care, full feed. current pps 30%.   - cw supportive care, reposition q2h, skin care   - dispo: resume 24/7 HHA

## 2022-07-18 NOTE — CONSULT NOTE ADULT - PROBLEM SELECTOR RECOMMENDATION 7
.  If pt without adequate improvement of mentation and subsequent PO intake, pt likely in the last weeks of life and would meet home hospice criteria. Dispo home, resume 24/7 HHA, +/- hospice pending further discussion of GOC.     Active Psychosocial Referrals:   SW/MARIA TERESA[  x] PT/OT[  ] Chaplaincy[   ]Hospice[  ]  Ethics[  ]  Dian Marcus Patient/Family Support[  ] Holistic RN[  ] Massage Therapy[  ]    Coping:  well[  ] with difficulty[  ] poor coping[  ] unable to assess[ x ]   Support system: strong[  ] adequate[x  ] inadequate[  ]    - All questions answered, emotional support provided to Talita franco   - dw primary team, Dr. Sequeira   - For new or uncontrolled symptoms, please call Palliative Care at [phone]. The service is available 24/7 (including nights & weekends) to provide symptom management recommendations over the phone as appropriate  - Will continue to follow with you

## 2022-07-18 NOTE — PROGRESS NOTE ADULT - SUBJECTIVE AND OBJECTIVE BOX
OVERNIGHT EVENTS:     SUBJECTIVE / INTERVAL HPI: Patient seen and examined at bedside.     VITAL SIGNS:  Vital Signs Last 24 Hrs  T(C): 36.6 (2022 05:19), Max: 37.6 (2022 12:26)  T(F): 97.9 (2022 05:19), Max: 99.6 (2022 12:26)  HR: 74 (2022 05:19) (57 - 74)  BP: 169/66 (2022 05:19) (118/56 - 169/66)  BP(mean): --  RR: 17 (2022 05:19) (16 - 20)  SpO2: 100% (:) (97% - 100%)    Parameters below as of 2022 05:19  Patient On (Oxygen Delivery Method): nasal cannula  O2 Flow (L/min): 5      PHYSICAL EXAM:    General: NAD  HEENT: NC/AT; PERRL, anicteric sclera; MMM  Neck: supple  Cardiovascular: +S1/S2; RRR  Respiratory: CTA B/L; no W/R/R  Gastrointestinal: soft, NT/ND; +BSx4  Extremities: WWP; no edema, clubbing or cyanosis  Vascular: 2+ radial, DP/PT pulses B/L  Neurological: AAOx3; no focal deficits    MEDICATIONS:  MEDICATIONS  (STANDING):  albuterol/ipratropium for Nebulization 3 milliLiter(s) Nebulizer every 6 hours  enoxaparin Injectable 40 milliGRAM(s) SubCutaneous every 24 hours  piperacillin/tazobactam IVPB.. 2.25 Gram(s) IV Intermittent every 6 hours  sodium chloride 0.9%. 1000 milliLiter(s) (80 mL/Hr) IV Continuous <Continuous>    MEDICATIONS  (PRN):      ALLERGIES:  Allergies    morphine (Other)    Intolerances        LABS:                        9.7    9.50  )-----------( 341      ( 2022 12:21 )             31.2     07-17    137  |  101  |  15  ----------------------------<  123<H>  4.4   |  26  |  0.68    Ca    9.8      2022 12:21  Mg     2.0     07-17    TPro  7.2  /  Alb  4.0  /  TBili  0.4  /  DBili  x   /  AST  22  /  ALT  14  /  AlkPhos  93  -    PT/INR - ( 2022 12:21 )   PT: 11.7 sec;   INR: 0.98          PTT - ( 2022 12:21 )  PTT:28.3 sec  Urinalysis Basic - ( 2022 12:38 )    Color: Yellow / Appearance: Turbid / S.015 / pH: x  Gluc: x / Ketone: NEGATIVE  / Bili: Small / Urobili: 0.2 E.U./dL   Blood: x / Protein: NEGATIVE mg/dL / Nitrite: POSITIVE   Leuk Esterase: Large / RBC: < 5 /HPF / WBC Many /HPF   Sq Epi: x / Non Sq Epi: 0-5 /HPF / Bacteria: Present /HPF      CAPILLARY BLOOD GLUCOSE          RADIOLOGY & ADDITIONAL TESTS: Reviewed.   SUBJECTIVE / INTERVAL HPI: No overnight events. Patient seen and examined at bedside. Pt unresponsive, not able to follow commands. Unable to perform ROS. Vitals stable.    VITAL SIGNS:  Vital Signs Last 24 Hrs  T(C): 36.6 (2022 05:19), Max: 37.6 (2022 12:26)  T(F): 97.9 (2022 05:19), Max: 99.6 (2022 12:26)  HR: 74 (2022 05:19) (57 - 74)  BP: 169/66 (2022 05:19) (118/56 - 169/66)  BP(mean): --  RR: 17 (2022 05:19) (16 - 20)  SpO2: 100% (:19) (97% - 100%)    Parameters below as of 2022 05:19  Patient On (Oxygen Delivery Method): nasal cannula  O2 Flow (L/min): 5      PHYSICAL EXAM:    General: thin, no acute distress  HEENT: NC/AT, anicteric sclera, dry MMM, sunken eyes, neck rigid  Cardiovascular: regular rate rhythm, holosystolic murmur most prominent in aortic region radiating to neck, normal S1, muted S2  Respiratory: mouth breathing, ancillary sounds on auscultation, no wheezes or crackles BL  Gastrointestinal: soft, NT/ND; +BSx4  Extremities: WWP; no edema  Vascular: 2+ radial, DP pulses bilaterally  Neurological: AAOx0; non verbal; intermittently follows commands (baseline, per HHA at bedside)    MEDICATIONS:  MEDICATIONS  (STANDING):  albuterol/ipratropium for Nebulization 3 milliLiter(s) Nebulizer every 6 hours  enoxaparin Injectable 40 milliGRAM(s) SubCutaneous every 24 hours  piperacillin/tazobactam IVPB.. 2.25 Gram(s) IV Intermittent every 6 hours  sodium chloride 0.9%. 1000 milliLiter(s) (80 mL/Hr) IV Continuous <Continuous>    MEDICATIONS  (PRN):      ALLERGIES:  Allergies    morphine (Other)    Intolerances        LABS:                        9.7    9.50  )-----------( 341      ( 2022 12:21 )             31.2     07-17    137  |  101  |  15  ----------------------------<  123<H>  4.4   |  26  |  0.68    Ca    9.8      2022 12:21  Mg     2.0         TPro  7.2  /  Alb  4.0  /  TBili  0.4  /  DBili  x   /  AST  22  /  ALT  14  /  AlkPhos  93  07-    PT/INR - ( 2022 12:21 )   PT: 11.7 sec;   INR: 0.98          PTT - ( 2022 12:21 )  PTT:28.3 sec  Urinalysis Basic - ( 2022 12:38 )    Color: Yellow / Appearance: Turbid / S.015 / pH: x  Gluc: x / Ketone: NEGATIVE  / Bili: Small / Urobili: 0.2 E.U./dL   Blood: x / Protein: NEGATIVE mg/dL / Nitrite: POSITIVE   Leuk Esterase: Large / RBC: < 5 /HPF / WBC Many /HPF   Sq Epi: x / Non Sq Epi: 0-5 /HPF / Bacteria: Present /HPF      CAPILLARY BLOOD GLUCOSE          RADIOLOGY & ADDITIONAL TESTS: Reviewed.   SUBJECTIVE / INTERVAL HPI: No overnight events. Patient seen and examined at bedside. Pt unresponsive, not able to follow commands. Unable to perform ROS. Vitals stable.    VITAL SIGNS:  Vital Signs Last 24 Hrs  T(C): 36.6 (2022 05:19), Max: 37.6 (2022 12:26)  T(F): 97.9 (2022 05:19), Max: 99.6 (2022 12:26)  HR: 74 (2022 05:19) (57 - 74)  BP: 169/66 (2022 05:19) (118/56 - 169/66)  BP(mean): --  RR: 17 (2022 05:19) (16 - 20)  SpO2: 100% (:19) (97% - 100%)    Parameters below as of 2022 05:19  Patient On (Oxygen Delivery Method): nasal cannula  O2 Flow (L/min): 5      PHYSICAL EXAM:    General: thin, no acute distress  HEENT: NC/AT, anicteric sclera, dry MMM, sunken eyes, neck rigid  Cardiovascular: regular rate rhythm, holosystolic murmur most prominent in aortic region radiating to neck, normal S1, muted S2  Respiratory: mouth breathing, ancillary sounds on auscultation, no wheezes or crackles BL  Gastrointestinal: soft, NT/ND; +BSx4  Extremities: WWP; no edema  Vascular: 2+ radial, DP pulses bilaterally  Neurological: AAOx0; non verbal; does not follow commands (baseline, per HHA at bedside)    MEDICATIONS:  MEDICATIONS  (STANDING):  albuterol/ipratropium for Nebulization 3 milliLiter(s) Nebulizer every 6 hours  enoxaparin Injectable 40 milliGRAM(s) SubCutaneous every 24 hours  piperacillin/tazobactam IVPB.. 2.25 Gram(s) IV Intermittent every 6 hours  sodium chloride 0.9%. 1000 milliLiter(s) (80 mL/Hr) IV Continuous <Continuous>    MEDICATIONS  (PRN):      ALLERGIES:  Allergies    morphine (Other)    Intolerances        LABS:                        9.7    9.50  )-----------( 341      ( 2022 12:21 )             31.2     07-17    137  |  101  |  15  ----------------------------<  123<H>  4.4   |  26  |  0.68    Ca    9.8      2022 12:21  Mg     2.0         TPro  7.2  /  Alb  4.0  /  TBili  0.4  /  DBili  x   /  AST  22  /  ALT  14  /  AlkPhos  93  07-    PT/INR - ( 2022 12:21 )   PT: 11.7 sec;   INR: 0.98          PTT - ( 2022 12:21 )  PTT:28.3 sec  Urinalysis Basic - ( 2022 12:38 )    Color: Yellow / Appearance: Turbid / S.015 / pH: x  Gluc: x / Ketone: NEGATIVE  / Bili: Small / Urobili: 0.2 E.U./dL   Blood: x / Protein: NEGATIVE mg/dL / Nitrite: POSITIVE   Leuk Esterase: Large / RBC: < 5 /HPF / WBC Many /HPF   Sq Epi: x / Non Sq Epi: 0-5 /HPF / Bacteria: Present /HPF      CAPILLARY BLOOD GLUCOSE          RADIOLOGY & ADDITIONAL TESTS: Reviewed.

## 2022-07-18 NOTE — CONSULT NOTE ADULT - PROBLEM SELECTOR RECOMMENDATION 3
.  hx CVA. cb toxic metabolic encephalopathy 2/2 UTI. currently somnolent and unable to participate in ROS.   - Risk for delirium, reorient patient and reinforce pts safety   - Avoid benzos, sedating and anticholinergic medications   - GOC ongoing

## 2022-07-18 NOTE — PROGRESS NOTE ADULT - PROBLEM SELECTOR PLAN 1
Pt s/p CVA, non verbal w/ cognitive impairment and bedbound. Per HHA has been more altered/lethargic from baseline likely 2/2 + UA. CXR with no acute infiltrates however given new hypoxia cannot r/o aspiration PNA   - c/w zosyns 3.375 q6hr   - f/u Ucx  - f/u blood cx  - f/u Ustrep, U legionella  - f/u RVP  - duoneb q6hr    - monitor respiratory status, wean NC as tolerated Pt s/p CVA, non verbal w/ cognitive impairment and bedbound. Per HHA has been more altered/lethargic from baseline likely 2/2 UTI. UA with positive nitrites UA. CXR with no acute infiltrates however given new hypoxia cannot r/o aspiration PNA   - c/w zosyns 3.375 q6hr   - f/u Ucx  - f/u blood cx  - f/u Ustrep, U legionella  - f/u RVP  - duoneb q6hr    - monitor respiratory status, wean NC as tolerated    Urine + LE and nitrite. Unable to assess if patient symptomatic however more altered/lethargic from baseline. No leukocytosis, negative lactate, not meeting SIRS criteria.   - c/w zosyns 3.375 q6hr  - f/u Ucx Pt s/p CVA, non verbal w/ cognitive impairment and bedbound. Per HHA has been more altered/lethargic from baseline likely 2/2 UTI. UA with pyuria, bacteruria, LE, nitrites. Unable to assess if patient symptomatic however more altered/lethargic from baseline. No leukocytosis, negative lactate, not meeting SIRS criteria.   - d/c zosyn, start CTX 1g IV (3 day course, start date 7/18)  - f/u urine cx  - f/u blood cx  - Ustrep neg, U legionella pending  - RVP neg    - monitor respiratory status, wean NC as tolerated    Urine + LE and nitrite.    - c/w zosyns 3.375 q6hr  - f/u Ucx

## 2022-07-18 NOTE — DIETITIAN INITIAL EVALUATION ADULT - NUTRITIONGOAL OUTCOME1
Pt to consistently meet >75% of EER during hospital stay via feasible route and show no further signs symptoms of malnutrition, so long as feeding route aligns with goals of care.

## 2022-07-18 NOTE — PROGRESS NOTE ADULT - ASSESSMENT
87 yo with ho of HTN, HLD, CVA, non verbal / cognitive impairment, bedbound, lives at home with help of home health aide brought in after being found altered found to have UTI with new o2 requirements.   87 yo with ho of HTN, HLD, CVA, non verbal / cognitive impairment, bedbound, lives at home with help of home health aide brought in after being found altered and decreased PO intake, found to have UTI and new O2 requirements.

## 2022-07-18 NOTE — PROGRESS NOTE ADULT - PROBLEM SELECTOR PLAN 7
Hx MDD on bupropion 150mg XL  - hold po meds given AMS  - resume when mental status improves Hx DM, no currently on any medication.   - f/u a1c  - NPO   - mISS when tolerating PO Hx DM, no currently on any medication. A1C 4.8 (7/17/2022).  - NPO   - SSI when tolerating PO

## 2022-07-18 NOTE — PROGRESS NOTE ADULT - PROBLEM SELECTOR PLAN 3
No hx of COPD or lung disease, not on home o2. Initially placed on 15 L NRB then transitioned to 5L NC sat 99%. No accessory muscle use or incr WOB. CXR with elevated L hemidiaphragm, possibly positional, no acute infiltrates.  - weaned to 4L sat 92% with good waveform  - see management above Presenting with troponin 0.02, unable to assess if patient is having chest pain. EKG with 1 ST elevation in V2, and 1 ST elevation in V3 however not continuous  - other cardiac enzymes WNL: , CKMB 1.9   - repeat trop 6pm  - if up trending, repeat EKG Presenting with troponin 0.02, unable to assess if patient is having chest pain. EKG x2 normal, no ST elevations. Repeat troponin 0.02.  - other cardiac enzymes WNL: , CKMB 1.9   - if up trending, repeat EKG Pt with decreased PO intake per HHA. On speech and swallow evaluation, pt not able to swallow. Pt currently NPO.  - NPO  - no tube feeds for now, per sister (health care proxy)  - D5 LR 80mL/hr  - nutrition consult

## 2022-07-18 NOTE — CONSULT NOTE ADULT - SUBJECTIVE AND OBJECTIVE BOX
HPI:  89 yo with ho of HTN, HLD, CVA, non verbal / cognitive impairment, bedbound, lives at home with help of home health aide brought in after being found altered by HHA. Per HHA and family friend, patient is minimally verbal at baseline, can answer yes/no questions and sometimes string 2-3 words together. Since thursday, noticed patient was congestion and coughing, with decr PO intake and alertness. Notified outpatient PCP Dr. Madison Winters and was given robitussin. No obvious episodes of choking/aspirating on food. Normally eats regular gluten free diet, but since thursday only would eat food pureed by . No fever, chills, SOB, melena, hematuria per HHA. Brought pt to ED because home pulse ox showed o2 at 85%. Attempted to obtain ROS from patient but would not participate. Per family friend, patient sometimes chooses not to respond.     ED course:  Vitals: 99.6F, HR 65, /56, 15L NRB --> 99% on 5L NC  Labs: wbc 9.5, Hb 9.7, BUN 15/Cr 0.68, lactate 1.2 , trop 0.02, VBG, 7.44 pco2 42, po2 56, UA+ LE & nitrite   Imaging: CXR L elevated hemidiaphram, no acute infiltrates, EKG WNL  Interventions: vanc& zosyn, 250cc NS --> NS 120cc/hr  (2022 13:45)      PRESENT SYMPTOMS:   [ ]Unable to obtain due to poor mentation   Source if other than patient:  [ ]Family   [ ]Team     Pain [  ]  Location :        Quality:  Radiation:  Timing:  Aggravating factors:  Minimal acceptable level (0-10 scale):   Severity in last 24h (0-10 scale) :  Current score (0-10 scale):  Improves with:     PAIN AD Score:   http://geriatrictoolkit.[place].edu/cog/painad.pdf (press ctrl +  left click to view)    If [  ], pt denies symptom.   Dyspnea:         [  ]  Anxiety:           [  ]  Difficulty sleeping: [  ]  Fatigue:           [  ]  Nausea:           [  ]  Loss of appetite:     [  ]  Dysphagia: [  ]  Constipation:   [  ]        LBM   Grief Present   [  ] Yes   [  ] No   Other Symptoms:    All other review of systems negative [  ]    Opiate Naive (Y/N):   iStop reviewed (Y/N): Yes.   No Rx found on iStop review (Ref#:           PAST MEDICAL & SURGICAL HISTORY:  Diabetes      Cerebrovascular accident (CVA)      HLD (hyperlipidemia)      HTN (hypertension)      DM (diabetes mellitus)      HLD (hyperlipidemia)      CVA (cerebral vascular accident)      No significant past surgical history          FAMILY HISTORY:   Reviewed and found non contributory in mother or father    Items that are not checked are not present  PSYCHOSOCIAL ASSESSMENT:  - Significant other/partner: [  ]  Children: [  ]  - Living Situation: Home [  ] Long term care [  ]  Rehab[  ]  Other[  ]  - Support system: strong[  ] adequate[  ] inadequate[  ]  - Shinto/Spiritual practice:  - Role of organized Congregational important [  ] some [  ] unable to assess dt pt mentation [  ]  - Coping: well[  ]  with difficulty[  ]  poor coping[  ]  unable to assess dt pt mentation [  ]  - What is most important to patient?  - What worries patient the most?  - Is patient at peace? :     ADVANCE DIRECTIVES:    - MOLST[  ]    Living Will [  ]   DECISION MAKER(s):  - Health Care Proxy(s) [  ]  Surrogate(s)[  ] Guardian[  ]           Name(s)/Phone Number(s):     BASELINE (I)ADLs (prior to admission):  - [place]:  Total[  ] Moderate[  ] Dependent[  ]    Allergies    morphine (Other)    Intolerances        Medications:      MEDICATIONS  (STANDING):  albuterol/ipratropium for Nebulization 3 milliLiter(s) Nebulizer every 6 hours  cefTRIAXone   IVPB 1000 milliGRAM(s) IV Intermittent every 24 hours  dextrose 5% + lactated ringers. 1000 milliLiter(s) (80 mL/Hr) IV Continuous <Continuous>  enoxaparin Injectable 40 milliGRAM(s) SubCutaneous every 24 hours  sodium chloride 0.9%. 1000 milliLiter(s) (80 mL/Hr) IV Continuous <Continuous>    MEDICATIONS  (PRN):      Labs:    CBC:                        8.3    7.13  )-----------( 279      ( 2022 07:41 )             27.2     CMP:        136  |  102  |  13  ----------------------------<  123<H>  3.7   |  26  |  0.51    Ca    9.3      2022 07:41  Phos  2.7       Mg     1.8         TPro  6.3  /  Alb  3.5  /  TBili  0.4  /  DBili  x   /  AST  19  /  ALT  10  /  AlkPhos  80  -    PT/INR - ( 2022 12:21 )   PT: 11.7 sec;   INR: 0.98          PTT - ( 2022 12:21 )  PTT:28.3 sec   Urinalysis Basic - ( 2022 12:38 )    Color: Yellow / Appearance: Turbid / S.015 / pH: x  Gluc: x / Ketone: NEGATIVE  / Bili: Small / Urobili: 0.2 E.U./dL   Blood: x / Protein: NEGATIVE mg/dL / Nitrite: POSITIVE   Leuk Esterase: Large / RBC: < 5 /HPF / WBC Many /HPF   Sq Epi: x / Non Sq Epi: 0-5 /HPF / Bacteria: Present /HPF        RADIOLOGY & ADDITIONAL STUDIES:  Imaging:  Reviewed    PROTEIN CALORIE MALNUTRITION PRESENT:  [ ] Yes  [ ] No  Albumin, Serum:     [ ] PPSV2 < or = to 30%   [ ] significant weight loss    [ ] poor nutritional intake  [ ] catabolic state   [ ] anasarca       [ ] Artificial Nutrition    PEx:  T(C): 36.4 (22 @ 12:10), Max: 36.6 (22 @ 15:30)  HR: 61 (22 @ 12:10) (61 - 74)  BP: 170/71 (22 @ 12:10) (132/63 - 170/71)  RR: 17 (22 @ 12:10) (17 - 18)  SpO2: 97% (22 @ 12:10) (97% - 100%)  Wt(kg): --    ECOG Performance:       Current Palliative Performance Scale/Karnofsky Score:    %  Preadmit Karnofsky:   %            General: alert  oriented x _    lethargic, distressed, cachexia,  nonverbal,  unarousable, verbal  HEENT: atraumatic, No abnormal lesions, No dry mouth,  temporal wasting, ET tube/trach  RESP: Reg rhythm,   tachypnea/labored breathing,   audible excessive secretions,  CTAB, diminished bilat bases  CV: RRR, S1S2,   tachycardia  GI: soft non distended non tender  incontinent               PEG/NG/OG tube                 constipation  last BM:   : normal  incontinent  oliguria/anuria  callejas  MUSK: weakness x4,  edema, ambulatory with assistance,   mostly/fully  BB/WC bound  SKIN: No abnormal skin lesions, Poor skin turgor, Pressure ulcer stage:   NEURO:  No deficits, cognitive impairment, encephalopathic dsyphagia dysarthria paresis  Oral intake ability: unable/only mouth care, minimal moderate full capability        BMI:  Wt:  Cachexia (Y/N):     PALLIATIVE MEDICINE COORDINATION OF CARE DOCUMENTATION: [x] Inpatient Consult  Non-Face-to-Face prolonged service provided that relates to (face-to-face) care that has or will occur and ongoing patient management, including one or more of the following: - Reviewed documentation from other physicians and other health care professional services - Reviewed medical records and diagnostic / radiology study results - Coordination with patient's support system  ************************************************************************  MEDICATION REVIEW:  - See Medication List Above    ISTOP REFERENCE:   - no active Rxs   - PRN usage: NO PRN'S  ------------------------------------------------------------------------  COORDINATION OF CARE:  - Palliative Care consulted for: GOC / Symptom Management  - Patient (to be) assessed:  - Patient previously seen by Palliative Care service: NO    ADVANCE CARE PLANNING  - Code status: FULL  - MOLST reviewed in chart: NONE; None found on Alpha  - HCP/ Surrogate: NONE found on Alpha  - GOC documents: NONE found on Alpha  - HCP/ Living will/Other Advanced Directives in Alpha: NONE found on Alpha  ------------------------------------------------------------------------  CARE PROVIDER DOCUMENTATION:  - SW/CM notes: Remains medically active  -   -     PLAN OF CARE  - Known admissions to [facility] in past year:  - Current admit date:  - LOS:  - LACE score:   - Current dispo plan: TO BE DETERMINED    22 (1d)  ------------------------------------------------------------------------  - Time Spent/Chart reviewed: 31 Minutes [including time used to gather, review and transfer data]  - Start: 318p  - End: 350p    Prolonged services rendered, as part of this patient's care provided by Palliative Medicine, include: i. chart review for provider and ancillary service documentation, ii. pertinent diagnostics including laboratory and imaging studies, iii. medication review including PRN use, iv. admission history including previous palliative care encounters and GOC notes, v. advance care planning documents including HCP and MOLST forms in Alpha. Part of Palliative Medicine extended evaluation and management also involves coordination of care with our IDT, the primary and consulting teams, and unit CM/SW and Hospice if eligible. Recommendations based on the information gathered and discussed are outlined in the A/P of Palliative notes. HPI:  89 yo with ho of HTN, HLD, CVA, non verbal / cognitive impairment, bedbound, lives at home with help of home health aide brought in after being found altered by HHA. Per HHA and family friend, patient is minimally verbal at baseline, can answer yes/no questions and sometimes string 2-3 words together. Since thursday, noticed patient was congestion and coughing, with decr PO intake and alertness. Notified outpatient PCP Dr. Madison Winters and was given robitussin. No obvious episodes of choking/aspirating on food. Normally eats regular gluten free diet, but since thursday only would eat food pureed by . No fever, chills, SOB, melena, hematuria per HHA. Brought pt to ED because home pulse ox showed o2 at 85%. Attempted to obtain ROS from patient but would not participate. Per family friend, patient sometimes chooses not to respond.     ED course:  Vitals: 99.6F, HR 65, /56, 15L NRB --> 99% on 5L NC  Labs: wbc 9.5, Hb 9.7, BUN 15/Cr 0.68, lactate 1.2 , trop 0.02, VBG, 7.44 pco2 42, po2 56, UA+ LE & nitrite   Imaging: CXR L elevated hemidiaphram, no acute infiltrates, EKG WNL  Interventions: vanc& zosyn, 250cc NS --> NS 120cc/hr  (2022 13:45)    Pt seen and examined. HHA at bedside. Appears overtly comfortable. Pt does not open eyes to verbal commands, winces to noxious stimuli. Unable to obtain. ROS. HHA at bedside reports prior to admission: pt with unknown amount of unintentional wt loss in the 2 months prior to admission. baseline prior to admission: nonverbal but generally alert and awake during the day, no agitation or owning, bedbound, total care, full feed with fair to good appetite, skin intact.     Reached out to pts POA sister Talita Haney for collateral and to obtain GOC, noted below.     PRESENT SYMPTOMS:   [x ]Unable to obtain due to poor mentation   Source if other than patient:  [x ]Family   [ ]Team     Pain [  ]    PAIN AD Score: 0  [email or web address]/[email or web address] (press ctrl +  left click to view)    If [  ], pt denies symptom.   Dyspnea:         [  ]  Anxiety:           [  ]  Difficulty sleeping: [  ]  Fatigue:           [  ]  Nausea:           [  ]  Loss of appetite:     [  ]  Dysphagia: [ x ]  Constipation:   [ x ]          Other Symptoms:    All other review of systems negative [ x ]    Opiate Naive (Y/N): Yes  iStop reviewed (Y/N): Yes.   No Rx found on iStop review (Ref#:    [id number]       PAST MEDICAL & SURGICAL HISTORY:  Diabetes  Cerebrovascular accident (CVA)  HLD (hyperlipidemia)  HTN (hypertension)  DM diabetes mellitus)  HLD (hyperlipidemia)  CVA (cerebral vascular accident)    No significant past surgical history    FAMILY HISTORY:   Reviewed and found non contributory in mother or father    Items that are not checked are not present  PSYCHOSOCIAL ASSESSMENT:  - Significant other/partner: [  ]  Children: [  ]  - Living Situation: Home [ x ] Long term care [  ]  Rehab[  ]  Other[  ]  - Support system: strong[  ] adequate[ x ] inadequate[  ]  - Baptist/Spiritual practice: Damian Catholid   - Role of organized Catholic important [ x ] some [  ] unable to assess dt pt mentation [  ]  - Coping: well[  ]  with difficulty[  ]  poor coping[  ]  unable to assess dt pt mentation [ x ]    ADVANCE DIRECTIVES:    - MOLST[ x ] Incomplete DNR molst in patient window  Living Will [  ]   DECISION MAKER(s):  - Health Care Proxy(s) [  ]  Surrogate(s)[  ] Guardian[  ]    PoA [x]  Name(s)/Phone Number(s):   - reported PoA is sister Talita Thomas [phone]     BASELINE (I)ADLs (prior to admission):  - [place]:  Total[  ] Moderate[  ] Dependent[ x ]    Allergies    morphine (Other)    Intolerances        Medications:      MEDICATIONS  (STANDING):  albuterol/ipratropium for Nebulization 3 milliLiter(s) Nebulizer every 6 hours  cefTRIAXone   IVPB 1000 milliGRAM(s) IV Intermittent every 24 hours  dextrose 5% + lactated ringers. 1000 milliLiter(s) (80 mL/Hr) IV Continuous <Continuous>  enoxaparin Injectable 40 milliGRAM(s) SubCutaneous every 24 hours  sodium chloride 0.9%. 1000 milliLiter(s) (80 mL/Hr) IV Continuous <Continuous>    MEDICATIONS  (PRN):      Labs:    CBC:                        8.3    7.13  )-----------( 279      ( 2022 07:41 )             27.2     CMP:        136  |  102  |  13  ----------------------------<  123<H>  3.7   |  26  |  0.51    Ca    9.3      2022 07:41  Phos  2.7       Mg     1.8         TPro  6.3  /  Alb  3.5  /  TBili  0.4  /  DBili  x   /  AST  19  /  ALT  10  /  AlkPhos  80      PT/INR - ( 2022 12:21 )   PT: 11.7 sec;   INR: 0.98          PTT - ( 2022 12:21 )  PTT:28.3 sec   Urinalysis Basic - ( 2022 12:38 )    Color: Yellow / Appearance: Turbid / S.015 / pH: x  Gluc: x / Ketone: NEGATIVE  / Bili: Small / Urobili: 0.2 E.U./dL   Blood: x / Protein: NEGATIVE mg/dL / Nitrite: POSITIVE   Leuk Esterase: Large / RBC: < 5 /HPF / WBC Many /HPF   Sq Epi: x / Non Sq Epi: 0-5 /HPF / Bacteria: Present /HPF        RADIOLOGY & ADDITIONAL STUDIES:  Imaging:  Reviewed  < from: Xray Chest 1 View-PORTABLE IMMEDIATE (Xray Chest 1 View-PORTABLE IMMEDIATE .) (22 @ 13:00) >  ACC: [id number] EXAM:  XR CHEST PORTABLE IMMED 1V                        PROCEDURE DATE:  2022    INTERPRETATION:  Clinical History: Pneumonia  Frontal examination of the chest demonstrates rotation of patient. The   heart is within normal limits in transverse diameter. No acute   infiltrates. Elevation left hemidiaphragm. Dextroscoliosis thoracic spine   with degenerative changes. Degenerative changes shoulder regions.    IMPRESSION: No acute infiltrates    PROTEIN CALORIE MALNUTRITION PRESENT:  [ ] Yes  [ ] No  Albumin, Serum: 3.5     [x ] PPSV2 < or = to 30%   [ ] significant weight loss    [ ] poor nutritional intake  [ ] catabolic state   [ ] anasarca       [ ] Artificial Nutrition    PEx:  T(C): 36.4 (22 @ 12:10), Max: 36.6 (22 @ 15:30)  HR: 61 (22 @ 12:10) (61 - 74)  BP: 170/71 (22 @ 12:10) (132/63 - 170/71)  RR: 17 (22 @ 12:10) (17 - 18)  SpO2: 97% (22 @ 12:10) (97% - 100%)  Wt(kg): --    ECOG Performance:     4  Current Palliative Performance Scale/Karnofsky Score: 30   %  Preadmit Karnofsky: 40  %            General: frail elderly woman somnolent oriented x 0 appears overtly comfortable   HEENT: atraumatic, No abnormal lesions, +dry mouth,  +temporal wasting,  RESP: Reg rhythm, No  tachypnea/labored breathing,  No   audible excessive secretions, diminished bilat bases  CV: RRR, S1S2, No    tachycardia  GI: soft non distended non tender incontinent   : incontinent   MUSK: weakness x4,  fully  WC bound  SKIN: No abnormal skin lesions, Poor skin turgor  NEURO:  +cognitive impairment +contracted upper and lower extremities  + dsyphagia   Oral intake ability: unable/only mouth care,      BMI: 19  Wt: 51  Cachexia (Y/N):  Y    PALLIATIVE MEDICINE COORDINATION OF CARE DOCUMENTATION: [x] Inpatient Consult  Non-Face-to-Face prolonged service provided that relates to (face-to-face) care that has or will occur and ongoing patient management, including one or more of the following: - Reviewed documentation from other physicians and other health care professional services - Reviewed medical records and diagnostic / radiology study results - Coordination with patient's support system  ************************************************************************  MEDICATION REVIEW:  - See Medication List Above    ISTOP REFERENCE:   - no active Rxs   - PRN usage: NO PRN'S  ------------------------------------------------------------------------  COORDINATION OF CARE:  - Palliative Care consulted for: GOC / Symptom Management  - Patient (to be) assessed:   - Patient previously seen by Palliative Care service: NO    ADVANCE CARE PLANNING  - Code status: DNR ?DNI  - MOLST reviewed in chart: incomplete DNR MOLST, second page is not completed or signed   - Reported PoA and decision maker sister Talita Thomas [phone]  - [facility] documents: NONE found on Alpha  - HCP/ Living will/Other Advanced Directives in Alpha: NONE found on Alpha  ------------------------------------------------------------------------  CARE PROVIDER DOCUMENTATION:  - SW/CM notes: Remains medically active  - SS: Pt with no attempt at bolus formation or manipulation, mouth remained open. NPO pending further GOC   - Med: abx for UTI     PLAN OF CARE  - Known admissions to [facility] in past year: current  - Current admit date:   - LOS: one day   - LACE score: 9  - Current dispo plan: home +/- home hospice     22 (1d)  ------------------------------------------------------------------------  - Time Spent/Chart reviewed: 31 Minutes [including time used to gather, review and transfer data]  - Start: 318p  - End: 350p    Prolonged services rendered, as part of this patient's care provided by Palliative Medicine, include: i. chart review for provider and ancillary service documentation, ii. pertinent diagnostics including laboratory and imaging studies, iii. medication review including PRN use, iv. admission history including previous palliative care encounters and GOC notes, v. advance care planning documents including HCP and MOLST forms in Alpha. Part of Palliative Medicine extended evaluation and management also involves coordination of care with our IDT, the primary and consulting teams, and unit CM/SW and Hospice if eligible. Recommendations based on the information gathered and discussed are outlined in the A/P of Palliative notes.

## 2022-07-18 NOTE — CONSULT NOTE ADULT - CONVERSATION DETAILS
Pts chart reviewed. Incomplete DNR only Molst in patient window. Pt unable to participate in complex decision making, reached out to pts sister, Talita, who reports she is pts PoA. Reviewed medical updates, explained that pt is being treated for UTI +/- aspiration PNA, which is likely driving pts ams.     Discussed advance directives including code status. Sister states that she would elect her sister to allow natural death in event of card/resp arrest. She doesn't want "to cause suffering or break [pts] ribs" and feels that if pt is intubated in ICU and unlikely to return to functional/cognitive baselines this would cause "more suffering."     Reviewed pts SS evaluation and explored long term feeding goals. Explained role of PEG and associated risks including aspiration, infection, fistulas, clogged/dislodged tubes that would lead to further hospitalizations; PEG would not increase patient's QOL or life expectancy. Sister argues that this would not be in line w GOC and she would prefer food for comfort if pt awake and willing.     She defers providing verbal consent to update MOLST and further discussion (of home hospice) until she speaks with primary team and receives medical updates. All questions answered. Support provided. Pts chart reviewed. Incomplete DNR only Molst in patient window. Pt unable to participate in complex decision making, at pts bedside reached out to pts sister, Talita, who reports she is pts PoA. Reviewed medical updates, explained that pt is being treated for UTI +/- aspiration PNA, which is likely driving pts ams.     Discussed advance directives including code status. Sister states that she would elect her sister to allow natural death in event of card/resp arrest. She doesn't want "to cause suffering or break [pts] ribs" and feels that if pt is intubated in ICU and unlikely to return to functional/cognitive baselines this would cause "more suffering."     Reviewed pts SS evaluation and explored long term feeding goals. Explained role of PEG and associated risks including aspiration, infection, fistulas, clogged/dislodged tubes that would lead to further hospitalizations; PEG would not increase patient's QOL or life expectancy. Sister argues that this would not be in line w GOC and she would prefer food for comfort if pt awake and willing.     She defers providing verbal consent to update MOLST and further discussion (of home hospice) until she speaks with primary team and receives medical updates. All questions answered. Support provided.

## 2022-07-19 DIAGNOSIS — G93.41 METABOLIC ENCEPHALOPATHY: ICD-10-CM

## 2022-07-19 LAB
ANION GAP SERPL CALC-SCNC: 10 MMOL/L — SIGNIFICANT CHANGE UP (ref 5–17)
BUN SERPL-MCNC: 8 MG/DL — SIGNIFICANT CHANGE UP (ref 7–23)
CALCIUM SERPL-MCNC: 9.5 MG/DL — SIGNIFICANT CHANGE UP (ref 8.4–10.5)
CHLORIDE SERPL-SCNC: 98 MMOL/L — SIGNIFICANT CHANGE UP (ref 96–108)
CO2 SERPL-SCNC: 27 MMOL/L — SIGNIFICANT CHANGE UP (ref 22–31)
CREAT SERPL-MCNC: 0.46 MG/DL — LOW (ref 0.5–1.3)
EGFR: 92 ML/MIN/1.73M2 — SIGNIFICANT CHANGE UP
GLUCOSE BLDC GLUCOMTR-MCNC: 127 MG/DL — HIGH (ref 70–99)
GLUCOSE BLDC GLUCOMTR-MCNC: 137 MG/DL — HIGH (ref 70–99)
GLUCOSE BLDC GLUCOMTR-MCNC: 95 MG/DL — SIGNIFICANT CHANGE UP (ref 70–99)
GLUCOSE SERPL-MCNC: 113 MG/DL — HIGH (ref 70–99)
HCT VFR BLD CALC: 28.5 % — LOW (ref 34.5–45)
HGB BLD-MCNC: 9.1 G/DL — LOW (ref 11.5–15.5)
MAGNESIUM SERPL-MCNC: 1.6 MG/DL — SIGNIFICANT CHANGE UP (ref 1.6–2.6)
MCHC RBC-ENTMCNC: 27.4 PG — SIGNIFICANT CHANGE UP (ref 27–34)
MCHC RBC-ENTMCNC: 31.9 GM/DL — LOW (ref 32–36)
MCV RBC AUTO: 85.8 FL — SIGNIFICANT CHANGE UP (ref 80–100)
NRBC # BLD: 0 /100 WBCS — SIGNIFICANT CHANGE UP (ref 0–0)
PHOSPHATE SERPL-MCNC: 2.2 MG/DL — LOW (ref 2.5–4.5)
PLATELET # BLD AUTO: 299 K/UL — SIGNIFICANT CHANGE UP (ref 150–400)
POTASSIUM SERPL-MCNC: 3.5 MMOL/L — SIGNIFICANT CHANGE UP (ref 3.5–5.3)
POTASSIUM SERPL-SCNC: 3.5 MMOL/L — SIGNIFICANT CHANGE UP (ref 3.5–5.3)
RBC # BLD: 3.32 M/UL — LOW (ref 3.8–5.2)
RBC # FLD: 14.6 % — HIGH (ref 10.3–14.5)
SODIUM SERPL-SCNC: 135 MMOL/L — SIGNIFICANT CHANGE UP (ref 135–145)
WBC # BLD: 7.5 K/UL — SIGNIFICANT CHANGE UP (ref 3.8–10.5)
WBC # FLD AUTO: 7.5 K/UL — SIGNIFICANT CHANGE UP (ref 3.8–10.5)

## 2022-07-19 PROCEDURE — 99233 SBSQ HOSP IP/OBS HIGH 50: CPT | Mod: GC

## 2022-07-19 PROCEDURE — 99233 SBSQ HOSP IP/OBS HIGH 50: CPT

## 2022-07-19 RX ORDER — ENOXAPARIN SODIUM 100 MG/ML
30 INJECTION SUBCUTANEOUS EVERY 24 HOURS
Refills: 0 | Status: DISCONTINUED | OUTPATIENT
Start: 2022-07-19 | End: 2022-07-28

## 2022-07-19 RX ORDER — SODIUM CHLORIDE 9 MG/ML
1000 INJECTION, SOLUTION INTRAVENOUS
Refills: 0 | Status: DISCONTINUED | OUTPATIENT
Start: 2022-07-19 | End: 2022-07-20

## 2022-07-19 RX ORDER — POTASSIUM PHOSPHATE, MONOBASIC POTASSIUM PHOSPHATE, DIBASIC 236; 224 MG/ML; MG/ML
30 INJECTION, SOLUTION INTRAVENOUS ONCE
Refills: 0 | Status: COMPLETED | OUTPATIENT
Start: 2022-07-19 | End: 2022-07-19

## 2022-07-19 RX ADMIN — Medication 3 MILLILITER(S): at 16:24

## 2022-07-19 RX ADMIN — PIPERACILLIN AND TAZOBACTAM 200 GRAM(S): 4; .5 INJECTION, POWDER, LYOPHILIZED, FOR SOLUTION INTRAVENOUS at 06:17

## 2022-07-19 RX ADMIN — PIPERACILLIN AND TAZOBACTAM 200 GRAM(S): 4; .5 INJECTION, POWDER, LYOPHILIZED, FOR SOLUTION INTRAVENOUS at 11:34

## 2022-07-19 RX ADMIN — PIPERACILLIN AND TAZOBACTAM 200 GRAM(S): 4; .5 INJECTION, POWDER, LYOPHILIZED, FOR SOLUTION INTRAVENOUS at 00:21

## 2022-07-19 RX ADMIN — SODIUM CHLORIDE 80 MILLILITER(S): 9 INJECTION, SOLUTION INTRAVENOUS at 21:41

## 2022-07-19 RX ADMIN — Medication 3 MILLILITER(S): at 21:42

## 2022-07-19 RX ADMIN — PIPERACILLIN AND TAZOBACTAM 200 GRAM(S): 4; .5 INJECTION, POWDER, LYOPHILIZED, FOR SOLUTION INTRAVENOUS at 18:04

## 2022-07-19 RX ADMIN — Medication 3 MILLILITER(S): at 04:03

## 2022-07-19 RX ADMIN — POTASSIUM PHOSPHATE, MONOBASIC POTASSIUM PHOSPHATE, DIBASIC 83.33 MILLIMOLE(S): 236; 224 INJECTION, SOLUTION INTRAVENOUS at 09:42

## 2022-07-19 RX ADMIN — ENOXAPARIN SODIUM 30 MILLIGRAM(S): 100 INJECTION SUBCUTANEOUS at 18:04

## 2022-07-19 RX ADMIN — Medication 3 MILLILITER(S): at 09:42

## 2022-07-19 RX ADMIN — SODIUM CHLORIDE 80 MILLILITER(S): 9 INJECTION, SOLUTION INTRAVENOUS at 19:40

## 2022-07-19 NOTE — PROGRESS NOTE ADULT - PROBLEM SELECTOR PLAN 3
hx CVA. cb toxic metabolic encephalopathy 2/2 UTI. currently somnolent and unable to participate in ROS.   - Risk for delirium, reorient patient and reinforce pts safety   - Avoid benzos, sedating and anticholinergic medications   Attempted to speak to patient sister regarding ongoing GOC, but had no interest in engaging with Nurse practitioner and only wanted to speak to physicians,.

## 2022-07-19 NOTE — PROGRESS NOTE ADULT - PROBLEM SELECTOR PLAN 3
Pt with decreased PO intake per HHA. On speech and swallow evaluation, pt not able to swallow. Pt currently NPO.  - NPO  - no tube feeds for now, per sister (health care proxy)  - D5 LR 80mL/hr  - nutrition consult Pt with decreased PO intake per HHA. On speech and swallow evaluation, pt not able to swallow. Pt currently NPO. Pt w/ increased oral secretions today.  - NPO, aspiration precautions  - no tube feeds for now, per sister (health care proxy)  - continue D5 LR 80mL/hr  - POCT q6  - nutrition consult

## 2022-07-19 NOTE — PROGRESS NOTE ADULT - PROBLEM SELECTOR PLAN 2
per chart review and HHA, pt w hx of asp with PO in weeks prior to admission.   - SS: Pt with no attempt at bolus formation or manipulation, mouth remained open. NPO pending further GOC   - discussed long term feeding GOC as above, sister stating that PEG would not be w/in GOC given risks, complications  - recommend allowing food for comfort w texture recs per SS if pt is awake, willing to eat.

## 2022-07-19 NOTE — PROGRESS NOTE ADULT - PROBLEM SELECTOR PLAN 8
Hx DM, no currently on any medication. A1C 4.8 (7/17/2022).  - NPO   - SSI when tolerating PO Hx MDD on bupropion 150mg XL  - hold po meds, NPO  - resume when mental status improves

## 2022-07-19 NOTE — PROGRESS NOTE ADULT - ASSESSMENT
87 yo with ho of HTN, HLD, CVA, non verbal / cognitive impairment, bedbound, lives at home with help of home health aide brought in after being found altered and decreased PO intake, found to have UTI and new O2 requirements.   89 yo with ho of HTN, HLD, multiple CVAs (bed bound w/ 24/7 HHA, non verbal, cognitive impairment) brought in after being found altered and decreased PO intake, found to have UTI and new O2 requirements.      Lactate negative.    # Acute Metabolic encephalopathy likely secondary to an infectious etiology (UTI vs. pneumonia)  # Uncomplicated Urinary tract infection  # Suspected Gram negative Pneumonia   # Oropharyngeal Dysphagia   # Failure to thrive  # History of prior CVAs  # Hypertension  # Hyperlipidemia  # Functional Quadriplegia     Plan:  - IV fluid hydration with D5LR at 80ml/hr   - F/u Urine and Blood cx   - patient does not have any prior hx of Pseudomonas infection , she does not have any concern for abd infection , zosyn will be changed to Ceftriaxone for total of 5 days to cover for both Uncomplicated UTI and Possible aspiration or gram negative Pneumonia   - NPO for now, SLP evaluation  - Strict aspiration precautions  - Palliative care consult  - DVT ppx .    Sister is the HCP   Phone number in the Chart  87 yo with ho of HTN, HLD, multiple CVAs (bed bound w/ 24/7 HHA, non verbal, cognitive impairment) brought in after being found altered and decreased PO intake, found to have UTI and new O2 requirements.

## 2022-07-19 NOTE — PROGRESS NOTE ADULT - PROBLEM SELECTOR PLAN 7
Hx MDD on bupropion 150mg XL  - hold po meds given AMS  - resume when mental status improves Hx CVA. Home med ASA, plavix. non verbal bedbound at baseline  - hold meds for now given AMS  - c/w asa 81mg & plavix after dysphagia screen  - palliative consult

## 2022-07-19 NOTE — PROGRESS NOTE ADULT - PROBLEM SELECTOR PLAN 2
No hx of COPD or lung disease, not on home O2. Initially placed on 15 L NRB then transitioned to 5L NC sat 99%. No accessory muscle use or incr WOB. CXR with elevated L hemidiaphragm, possibly positional, no acute infiltrates. CXR with no acute infiltrates however given new hypoxia and AMS, must consider aspiration PNA.   - duoneb q6hr  - continue to wean O2, currently satting well 5L NC  - see management above UA with pyuria, bacteruria, LE, nitrites. Unable to assess if patient symptomatic however more altered/lethargic from baseline on admission. No leukocytosis, negative lactate, not meeting SIRS criteria.   - s/p zosyn, s/p CTX  - restart zosyn 3.375g q6 IV for Pseudomonal coverage (start date 7/18)    - urine cx: Pseudomonas (prelim)  - blood cx: gram pos cocci in clusters, PCR: coag neg  - Ustrep neg, U legionella pending  - RVP neg

## 2022-07-19 NOTE — PROGRESS NOTE ADULT - PROBLEM SELECTOR PLAN 10
F: NC 80cc/hr  E: replete to K>4, Mg>2, Phos>2.5  N: NPO pending mental status improvement/dysphagia screen  Ppx: lovenox    Code Status: DNR - MOLST in chart     Dispo: [facility] Hx HTN. Home amlodipine 10mg, ramipril 20mg qd   - hold for now given NPO    #HLD: lipitor 40  - hold for now given NPO

## 2022-07-19 NOTE — PROGRESS NOTE ADULT - PROBLEM SELECTOR PLAN 9
Hx HTN. home amlodipine 10mg, ramipril 20mg qd   - hold for now given AMS    #HLD: lipitor 40  - hold for now given AMS Hx DM, no currently on any medication. A1C 4.8 (7/17/2022).  - NPO   - SSI when tolerating PO

## 2022-07-19 NOTE — PROGRESS NOTE ADULT - SUBJECTIVE AND OBJECTIVE BOX
ADRIANNE OMALLEY             [id number]    CC: AMS    HPI:  89 yo with ho of HTN, HLD, CVA, non verbal / cognitive impairment, bedbound, lives at home with help of home health aide brought in after being found altered by HHA. Per HHA and family friend, patient is minimally verbal at baseline, can answer yes/no questions and sometimes string 2-3 words together. Since thursday, noticed patient was congestion and coughing, with decr PO intake and alertness. Notified outpatient PCP Dr. Madison Winters and was given robitussin. No obvious episodes of choking/aspirating on food. Normally eats regular gluten free diet, but since thursday only would eat food pureed by . No fever, chills, SOB, melena, hematuria per HHA. Brought pt to ED because home pulse ox showed o2 at 85%. Attempted to obtain ROS from patient but would not participate. Per family friend, patient sometimes chooses not to respond.     ED course:  Vitals: 99.6F, HR 65, /56, 15L NRB --> 99% on 5L NC  Labs: wbc 9.5, Hb 9.7, BUN 15/Cr 0.68, lactate 1.2 , trop 0.02, VBG, 7.44 pco2 42, po2 56, UA+ LE & nitrite   Imaging: CXR L elevated hemidiaphram, no acute infiltrates, EKG WNL  Interventions: vanc& zosyn, 250cc NS --> NS 120cc/hr  (17 Jul 2022 13:45)    SUBJECTIVE: Patient resting in bed, with private HHA at bedside. Patient is currently mouth breathing.    ROS:  DYSPNEA (Geronimo): 0  NAUS/VOM: N	  SECRETIONS: Y	  AGITATION: N  Pain (Y/N):     N  -Provocation/Palliation: n/a   -Quality/Quantity: n/a   -Radiating: n/a   -Severity: n/a   -Timing/Frequency: n/a   -Impact on ADLs: n/a     OTHER REVIEW OF SYSTEMS: uanble to obtain   UNABLE TO OBTAIN  due to: Cognitive impairment     PEx:  T(C): 36.5 (07-19-22 @ 05:06), Max: 37.1 (07-18-22 @ 21:28)  HR: 70 (07-19-22 @ 07:23) (70 - 72)  BP: 156/60 (07-19-22 @ 07:23) (148/66 - 183/69)  RR: 17 (07-19-22 @ 05:06) (17 - 18)  SpO2: 100% (07-19-22 @ 05:06) (100% - 100%)  Wt(kg): 51.2kG    GENERAL:  [x ]Alert  [ x]Oriented x 0  [ ]Lethargic due to sedation  [ ]Cachexia [ ]Verbal  [ x]Non-Verbal  Behavioral:   [ ] Anxiety  [ ] Delirium [ ] Agitation [x ] Other -calm   HEENT:  [ ]Normal   [x ]Dry mouth   [ ]ET Tube  [ ]Oral lesions  PULMONARY:   [ ]Clear  [ ]Tachypnea  [x ]Audible excessive secretions   [ x]Rhonchi     [ ]Right [ ]Left [ ]Bilateral  [ ]Crackles        [ ]Right [ ]Left [ ]Bilateral  [ ]Wheezing     [ ]Right [ ]Left [ ]Bilateral  CARDIOVASCULAR:    [ x]Regular [ ]Irregular [ ]Tachy  [ ]Brayan [ ]Murmur [ ]Other  GASTROINTESTINAL:  [x ]Soft  [ ]Distended   [ ]+BS  [x ]Non tender [ ]Tender  [ ]PEG [ ]OGT/NGT  [] flexiseal with output  Last BM:   GENITOURINARY:  [ ]Normal [x ] Incontinent   [ ]Oliguria/Anuria   [ ]Puente  MUSCULOSKELETAL:   [ ]Normal   [ ]Weakness  [x ]Bed/Wheelchair bound [ ]Edema  NEUROLOGIC:   [ ]No focal deficits  [x ] Cognitive impairment  [ ] Dysphagia [ ]Dysarthria [ ] Paresis [  ]Other   SKIN:   [ x]Normal   [ ]Pressure ulcer(s)  [ ]Rash       ALLERGIES: morphine (Other)      OPIATE NAÏVE (Y/N): Y    MEDICATIONS: REVIEWED  MEDICATIONS  (STANDING):  albuterol/ipratropium for Nebulization 3 milliLiter(s) Nebulizer every 6 hours  dextrose 5% + lactated ringers. 1000 milliLiter(s) (80 mL/Hr) IV Continuous <Continuous>  dextrose 5% + lactated ringers. 1000 milliLiter(s) (80 mL/Hr) IV Continuous <Continuous>  enoxaparin Injectable 30 milliGRAM(s) SubCutaneous every 24 hours  piperacillin/tazobactam IVPB.. 3.375 Gram(s) IV Intermittent every 6 hours    MEDICATIONS  (PRN):      LABS: REVIEWED  CBC:                        9.1    7.50  )-----------( 299      ( 19 Jul 2022 07:05 )             28.5     CMP:    07-19    135  |  98  |  8   ----------------------------<  113<H>  3.5   |  27  |  0.46<L>    Ca    9.5      19 Jul 2022 07:05  Phos  2.2     07-19  Mg     1.6     07-19    TPro  6.3  /  Alb  3.5  /  TBili  0.4  /  DBili  x   /  AST  19  /  ALT  10  /  AlkPhos  80  07-18    IMAGING: REVIEWED    ADVANCED DIRECTIVES:            DNR DNI            MOLST    DECISION MAKER: Darci is unable to make decisions for herself  LEGAL SURROGATE: Talita Thomas [phone]    PSYCHOSOCIAL-SPIRITUAL ASSESSMENT:       Reviewed       Care plan unchanged    GOALS OF CARE DISCUSSION       Palliative care info/counseling provided	           Family meeting       Advanced Directives addressed please see Advance Care Planning Note	           See previous Palliative Medicine Note       Documentation of GOC: DNR/DNI  	      AGENCY CHOICE DISCUSSED:           Homecare        Hospice    REFERRALS	        Palliative Med        Patient and family support       Unit SW/Case Mgmt

## 2022-07-19 NOTE — PROGRESS NOTE ADULT - PROBLEM SELECTOR PLAN 6
Patient remains a DNR/DNI and High risk of aspiration.  Attempted to speak to patients sister and introduce the idea of hospice, but patients sister did not want to engage in conversation with nurse practitioner and asked that patient Physicians call her back.   - [employer]/Spiritual practice: Unknown   - Coping: [ ] well [ ] with difficulty [ ] poor coping  [x] Unable to obtain to due metnal status   - Support system: [ ] strong [ x] adequate [ ] inadequate  - All questions answered, emotional support provided  -  primary team   - Please contact Palliative Medicine 24/7 at [phone] for any acute symptoms or further questions  - Will continue to follow with you

## 2022-07-19 NOTE — PROGRESS NOTE ADULT - PROBLEM SELECTOR PLAN 4
Presenting with troponin 0.02, unable to assess if patient is having chest pain. EKG x2 normal, no ST elevations. Repeat troponin 0.02.  - other cardiac enzymes WNL: , CKMB 1.9   - if up trending, repeat EKG No hx of COPD or lung disease, not on home O2. Initially placed on 15 L NRB then transitioned to 5L NC sat 99%. No accessory muscle use or incr WOB. CXR with elevated L hemidiaphragm, possibly positional, no acute infiltrates. CXR with no acute infiltrates however given new hypoxia and AMS, must consider aspiration PNA.   - duoneb q6hr  - continue to wean O2, currently on 2L NC

## 2022-07-19 NOTE — PROGRESS NOTE ADULT - PROBLEM SELECTOR PLAN 5
Known hx of anemia (baseline 2021 9.8). Iron studies in 03/2021 showed Fe borderline low at 29; TIBC low at 184. No signs of active bleeding, no melena. Per allscripts, taking ferrous sulfate 01/2022 but per formal med rec with HHA no longer taking.  - monitor H/H  - active T&S  - transfuse Hb <7.0 Presenting with troponin 0.02, unable to assess if patient is having chest pain. EKG x2 normal, no ST elevations. Repeat troponin 0.02.  - other cardiac enzymes WNL: , CKMB 1.9   - if up trending, repeat EKG

## 2022-07-19 NOTE — PROGRESS NOTE ADULT - PROBLEM SELECTOR PLAN 5
functional and cognitive baselines prior to admission: non verbal, total care, bed bound, full feed. HHA endorses weight loss in weeks prior to admission.  - hospice eligible.

## 2022-07-19 NOTE — PROGRESS NOTE ADULT - PROBLEM SELECTOR PLAN 6
Hx CVA. Home med ASA, plavix. non verbal bedbound at baseline  - hold meds for now given AMS  - c/w asa 81mg & plavix after dysphagia screen  - palliative consult Known hx of anemia (baseline 2021 9.8). Iron studies in 03/2021 showed Fe borderline low at 29; TIBC low at 184. No signs of active bleeding, no melena. Per allscripts, taking ferrous sulfate 01/2022 but per formal med rec with HHA no longer taking.  - monitor H/H  - active T&S  - transfuse Hb <7.0

## 2022-07-19 NOTE — PROGRESS NOTE ADULT - ASSESSMENT
88F with multiple CVA,, HTN HLD presented 7/17 from home with altered mental status and failure to thrive. +Uti. Palliative care consulted for GOC.

## 2022-07-19 NOTE — PROGRESS NOTE ADULT - SUBJECTIVE AND OBJECTIVE BOX
OVERNIGHT EVENTS:     SUBJECTIVE / INTERVAL HPI: Patient seen and examined at bedside.     VITAL SIGNS:  Vital Signs Last 24 Hrs  T(C): 36.5 (2022 05:06), Max: 37.1 (2022 21:28)  T(F): 97.7 (2022 05:06), Max: 98.7 (2022 21:28)  HR: 72 (2022 05:06) (61 - 72)  BP: 183/69 (2022 05:06) (148/66 - 183/69)  BP(mean): --  RR: 17 (2022 05:06) (17 - 18)  SpO2: 100% (2022 05:06) (97% - 100%)    Parameters below as of 2022 05:06  Patient On (Oxygen Delivery Method): nasal cannula  O2 Flow (L/min): 2      PHYSICAL EXAM:    General: NAD  HEENT: NC/AT; PERRL, anicteric sclera; MMM  Neck: supple  Cardiovascular: +S1/S2; RRR  Respiratory: CTA B/L; no W/R/R  Gastrointestinal: soft, NT/ND; +BSx4  Extremities: WWP; no edema, clubbing or cyanosis  Vascular: 2+ radial, DP/PT pulses B/L  Neurological: AAOx3; no focal deficits    MEDICATIONS:  MEDICATIONS  (STANDING):  albuterol/ipratropium for Nebulization 3 milliLiter(s) Nebulizer every 6 hours  dextrose 5% + lactated ringers. 1000 milliLiter(s) (80 mL/Hr) IV Continuous <Continuous>  enoxaparin Injectable 40 milliGRAM(s) SubCutaneous every 24 hours  piperacillin/tazobactam IVPB.. 3.375 Gram(s) IV Intermittent every 6 hours  sodium chloride 0.9%. 1000 milliLiter(s) (80 mL/Hr) IV Continuous <Continuous>    MEDICATIONS  (PRN):      ALLERGIES:  Allergies    morphine (Other)    Intolerances        LABS:                        8.3    7.13  )-----------( 279      ( 2022 07:41 )             27.2     07-18    136  |  102  |  13  ----------------------------<  123<H>  3.7   |  26  |  0.51    Ca    9.3      2022 07:41  Phos  2.7     07-18  Mg     1.8     07-18    TPro  6.3  /  Alb  3.5  /  TBili  0.4  /  DBili  x   /  AST  19  /  ALT  10  /  AlkPhos  80  07-18    PT/INR - ( 2022 12:21 )   PT: 11.7 sec;   INR: 0.98          PTT - ( 2022 12:21 )  PTT:28.3 sec  Urinalysis Basic - ( 2022 12:38 )    Color: Yellow / Appearance: Turbid / S.015 / pH: x  Gluc: x / Ketone: NEGATIVE  / Bili: Small / Urobili: 0.2 E.U./dL   Blood: x / Protein: NEGATIVE mg/dL / Nitrite: POSITIVE   Leuk Esterase: Large / RBC: < 5 /HPF / WBC Many /HPF   Sq Epi: x / Non Sq Epi: 0-5 /HPF / Bacteria: Present /HPF      CAPILLARY BLOOD GLUCOSE          RADIOLOGY & ADDITIONAL TESTS: Reviewed.   SUBJECTIVE / INTERVAL HPI: No overnight events. Patient seen and examined at bedside. Pt more arousable this morning, able to intermittently answer yes/no questions. Denies chest pain, abdominal pain, dysuria, fever. Increased oral secretions overnight, suctioned 300mL.    VITAL SIGNS:  Vital Signs Last 24 Hrs  T(C): 36.5 (2022 05:06), Max: 37.1 (2022 21:28)  T(F): 97.7 (2022 05:06), Max: 98.7 (2022 21:28)  HR: 72 (2022 05:06) (61 - 72)  BP: 183/69 (2022 05:06) (148/66 - 183/69)  BP(mean): --  RR: 17 (2022 05:06) (17 - 18)  SpO2: 100% (2022 05:06) (97% - 100%)    Parameters below as of 2022 05:06  Patient On (Oxygen Delivery Method): nasal cannula  O2 Flow (L/min): 2      PHYSICAL EXAM:    General: thin, frail female, resting in bed  HEENT: normocephalic, anicteric sclera; mouth open; MMM  Neck: rigid (chronic)  Cardiovascular: holosystolic murmur, regular rate and rhythm  Respiratory: CTAB anteriorly; no increased work of breathing; observed 300mL oral secretions in cannister  Gastrointestinal: soft, NT/ND; +BSx4  Extremities: WWP; no edema  Neurological: AAOx0; only able to answer yes no questions, intermittently follows commands, bedridden    MEDICATIONS:  MEDICATIONS  (STANDING):  albuterol/ipratropium for Nebulization 3 milliLiter(s) Nebulizer every 6 hours  dextrose 5% + lactated ringers. 1000 milliLiter(s) (80 mL/Hr) IV Continuous <Continuous>  enoxaparin Injectable 40 milliGRAM(s) SubCutaneous every 24 hours  piperacillin/tazobactam IVPB.. 3.375 Gram(s) IV Intermittent every 6 hours  sodium chloride 0.9%. 1000 milliLiter(s) (80 mL/Hr) IV Continuous <Continuous>    MEDICATIONS  (PRN):      ALLERGIES:  Allergies    morphine (Other)    Intolerances        LABS:                        8.3    7.13  )-----------( 279      ( 2022 07:41 )             27.2     07-18    136  |  102  |  13  ----------------------------<  123<H>  3.7   |  26  |  0.51    Ca    9.3      2022 07:41  Phos  2.7     07  Mg     1.8         TPro  6.3  /  Alb  3.5  /  TBili  0.4  /  DBili  x   /  AST  19  /  ALT  10  /  AlkPhos  80  07-18    PT/INR - ( 2022 12:21 )   PT: 11.7 sec;   INR: 0.98          PTT - ( 2022 12:21 )  PTT:28.3 sec  Urinalysis Basic - ( 2022 12:38 )    Color: Yellow / Appearance: Turbid / S.015 / pH: x  Gluc: x / Ketone: NEGATIVE  / Bili: Small / Urobili: 0.2 E.U./dL   Blood: x / Protein: NEGATIVE mg/dL / Nitrite: POSITIVE   Leuk Esterase: Large / RBC: < 5 /HPF / WBC Many /HPF   Sq Epi: x / Non Sq Epi: 0-5 /HPF / Bacteria: Present /HPF      CAPILLARY BLOOD GLUCOSE          RADIOLOGY & ADDITIONAL TESTS: Reviewed.

## 2022-07-19 NOTE — PROGRESS NOTE ADULT - PROBLEM SELECTOR PLAN 1
Pt s/p CVA, non verbal w/ cognitive impairment and bedbound. Per HHA has been more altered/lethargic from baseline likely 2/2 UTI. UA with pyuria, bacteruria, LE, nitrites. Unable to assess if patient symptomatic however more altered/lethargic from baseline. No leukocytosis, negative lactate, not meeting SIRS criteria.   - d/c zosyn, start CTX 1g IV (3 day course, start date 7/18)  - urine cx: Pseudomonas (prelim)  - blood cx: gram pos cocci in clusters  - Ustrep neg, U legionella pending  - RVP neg    Urine + LE and nitrite.    - c/w zosyns 3.375 q6hr  - f/u Ucx Pt s/p CVA, baseline non verbal w/ cognitive impairment and bedbound. Per HHA has been more altered/lethargic from baseline for few days with decreased PO intake likely 2/2 UTI (UA with pyuria, bacteriuria). UCx growing Pseudomonas.   Pt also presented with hypoxia, raising c/f aspiration PNA, however CXR with no acute infiltrates and pt improving with decreased O2 requirements.  Mental status slightly improved from yesterday.  - UTI tx per below  - hypoxia tx per below    - UCx: Pseudomonas  - BCx: gram + cocci in clusters, PCR coag neg

## 2022-07-19 NOTE — PROGRESS NOTE ADULT - PROBLEM SELECTOR PLAN 11
F: NC 80cc/hr  E: replete to K>4, Mg>2, Phos>2.5  N: NPO pending mental status improvement/dysphagia screen  Ppx: lovenox 30mg qD    Code Status: DNR - MOLST in chart     Dispo: [facility]

## 2022-07-20 LAB
-  AZTREONAM: SIGNIFICANT CHANGE UP
-  CEFEPIME: SIGNIFICANT CHANGE UP
-  CLINDAMYCIN: SIGNIFICANT CHANGE UP
-  CLINDAMYCIN: SIGNIFICANT CHANGE UP
-  ERYTHROMYCIN: SIGNIFICANT CHANGE UP
-  ERYTHROMYCIN: SIGNIFICANT CHANGE UP
-  GENTAMICIN: SIGNIFICANT CHANGE UP
-  LINEZOLID: SIGNIFICANT CHANGE UP
-  LINEZOLID: SIGNIFICANT CHANGE UP
-  OXACILLIN: SIGNIFICANT CHANGE UP
-  OXACILLIN: SIGNIFICANT CHANGE UP
-  PIPERACILLIN/TAZOBACTAM: SIGNIFICANT CHANGE UP
-  RIFAMPIN: SIGNIFICANT CHANGE UP
-  RIFAMPIN: SIGNIFICANT CHANGE UP
-  TOBRAMYCIN: SIGNIFICANT CHANGE UP
-  TRIMETHOPRIM/SULFAMETHOXAZOLE: SIGNIFICANT CHANGE UP
-  TRIMETHOPRIM/SULFAMETHOXAZOLE: SIGNIFICANT CHANGE UP
-  VANCOMYCIN: SIGNIFICANT CHANGE UP
-  VANCOMYCIN: SIGNIFICANT CHANGE UP
ANION GAP SERPL CALC-SCNC: 9 MMOL/L — SIGNIFICANT CHANGE UP (ref 5–17)
BASOPHILS # BLD AUTO: 0.07 K/UL — SIGNIFICANT CHANGE UP (ref 0–0.2)
BASOPHILS NFR BLD AUTO: 0.9 % — SIGNIFICANT CHANGE UP (ref 0–2)
BUN SERPL-MCNC: 8 MG/DL — SIGNIFICANT CHANGE UP (ref 7–23)
CALCIUM SERPL-MCNC: 9.2 MG/DL — SIGNIFICANT CHANGE UP (ref 8.4–10.5)
CHLORIDE SERPL-SCNC: 100 MMOL/L — SIGNIFICANT CHANGE UP (ref 96–108)
CO2 SERPL-SCNC: 28 MMOL/L — SIGNIFICANT CHANGE UP (ref 22–31)
CREAT SERPL-MCNC: 0.54 MG/DL — SIGNIFICANT CHANGE UP (ref 0.5–1.3)
CULTURE RESULTS: SIGNIFICANT CHANGE UP
EGFR: 88 ML/MIN/1.73M2 — SIGNIFICANT CHANGE UP
EOSINOPHIL # BLD AUTO: 0.09 K/UL — SIGNIFICANT CHANGE UP (ref 0–0.5)
EOSINOPHIL NFR BLD AUTO: 1.2 % — SIGNIFICANT CHANGE UP (ref 0–6)
GLUCOSE BLDC GLUCOMTR-MCNC: 134 MG/DL — HIGH (ref 70–99)
GLUCOSE BLDC GLUCOMTR-MCNC: 137 MG/DL — HIGH (ref 70–99)
GLUCOSE BLDC GLUCOMTR-MCNC: 148 MG/DL — HIGH (ref 70–99)
GLUCOSE SERPL-MCNC: 142 MG/DL — HIGH (ref 70–99)
HCT VFR BLD CALC: 27.3 % — LOW (ref 34.5–45)
HGB BLD-MCNC: 8.7 G/DL — LOW (ref 11.5–15.5)
IMM GRANULOCYTES NFR BLD AUTO: 0.3 % — SIGNIFICANT CHANGE UP (ref 0–1.5)
LYMPHOCYTES # BLD AUTO: 1.91 K/UL — SIGNIFICANT CHANGE UP (ref 1–3.3)
LYMPHOCYTES # BLD AUTO: 24.7 % — SIGNIFICANT CHANGE UP (ref 13–44)
MAGNESIUM SERPL-MCNC: 1.7 MG/DL — SIGNIFICANT CHANGE UP (ref 1.6–2.6)
MCHC RBC-ENTMCNC: 26.8 PG — LOW (ref 27–34)
MCHC RBC-ENTMCNC: 31.9 GM/DL — LOW (ref 32–36)
MCV RBC AUTO: 84 FL — SIGNIFICANT CHANGE UP (ref 80–100)
METHOD TYPE: SIGNIFICANT CHANGE UP
MONOCYTES # BLD AUTO: 0.69 K/UL — SIGNIFICANT CHANGE UP (ref 0–0.9)
MONOCYTES NFR BLD AUTO: 8.9 % — SIGNIFICANT CHANGE UP (ref 2–14)
NEUTROPHILS # BLD AUTO: 4.95 K/UL — SIGNIFICANT CHANGE UP (ref 1.8–7.4)
NEUTROPHILS NFR BLD AUTO: 64 % — SIGNIFICANT CHANGE UP (ref 43–77)
NRBC # BLD: 0 /100 WBCS — SIGNIFICANT CHANGE UP (ref 0–0)
ORGANISM # SPEC MICROSCOPIC CNT: SIGNIFICANT CHANGE UP
PHOSPHATE SERPL-MCNC: 3.1 MG/DL — SIGNIFICANT CHANGE UP (ref 2.5–4.5)
PLATELET # BLD AUTO: 298 K/UL — SIGNIFICANT CHANGE UP (ref 150–400)
POTASSIUM SERPL-MCNC: 3.6 MMOL/L — SIGNIFICANT CHANGE UP (ref 3.5–5.3)
POTASSIUM SERPL-SCNC: 3.6 MMOL/L — SIGNIFICANT CHANGE UP (ref 3.5–5.3)
RBC # BLD: 3.25 M/UL — LOW (ref 3.8–5.2)
RBC # FLD: 14.6 % — HIGH (ref 10.3–14.5)
SODIUM SERPL-SCNC: 137 MMOL/L — SIGNIFICANT CHANGE UP (ref 135–145)
SPECIMEN SOURCE: SIGNIFICANT CHANGE UP
WBC # BLD: 7.73 K/UL — SIGNIFICANT CHANGE UP (ref 3.8–10.5)
WBC # FLD AUTO: 7.73 K/UL — SIGNIFICANT CHANGE UP (ref 3.8–10.5)

## 2022-07-20 PROCEDURE — 99233 SBSQ HOSP IP/OBS HIGH 50: CPT

## 2022-07-20 PROCEDURE — 99222 1ST HOSP IP/OBS MODERATE 55: CPT

## 2022-07-20 PROCEDURE — 99497 ADVNCD CARE PLAN 30 MIN: CPT

## 2022-07-20 PROCEDURE — 99233 SBSQ HOSP IP/OBS HIGH 50: CPT | Mod: GC

## 2022-07-20 RX ORDER — MAGNESIUM SULFATE 500 MG/ML
2 VIAL (ML) INJECTION ONCE
Refills: 0 | Status: COMPLETED | OUTPATIENT
Start: 2022-07-20 | End: 2022-07-20

## 2022-07-20 RX ORDER — POTASSIUM CHLORIDE 20 MEQ
20 PACKET (EA) ORAL ONCE
Refills: 0 | Status: COMPLETED | OUTPATIENT
Start: 2022-07-20 | End: 2022-07-20

## 2022-07-20 RX ORDER — SODIUM CHLORIDE 9 MG/ML
1000 INJECTION, SOLUTION INTRAVENOUS
Refills: 0 | Status: DISCONTINUED | OUTPATIENT
Start: 2022-07-20 | End: 2022-07-20

## 2022-07-20 RX ORDER — SODIUM CHLORIDE 9 MG/ML
1000 INJECTION, SOLUTION INTRAVENOUS
Refills: 0 | Status: DISCONTINUED | OUTPATIENT
Start: 2022-07-20 | End: 2022-07-21

## 2022-07-20 RX ADMIN — PIPERACILLIN AND TAZOBACTAM 200 GRAM(S): 4; .5 INJECTION, POWDER, LYOPHILIZED, FOR SOLUTION INTRAVENOUS at 18:17

## 2022-07-20 RX ADMIN — PIPERACILLIN AND TAZOBACTAM 200 GRAM(S): 4; .5 INJECTION, POWDER, LYOPHILIZED, FOR SOLUTION INTRAVENOUS at 05:12

## 2022-07-20 RX ADMIN — Medication 25 GRAM(S): at 09:12

## 2022-07-20 RX ADMIN — Medication 50 MILLIEQUIVALENT(S): at 10:43

## 2022-07-20 RX ADMIN — Medication 3 MILLILITER(S): at 05:13

## 2022-07-20 RX ADMIN — SODIUM CHLORIDE 80 MILLILITER(S): 9 INJECTION, SOLUTION INTRAVENOUS at 05:14

## 2022-07-20 RX ADMIN — PIPERACILLIN AND TAZOBACTAM 200 GRAM(S): 4; .5 INJECTION, POWDER, LYOPHILIZED, FOR SOLUTION INTRAVENOUS at 00:15

## 2022-07-20 RX ADMIN — PIPERACILLIN AND TAZOBACTAM 200 GRAM(S): 4; .5 INJECTION, POWDER, LYOPHILIZED, FOR SOLUTION INTRAVENOUS at 23:35

## 2022-07-20 RX ADMIN — ENOXAPARIN SODIUM 30 MILLIGRAM(S): 100 INJECTION SUBCUTANEOUS at 18:17

## 2022-07-20 RX ADMIN — PIPERACILLIN AND TAZOBACTAM 200 GRAM(S): 4; .5 INJECTION, POWDER, LYOPHILIZED, FOR SOLUTION INTRAVENOUS at 12:35

## 2022-07-20 RX ADMIN — Medication 3 MILLILITER(S): at 09:12

## 2022-07-20 NOTE — PROGRESS NOTE ADULT - PROBLEM SELECTOR PLAN 4
No hx of COPD or lung disease, not on home O2. Initially placed on 15 L NRB then transitioned to 5L NC sat 99%. No accessory muscle use or incr WOB. CXR with elevated L hemidiaphragm, possibly positional, no acute infiltrates. CXR with no acute infiltrates however given new hypoxia and AMS, must consider aspiration PNA.   - duoneb q6hr  - continue to wean O2, currently on 2L NC Hx CVA. Home med ASA, plavix. Non verbal bedbound at baseline. Ongoing goals of care conversations with sister health care proxy.  - home meds: asa 81mg & plavix (hold, NPO)  - palliative consult  - neuro consult

## 2022-07-20 NOTE — PROGRESS NOTE ADULT - PROBLEM SELECTOR PLAN 2
.  per chart review and HHA, pt w hx of asp with PO in weeks prior to admission.   - SS: Pt with no attempt at bolus formation or manipulation, mouth remained open. NPO.  - pending fu SS today  - would not recommend PEG as this will not improve QOL or prognosis for this patient   - recommend allowing food for comfort w texture recs per SS if pt is awake, willing to eat

## 2022-07-20 NOTE — PROGRESS NOTE ADULT - PROBLEM SELECTOR PLAN 8
Hx MDD on bupropion 150mg XL  - hold po meds, NPO  - resume when mental status improves Known hx of anemia (baseline 2021 9.8). Iron studies in 03/2021 showed Fe borderline low at 29; TIBC low at 184. No signs of active bleeding, no melena. Per allscripts, taking ferrous sulfate 01/2022 but per formal med rec with HHA no longer taking.  - monitor H/H  - active T&S  - transfuse Hb <7.0

## 2022-07-20 NOTE — PROGRESS NOTE ADULT - SUBJECTIVE AND OBJECTIVE BOX
SUBJECTIVE: pt seen and examined. noted to be more alert compared to previous exam, intermittently tracks. nonverbal, unable to provide ROS. per nursing, increased secretions in the last several days.     Reviewed ACP and GOC with pts sister as further noted below.     If  [ ] blank, symptom not present  Dyspnea    [ ]  Anxiety      [ ]  Fatigue     [ ]  Nausea      [ ]  Loss of appetite [ ]  Constipation [ ]  Grief Present                    [ ] Yes   [ ] No     Pain AD score: 0     Unable to obtain ROS due to pt mentation.       PEx:  T(C): 36.6 (07-20-22 @ 12:30), Max: 36.7 (07-19-22 @ 21:34)  HR: 83 (07-20-22 @ 12:30) (73 - 83)  BP: 145/73 (07-20-22 @ 12:30) (145/73 - 169/77)  RR: 16 (07-20-22 @ 12:30) (16 - 18)  SpO2: 97% (07-20-22 @ 12:30) (95% - 97%)  Wt(kg): --      General: frail elderly woman more alert, oriented x 0, appears overtly comfortable, non verbal   HEENT: atraumatic, No abnormal lesions, +dry mouth,  +temporal wasting,  RESP: Reg rhythm, No  tachypnea/labored breathing,  +audible excessive secretions, diminished bilat bases  CV: RRR, S1S2, No    tachycardia  GI: soft non distended non tender incontinent   : incontinent   MUSK: weakness x4,  fully  WC bound  SKIN: No abnormal skin lesions, Poor skin turgor  NEURO:  +cognitive impairment +contracted upper and lower extremities  + dsyphagia   Oral intake ability: unable/only mouth care    ALLERGIES: morphine (Other)      MEDICATIONS: REVIEWED  MEDICATIONS  (STANDING):  dextrose 5% + lactated ringers. 1000 milliLiter(s) (80 mL/Hr) IV Continuous <Continuous>  dextrose 5% + lactated ringers. 1000 milliLiter(s) (80 mL/Hr) IV Continuous <Continuous>  enoxaparin Injectable 30 milliGRAM(s) SubCutaneous every 24 hours  piperacillin/tazobactam IVPB.. 3.375 Gram(s) IV Intermittent every 6 hours    MEDICATIONS  (PRN):      LABS: REVIEWED  CBC:                        8.7    7.73  )-----------( 298      ( 20 Jul 2022 06:06 )             27.3     CMP:    07-20    137  |  100  |  8   ----------------------------<  142<H>  3.6   |  28  |  0.54    Ca    9.2      20 Jul 2022 06:06  Phos  3.1     07-20  Mg     1.7     07-20        RADIOLOGY & ADDITIONAL STUDIES:     PROTEIN CALORIE MALNUTRITION PRESENT: alb 4.0 -> today 3.5   [ ]mild [ ]moderate []severe [ ]underweight [ ]morbid obesity  [ ] PPSV2 < or = 30%   [ ] significant weight loss   [ ] poor nutritional intake   [ ] anasarca   [ ] catabolic state      [ ] Artificial Nutrition     PSYCHOSOCIAL ASSESSMENT:  - Significant other/partner: [  ]  Children: [  ]  - Living Situation: Home [ x ] Long term care [  ]  Rehab[  ]  Other[  ]  - Evangelical/Spiritual practice: [employer]   - Coping: well[ ]   with difficulty[ ]   poor coping[ ] unable to assess dt pt mentation [x ]  - lives at home w 24/7 HHA    ADVANCE DIRECTIVES:    - MOLST[ x ] Incomplete DNR molst in patient window  Living Will [  ]   DECISION MAKER(s):  - Health Care Proxy(s) [  ]  Surrogate(s)[  ] Guardian[  ]    PoA [x]  Name(s)/Phone Number(s):   - reported PoA is sister Talita Thomas [phone]       DISCUSSION OF CASE:  - dw Dr. Toussaint, Dr. Sequeira, w pts HCP and sister, Talita

## 2022-07-20 NOTE — CONSULT NOTE ADULT - ATTENDING COMMENTS
acute (UTI +/- PNA) on chronic (late stage vascular dementia) encephalopathy. last week she was swallowing without issue and initiating chewing until she worsened in the setting of UTI and possible PNA.  It is likely that she will start to initiate swallowing again with treatment of underlying medical conditions and waiting.  it is common for patients with dementia to take longer to return ot baseline mental status after infections are treated so this could take weeks.    in this state temporary NGT is indicated to nourish her while getting treatment for acute condition to see if she improves  would hold off on considering PEG tube until we see that mental status is not improving

## 2022-07-20 NOTE — SWALLOW BEDSIDE ASSESSMENT ADULT - ORAL PHASE
Mouth remained wide open. No coordinated oral movements noted.
Decreased anterior-posterior movement of the bolus

## 2022-07-20 NOTE — PROGRESS NOTE ADULT - PROBLEM SELECTOR PLAN 9
Hx DM, no currently on any medication. A1C 4.8 (7/17/2022).  - NPO   - SSI when tolerating PO Hx MDD.  - home meds: bupropion 150mg XL (hold, NPO)

## 2022-07-20 NOTE — PROGRESS NOTE ADULT - PROBLEM SELECTOR PLAN 5
Presenting with troponin 0.02, unable to assess if patient is having chest pain. EKG x2 normal, no ST elevations. Repeat troponin 0.02.  - other cardiac enzymes WNL: , CKMB 1.9   - if up trending, repeat EKG *RESOLVED  No hx of COPD or lung disease, not on home O2. Initially placed on 15 L NRB then transitioned to 5L NC sat 99%. No accessory muscle use or incr WOB. CXR with elevated L hemidiaphragm, possibly positional, no acute infiltrates. CXR with no acute infiltrates, though consider aspiration PNA given dysphagia.  Pt O2 sat well on room air.  - d/c duoneb q6hr  - monitor O2 saturations  - RVP neg

## 2022-07-20 NOTE — PROGRESS NOTE ADULT - PROBLEM SELECTOR PLAN 2
UA with pyuria, bacteruria, LE, nitrites. Unable to assess if patient symptomatic however more altered/lethargic from baseline on admission. No leukocytosis, negative lactate, not meeting SIRS criteria.   - s/p zosyn, s/p CTX  - restart zosyn 3.375g q6 IV for Pseudomonal coverage (start date 7/18)    - urine cx: Pseudomonas (prelim)  - blood cx: gram pos cocci in clusters, PCR: coag neg  - Ustrep neg, U legionella pending  - RVP neg UA with pyuria, bacteruria, LE, nitrites. Unable to assess if patient symptomatic however more altered/lethargic from baseline on admission. No leukocytosis, negative lactate, not meeting SIRS criteria.   - s/p zosyn, s/p CTX 1g  - zosyn 3.375g q6 IV for Pseudomonal coverage (start date 7/18)  - d/c callejas    - UCx: Pseudomonas  - BCx: gram + cocci in clusters, PCR coag neg  - BCx repeat: no growth 12hr  - Ustrep neg, U legionella pending

## 2022-07-20 NOTE — PROGRESS NOTE ADULT - PROBLEM SELECTOR PLAN 6
.  - PoA is pts 92 yo sister Talita Thomas #[phone]  - DNR DNI MOLST completed and placed in physical chart   - Reached out to Talita for ongoing GOC discussions and support. She appears to understand that without adequate nutrition/improvement in PO intake, her sister is likely in the last weeks of her life  - Talita would like neuro to evaluate pt for prognostication and long term feeding recommendations. She restated today that she would not elect to have PEG placed, but also doesn't feel qualified to make this decision.    In addition to the E/M visit, an advance care planning meeting was performed. Start time: 1100; End time: 1130 Total time: 30 min. A TC meeting to discuss advance care planning was held today regarding: ADRIANNE OMALLEY. Primary decision maker: Patient is unable to participate in decision making; Alternate/surrogate: sisterTalita  Discussed advance directives including, but not limited to, healthcare proxy and code status. Decision regarding code status: DNR/DNI, No Peg Documentation completed today:  GOC note, SANDIE

## 2022-07-20 NOTE — CONSULT NOTE ADULT - SUBJECTIVE AND OBJECTIVE BOX
NEUROLOGY CONSULT    HPI:  89 yo with ho of HTN, HLD, CVA, non verbal / cognitive impairment, bedbound, lives at home with help of home health aide brought in after being found altered by HHA. Per HHA and family friend, patient is minimally verbal at baseline, can answer yes/no questions and sometimes string 2-3 words together. Since thursday, noticed patient was congestion and coughing, with decr PO intake and alertness. Notified outpatient PCP Dr. Madison Winters and was given robitussin. No obvious episodes of choking/aspirating on food. Normally eats regular gluten free diet, but since thursday only would eat food pureed by . No fever, chills, SOB, melena, hematuria per HHA. Brought pt to ED because home pulse ox showed o2 at 85%. Attempted to obtain ROS from patient but would not participate. Per family friend, patient sometimes chooses not to respond.     Reason for neurology consult: AMS, dysphagia prognosis       MEDICATIONS  Home Medications:  amLODIPine 10 mg oral tablet: 1 tab(s) orally once a day (17 Jul 2022 15:26)  ATORVASTATIN CALCIUM  40 MG TABS:  (17 Jul 2022 15:26)  BUPROPION HYDROCHLORIDE ER (XL)  150 MG TB24:  (17 Jul 2022 15:26)  CLOPIDOGREL  75 MG TABS:  (17 Jul 2022 15:26)  FAMOTIDINE  20 MG TABS:  (17 Jul 2022 15:26)  GABAPENTIN  100 MG CAPS:  (17 Jul 2022 15:26)  Ocuvite oral tablet: 1 tab(s) orally once a day (17 Jul 2022 15:26)  ramipril 10 mg oral tablet: 2 tab(s) orally once a day (17 Jul 2022 15:26)  SENNA: GIVE 1 TABLET BY MOUTH EVERY 12 HOURS AS NEEDED FOR CONSTIPATION (17 Jul 2022 15:26)  Vitamin D3 400 intl units (10 mcg) oral tablet:  (17 Jul 2022 15:26)    MEDICATIONS  (STANDING):  dextrose 5% + lactated ringers. 1000 milliLiter(s) (80 mL/Hr) IV Continuous <Continuous>  dextrose 5% + lactated ringers. 1000 milliLiter(s) (80 mL/Hr) IV Continuous <Continuous>  enoxaparin Injectable 30 milliGRAM(s) SubCutaneous every 24 hours  piperacillin/tazobactam IVPB.. 3.375 Gram(s) IV Intermittent every 6 hours    MEDICATIONS  (PRN):      FAMILY HISTORY:    SOCIAL HISTORY: negative for tobacco, alcohol, or ilicit drug use.    Allergies    morphine (Other)    Intolerances        GEN: NAD, frail lady, mouth is wide open with noisy breathing    NEURO:   MENTAL STATUS: AAOx0, lethargic but easily accusable to verbal stimuli, periodically able to follow simple commands  LANG/SPEECH: non verbal  CRANIAL NERVES:  II: Pupils equal and reactive, no RAPD  III, IV, VI: EOM intact, , gaze preference to right  V: normal  VII: no facial asymmetry  VIII: can hear and follow some commands  MOTOR: 4/5 in both upper, 1/5 lower extremities  REFLEXES: 1/4 throughout, bilateral flexor plantars  SENSORY: Unable to assess.   COORD: unable to assess    LABS:                        8.7    7.73  )-----------( 298      ( 20 Jul 2022 06:06 )             27.3     07-20    137  |  100  |  8   ----------------------------<  142<H>  3.6   |  28  |  0.54    Ca    9.2      20 Jul 2022 06:06  Phos  3.1     07-20  Mg     1.7     07-20      Hemoglobin A1C:   Vitamin B12     CAPILLARY BLOOD GLUCOSE      POCT Blood Glucose.: 134 mg/dL (20 Jul 2022 11:48)              Microbiology:    Culture - Blood (collected 19 Jul 2022 15:32)  Source: .Blood Blood  Preliminary Report (20 Jul 2022 05:00):    No growth at 12 hours        RADIOLOGY, EKG AND ADDITIONAL TESTS: Reviewed.           NEUROLOGY CONSULT    HPI:  87 yo with ho of HTN, HLD, CVA, non verbal / cognitive impairment, bedbound, lives at home with help of home health aide brought in after being found altered by HHA. Per HHA and family friend, patient is minimally verbal at baseline, can answer yes/no questions and sometimes string 2-3 words together. Since thursday, noticed patient was congestion and coughing, with decr PO intake and alertness. Notified outpatient PCP Dr. Madison Winters and was given robitussin. No obvious episodes of choking/aspirating on food. Normally eats regular gluten free diet, but since thursday only would eat food pureed by . No fever, chills, SOB, melena, hematuria per HHA. Brought pt to ED because home pulse ox showed o2 at 85%. Attempted to obtain ROS from patient but would not participate. Per family friend, patient sometimes chooses not to respond.     Reason for neurology consult: AMS, dysphagia prognosis       MEDICATIONS  Home Medications:  amLODIPine 10 mg oral tablet: 1 tab(s) orally once a day (17 Jul 2022 15:26)  ATORVASTATIN CALCIUM  40 MG TABS:  (17 Jul 2022 15:26)  BUPROPION HYDROCHLORIDE ER (XL)  150 MG TB24:  (17 Jul 2022 15:26)  CLOPIDOGREL  75 MG TABS:  (17 Jul 2022 15:26)  FAMOTIDINE  20 MG TABS:  (17 Jul 2022 15:26)  GABAPENTIN  100 MG CAPS:  (17 Jul 2022 15:26)  Ocuvite oral tablet: 1 tab(s) orally once a day (17 Jul 2022 15:26)  ramipril 10 mg oral tablet: 2 tab(s) orally once a day (17 Jul 2022 15:26)  SENNA: GIVE 1 TABLET BY MOUTH EVERY 12 HOURS AS NEEDED FOR CONSTIPATION (17 Jul 2022 15:26)  Vitamin D3 400 intl units (10 mcg) oral tablet:  (17 Jul 2022 15:26)    MEDICATIONS  (STANDING):  dextrose 5% + lactated ringers. 1000 milliLiter(s) (80 mL/Hr) IV Continuous <Continuous>  dextrose 5% + lactated ringers. 1000 milliLiter(s) (80 mL/Hr) IV Continuous <Continuous>  enoxaparin Injectable 30 milliGRAM(s) SubCutaneous every 24 hours  piperacillin/tazobactam IVPB.. 3.375 Gram(s) IV Intermittent every 6 hours    MEDICATIONS  (PRN):      FAMILY HISTORY:    SOCIAL HISTORY: negative for tobacco, alcohol, or ilicit drug use.    Allergies    morphine (Other)    Intolerances        GEN: NAD, frail lady, mouth is wide open with noisy breathing    NEURO:   MENTAL STATUS: AAOx0, lethargic but easily accusable to verbal stimuli, periodically able to follow simple commands  LANG/SPEECH: non verbal  CRANIAL NERVES:  II: Pupils equal and reactive, no RAPD  III, IV, VI: EOM intact, , gaze preference to right  V: normal  VII: no facial asymmetry  VIII: can hear and follow some commands  MOTOR: 4/5 in both upper with hypertonus L>R, 1/5 lower extremities  REFLEXES: 1/4 throughout, bilateral flexor plantars  SENSORY: Unable to assess.   COORD: unable to assess    LABS:                        8.7    7.73  )-----------( 298      ( 20 Jul 2022 06:06 )             27.3     07-20    137  |  100  |  8   ----------------------------<  142<H>  3.6   |  28  |  0.54    Ca    9.2      20 Jul 2022 06:06  Phos  3.1     07-20  Mg     1.7     07-20      Hemoglobin A1C: 4.8  Vitamin B12     CAPILLARY BLOOD GLUCOSE      POCT Blood Glucose.: 134 mg/dL (20 Jul 2022 11:48)              Microbiology:    Culture - Blood (collected 19 Jul 2022 15:32)  Source: .Blood Blood  Preliminary Report (20 Jul 2022 05:00):    No growth at 12 hours        RADIOLOGY, EKG AND ADDITIONAL TESTS: Reviewed.

## 2022-07-20 NOTE — PROGRESS NOTE ADULT - PROBLEM SELECTOR PLAN 11
F: NC 80cc/hr  E: replete to K>4, Mg>2, Phos>2.5  N: NPO pending mental status improvement/dysphagia screen  Ppx: lovenox 30mg qD    Code Status: DNR - MOLST in chart     Dispo: [facility] F: D5 in LR 80cc/hr while NPO  E: replete to K>4, Mg>2, Phos>2.5  N: NPO pending mental status improvement/dysphagia screen  Ppx: lovenox 30mg qD    Code Status: DNR - MOLST in chart     Dispo: home vs home w/ hospice

## 2022-07-20 NOTE — PROGRESS NOTE ADULT - PROBLEM SELECTOR PLAN 5
.  functional and cognitive baselines prior to admission: non verbal, total care, bed bound, full feed, aspirating.   - hospice eligible

## 2022-07-20 NOTE — PROGRESS NOTE ADULT - ASSESSMENT
88F with multiple CVA, HTN, HLD presented 7/17 from home with altered mental status and failure to thrive. +Uti. Oropharyngeal dysphagia. Palliative care following for GOC.     ·	Reported PoA is 90 yo sister Talita Thomas #[phone]  ·	DNR DNI MOLST completed and placed in physical chart   ·	Pt pending fu SS evaluation. Reached out to Talita for ongoing GOC discussions. She appears to understand that without adequate nutrition/improvement in PO intake, her sister is likely in the last weeks of her life, however discussions of disease trajectory and introduction of home hospice were limited.  ·	Talita would like neuro to evaluate pt for prognostication and long term feeding recommendations. She restated today that she would not elect to have PEG placed, but also doesn't feel qualified to make this decision.  ·	Dispo home, resume 24/7 HHA, +/- hospice pending goc

## 2022-07-20 NOTE — PROGRESS NOTE ADULT - PROBLEM SELECTOR PLAN 7
Hx CVA. Home med ASA, plavix. non verbal bedbound at baseline  - hold meds for now given AMS  - c/w asa 81mg & plavix after dysphagia screen  - palliative consult Presenting with troponin 0.02, unable to assess if patient is having chest pain. EKG x2 normal, no ST elevations. Repeat troponin 0.02.  - other cardiac enzymes WNL: , CKMB 1.9   - if up trending, repeat EKG

## 2022-07-20 NOTE — CONSULT NOTE ADULT - ASSESSMENT
89 yo F with ho of multiple CVAs, dementia, HTN, HLD, DM brought in by HHA after being found altered and decreased PO intake, found to have UTI and new O2 requirements.   He baseline (per HHA):  bed bound w/ 24/7 HHA, non verbal with only "yes" and "no", was able to follow very simple verbal commands, eating and swallowing well. On today's exam she is bed-bound, frail lady with temporal wasting, lethargic but arousable on verbal stimuli, non-verbal but follows simple verbal commands periodically, moderate to severe muscle contractions x 4 with limited ROM in UE and LE, gaze preference to R. Per official SLP evaluation, she's not able to initiate swallowing which may be a sign of end-stage of dementia. Considering multiple CVA, active UTI on ABx, advanced age, bedbound state with muscle contractions, severe and end-stage dementia without initiating swallowing she is and candidate for hospice care. Even with PEG tube it's doubtful that her dementia will be reversed up to point of swallowing again.     Recommendation:     1. discuss GOC w family  2. Hospice care      The case was discussed with general neurology attending          87 yo F with ho of multiple CVAs, dementia, HTN, HLD, DM brought in by HHA after being found altered and decreased PO intake, found to have UTI and new O2 requirements.   He baseline (per HHA):  bed bound w/ 24/7 HHA, non verbal with only "yes" and "no", was able to follow very simple verbal commands, eating and swallowing well. On today's exam she is bed-bound, frail lady with temporal wasting, lethargic but arousable on verbal stimuli, non-verbal but follows simple verbal commands periodically, moderate to severe muscle contractions x 4 with limited ROM in UE and LE, gaze preference to R. Per official SLP evaluation, she's not able to initiate swallowing. Considering acute onset of dysphagia on the settings of active UTI and a Hx of multiple CVA, bedbound state with muscle contractions, severe dementia she is currently encephalopathic due to active UTI and with successful treatment her active infection, there's a chance of returning her mentation to baseline and start swallowing again.      Recommendation:     1. c/w UTI tx  2. discuss GOC w family      The note is not active w/o confirmation of attending MD.

## 2022-07-20 NOTE — SWALLOW BEDSIDE ASSESSMENT ADULT - PHARYNGEAL PHASE
No swallow palpated. Water sat in anterior oral cavity for more than 1 minute despite verbal/tactile prompts.
Absent trigger of swallow

## 2022-07-20 NOTE — PROGRESS NOTE ADULT - PROBLEM SELECTOR PLAN 1
Pt s/p CVA, baseline non verbal w/ cognitive impairment and bedbound. Per HHA has been more altered/lethargic from baseline for few days with decreased PO intake likely 2/2 UTI (UA with pyuria, bacteriuria). UCx growing Pseudomonas.   Pt also presented with hypoxia, raising c/f aspiration PNA, however CXR with no acute infiltrates and pt improving with decreased O2 requirements.  Mental status slightly improved from yesterday.  - UTI tx per below  - hypoxia tx per below    - UCx: Pseudomonas  - BCx: gram + cocci in clusters, PCR coag neg Pt s/p CVA, baseline non verbal w/ cognitive impairment and bedbound. Per HHA has been more altered/lethargic from baseline for few days with decreased PO intake likely 2/2 UTI (UA with pyuria, bacteriuria). UCx growing Pseudomonas.   - UTI tx per below

## 2022-07-20 NOTE — PROGRESS NOTE ADULT - SUBJECTIVE AND OBJECTIVE BOX
SUBJECTIVE / INTERVAL HPI: No overnight events. Pt continues to have secretions. Mental status similar to previous days on admission with intermittent responsiveness (yes/no) questions and intermittent following commands. HHA at bedside states no additional events.    VITAL SIGNS:  Vital Signs Last 24 Hrs  T(C): 36.6 (20 Jul 2022 12:30), Max: 36.7 (19 Jul 2022 21:34)  T(F): 97.9 (20 Jul 2022 12:30), Max: 98 (19 Jul 2022 21:34)  HR: 83 (20 Jul 2022 12:30) (73 - 83)  BP: 145/73 (20 Jul 2022 12:30) (145/73 - 169/77)  BP(mean): --  RR: 16 (20 Jul 2022 12:30) (16 - 18)  SpO2: 97% (20 Jul 2022 12:30) (95% - 97%)    Parameters below as of 20 Jul 2022 12:30  Patient On (Oxygen Delivery Method): room air        PHYSICAL EXAM:    General: NAD, cachetic, bedbound, nonverbal, intermittently responsive  HEENT: NC/AT; anicteric sclera; dry mucus membranes  Neck: stiff  Cardiovascular: RRR; holosystolic murmur  Respiratory: CTA B/L anteriorly; no increased work of breathing; mouth breather  Gastrointestinal: soft, NT/ND; +BSx4  Extremities: WWP; no edema  Vascular: 2+ radial, DP pulses  Neurological: AAOx0; bedbound; intermittently responsive to commands; mumbling and intermittently responding to yes no questions    MEDICATIONS:  MEDICATIONS  (STANDING):  dextrose 5% + lactated ringers. 1000 milliLiter(s) (80 mL/Hr) IV Continuous <Continuous>  dextrose 5% + lactated ringers. 1000 milliLiter(s) (80 mL/Hr) IV Continuous <Continuous>  enoxaparin Injectable 30 milliGRAM(s) SubCutaneous every 24 hours  piperacillin/tazobactam IVPB.. 3.375 Gram(s) IV Intermittent every 6 hours    MEDICATIONS  (PRN):      ALLERGIES:  Allergies    morphine (Other)    Intolerances        LABS:                        8.7    7.73  )-----------( 298      ( 20 Jul 2022 06:06 )             27.3     07-20    137  |  100  |  8   ----------------------------<  142<H>  3.6   |  28  |  0.54    Ca    9.2      20 Jul 2022 06:06  Phos  3.1     07-20  Mg     1.7     07-20          CAPILLARY BLOOD GLUCOSE      POCT Blood Glucose.: 134 mg/dL (20 Jul 2022 11:48)      RADIOLOGY & ADDITIONAL TESTS: Reviewed.

## 2022-07-20 NOTE — PROGRESS NOTE ADULT - PROBLEM SELECTOR PLAN 7
.  If pt without adequate improvement of mentation and subsequent PO intake, pt likely in the last weeks of life and would meet home hospice criteria. Dispo home, resume 24/7 HHA, +/- hospice pending further discussion of GOC. SANDIE placed in chart.     Active Psychosocial Referrals:   SW/CM[  x] PT/OT[  ] Chaplaincy[   ]Hospice[  ]  Ethics[  ]  Dian Marcus Patient/Family Support[  ] Holistic RN[  ] Massage Therapy[  ]    Coping:  well[  ] with difficulty[  ] poor coping[  ] unable to assess[ x ]   Support system: strong[  ] adequate[x  ] inadequate[  ]    - All questions answered, emotional support provided to Talita franco   -  primary team, Dr. Sequeira, Dr. Toussaint  - For new or uncontrolled symptoms, please call Palliative Care at [phone]. The service is available 24/7 (including nights & weekends) to provide symptom management recommendations over the phone as appropriate  - Will continue to follow with you.

## 2022-07-20 NOTE — PROGRESS NOTE ADULT - PROBLEM SELECTOR PLAN 3
Pt with decreased PO intake per HHA. On speech and swallow evaluation, pt not able to swallow. Pt currently NPO. Pt w/ increased oral secretions today.  - NPO, aspiration precautions  - no tube feeds for now, per sister (health care proxy)  - continue D5 LR 80mL/hr  - POCT q6  - nutrition consult Pt with decreased PO intake per HHA. On initial speech and swallow evaluation, pt not able to swallow. Pt currently NPO. Pt w/ continued increased oral secretions today.  - f/u speech recs  - NPO, aspiration precautions  - no tube feeds for now, per sister (health care proxy)  - continue D5 LR 80mL/hr  - POCT q6  - nutrition consult

## 2022-07-20 NOTE — PROGRESS NOTE ADULT - PROBLEM SELECTOR PLAN 3
.  hx CVA non verbal, total care at baseline. hospital course cb toxic metabolic encephalopathy 2/2 UTI. Pt remains unable to participate in interview, but is more alert compared to admission, intermittently tracking  - Risk for delirium, reorient patient and reinforce pts safety   - Avoid benzos, sedating and anticholinergic medications

## 2022-07-20 NOTE — PROGRESS NOTE ADULT - PROBLEM SELECTOR PLAN 6
Known hx of anemia (baseline 2021 9.8). Iron studies in 03/2021 showed Fe borderline low at 29; TIBC low at 184. No signs of active bleeding, no melena. Per allscripts, taking ferrous sulfate 01/2022 but per formal med rec with HHA no longer taking.  - monitor H/H  - active T&S  - transfuse Hb <7.0 Hx HTN. Blood pressures elevated during admission.  - home meds: amlodipine 10mg, ramipril 20mg qd (hold, NPO)  - consider hydral 5mg IV if BPs >180/100s    #HLD  - home meds: lipitor 40 (hold, NPO)

## 2022-07-20 NOTE — PROGRESS NOTE ADULT - PROBLEM SELECTOR PLAN 10
Hx HTN. Home amlodipine 10mg, ramipril 20mg qd   - hold for now given NPO    #HLD: lipitor 40  - hold for now given NPO Hx DM, no currently on any medication. A1C 4.8 (7/17/2022).  - NPO   - SSI when tolerating PO

## 2022-07-20 NOTE — SWALLOW BEDSIDE ASSESSMENT ADULT - SWALLOW EVAL: DIAGNOSIS
No change in pt presentation from last evaluation on 7/18 from this [facility]. Pt p/w severe oral-pharyngeal dysphagia. Given absent recognition of PO in oral cavity and absent swallow initiation, cannot recommend a PO diet at this time. Recommend frequent moist oral swabs for pleasure/comfort.

## 2022-07-20 NOTE — PROGRESS NOTE ADULT - ASSESSMENT
87 yo with ho of HTN, HLD, multiple CVAs (bed bound w/ 24/7 HHA, non verbal, cognitive impairment) brought in after being found altered and decreased PO intake, found to have UTI and new O2 requirements.

## 2022-07-21 LAB
-  CLINDAMYCIN: SIGNIFICANT CHANGE UP
-  CLINDAMYCIN: SIGNIFICANT CHANGE UP
-  ERYTHROMYCIN: SIGNIFICANT CHANGE UP
-  ERYTHROMYCIN: SIGNIFICANT CHANGE UP
-  LINEZOLID: SIGNIFICANT CHANGE UP
-  LINEZOLID: SIGNIFICANT CHANGE UP
-  OXACILLIN: SIGNIFICANT CHANGE UP
-  OXACILLIN: SIGNIFICANT CHANGE UP
-  RIFAMPIN: SIGNIFICANT CHANGE UP
-  RIFAMPIN: SIGNIFICANT CHANGE UP
-  TRIMETHOPRIM/SULFAMETHOXAZOLE: SIGNIFICANT CHANGE UP
-  TRIMETHOPRIM/SULFAMETHOXAZOLE: SIGNIFICANT CHANGE UP
-  VANCOMYCIN: SIGNIFICANT CHANGE UP
-  VANCOMYCIN: SIGNIFICANT CHANGE UP
ANION GAP SERPL CALC-SCNC: 9 MMOL/L — SIGNIFICANT CHANGE UP (ref 5–17)
BASOPHILS # BLD AUTO: 0.06 K/UL — SIGNIFICANT CHANGE UP (ref 0–0.2)
BASOPHILS NFR BLD AUTO: 0.9 % — SIGNIFICANT CHANGE UP (ref 0–2)
BUN SERPL-MCNC: 5 MG/DL — LOW (ref 7–23)
CALCIUM SERPL-MCNC: 9.1 MG/DL — SIGNIFICANT CHANGE UP (ref 8.4–10.5)
CHLORIDE SERPL-SCNC: 100 MMOL/L — SIGNIFICANT CHANGE UP (ref 96–108)
CO2 SERPL-SCNC: 27 MMOL/L — SIGNIFICANT CHANGE UP (ref 22–31)
CREAT SERPL-MCNC: 0.56 MG/DL — SIGNIFICANT CHANGE UP (ref 0.5–1.3)
CULTURE RESULTS: SIGNIFICANT CHANGE UP
CULTURE RESULTS: SIGNIFICANT CHANGE UP
EGFR: 88 ML/MIN/1.73M2 — SIGNIFICANT CHANGE UP
EOSINOPHIL # BLD AUTO: 0.11 K/UL — SIGNIFICANT CHANGE UP (ref 0–0.5)
EOSINOPHIL NFR BLD AUTO: 1.7 % — SIGNIFICANT CHANGE UP (ref 0–6)
GLUCOSE BLDC GLUCOMTR-MCNC: 123 MG/DL — HIGH (ref 70–99)
GLUCOSE BLDC GLUCOMTR-MCNC: 132 MG/DL — HIGH (ref 70–99)
GLUCOSE BLDC GLUCOMTR-MCNC: 163 MG/DL — HIGH (ref 70–99)
GLUCOSE BLDC GLUCOMTR-MCNC: 163 MG/DL — HIGH (ref 70–99)
GLUCOSE SERPL-MCNC: 131 MG/DL — HIGH (ref 70–99)
HCT VFR BLD CALC: 30.1 % — LOW (ref 34.5–45)
HGB BLD-MCNC: 9.5 G/DL — LOW (ref 11.5–15.5)
IMM GRANULOCYTES NFR BLD AUTO: 0.3 % — SIGNIFICANT CHANGE UP (ref 0–1.5)
LYMPHOCYTES # BLD AUTO: 1.6 K/UL — SIGNIFICANT CHANGE UP (ref 1–3.3)
LYMPHOCYTES # BLD AUTO: 24.7 % — SIGNIFICANT CHANGE UP (ref 13–44)
MAGNESIUM SERPL-MCNC: 1.9 MG/DL — SIGNIFICANT CHANGE UP (ref 1.6–2.6)
MCHC RBC-ENTMCNC: 26.8 PG — LOW (ref 27–34)
MCHC RBC-ENTMCNC: 31.6 GM/DL — LOW (ref 32–36)
MCV RBC AUTO: 85 FL — SIGNIFICANT CHANGE UP (ref 80–100)
METHOD TYPE: SIGNIFICANT CHANGE UP
METHOD TYPE: SIGNIFICANT CHANGE UP
MONOCYTES # BLD AUTO: 0.66 K/UL — SIGNIFICANT CHANGE UP (ref 0–0.9)
MONOCYTES NFR BLD AUTO: 10.2 % — SIGNIFICANT CHANGE UP (ref 2–14)
NEUTROPHILS # BLD AUTO: 4.03 K/UL — SIGNIFICANT CHANGE UP (ref 1.8–7.4)
NEUTROPHILS NFR BLD AUTO: 62.2 % — SIGNIFICANT CHANGE UP (ref 43–77)
NRBC # BLD: 0 /100 WBCS — SIGNIFICANT CHANGE UP (ref 0–0)
ORGANISM # SPEC MICROSCOPIC CNT: SIGNIFICANT CHANGE UP
PHOSPHATE SERPL-MCNC: 2.3 MG/DL — LOW (ref 2.5–4.5)
PLATELET # BLD AUTO: 275 K/UL — SIGNIFICANT CHANGE UP (ref 150–400)
POTASSIUM SERPL-MCNC: 3.6 MMOL/L — SIGNIFICANT CHANGE UP (ref 3.5–5.3)
POTASSIUM SERPL-SCNC: 3.6 MMOL/L — SIGNIFICANT CHANGE UP (ref 3.5–5.3)
RBC # BLD: 3.54 M/UL — LOW (ref 3.8–5.2)
RBC # FLD: 14.7 % — HIGH (ref 10.3–14.5)
SODIUM SERPL-SCNC: 136 MMOL/L — SIGNIFICANT CHANGE UP (ref 135–145)
SPECIMEN SOURCE: SIGNIFICANT CHANGE UP
SPECIMEN SOURCE: SIGNIFICANT CHANGE UP
WBC # BLD: 6.48 K/UL — SIGNIFICANT CHANGE UP (ref 3.8–10.5)
WBC # FLD AUTO: 6.48 K/UL — SIGNIFICANT CHANGE UP (ref 3.8–10.5)

## 2022-07-21 PROCEDURE — 99232 SBSQ HOSP IP/OBS MODERATE 35: CPT

## 2022-07-21 PROCEDURE — 99232 SBSQ HOSP IP/OBS MODERATE 35: CPT | Mod: GC

## 2022-07-21 RX ORDER — SODIUM CHLORIDE 9 MG/ML
1000 INJECTION, SOLUTION INTRAVENOUS
Refills: 0 | Status: DISCONTINUED | OUTPATIENT
Start: 2022-07-21 | End: 2022-07-22

## 2022-07-21 RX ADMIN — ENOXAPARIN SODIUM 30 MILLIGRAM(S): 100 INJECTION SUBCUTANEOUS at 17:48

## 2022-07-21 RX ADMIN — SODIUM CHLORIDE 40 MILLILITER(S): 9 INJECTION, SOLUTION INTRAVENOUS at 09:32

## 2022-07-21 RX ADMIN — PIPERACILLIN AND TAZOBACTAM 200 GRAM(S): 4; .5 INJECTION, POWDER, LYOPHILIZED, FOR SOLUTION INTRAVENOUS at 13:01

## 2022-07-21 RX ADMIN — PIPERACILLIN AND TAZOBACTAM 200 GRAM(S): 4; .5 INJECTION, POWDER, LYOPHILIZED, FOR SOLUTION INTRAVENOUS at 05:37

## 2022-07-21 RX ADMIN — Medication 85 MILLIMOLE(S): at 13:02

## 2022-07-21 RX ADMIN — PIPERACILLIN AND TAZOBACTAM 200 GRAM(S): 4; .5 INJECTION, POWDER, LYOPHILIZED, FOR SOLUTION INTRAVENOUS at 17:46

## 2022-07-21 NOTE — PROGRESS NOTE ADULT - SUBJECTIVE AND OBJECTIVE BOX
OVERNIGHT EVENTS:     SUBJECTIVE / INTERVAL HPI: Patient seen and examined at bedside.     VITAL SIGNS:  Vital Signs Last 24 Hrs  T(C): 36.4 (21 Jul 2022 05:30), Max: 36.7 (20 Jul 2022 06:40)  T(F): 97.6 (21 Jul 2022 05:30), Max: 98 (20 Jul 2022 06:40)  HR: 75 (21 Jul 2022 05:30) (75 - 83)  BP: 169/75 (21 Jul 2022 05:30) (145/73 - 169/77)  BP(mean): --  RR: 17 (21 Jul 2022 05:30) (16 - 17)  SpO2: 99% (21 Jul 2022 05:30) (95% - 100%)    Parameters below as of 21 Jul 2022 05:30  Patient On (Oxygen Delivery Method): room air        PHYSICAL EXAM:    General: NAD  HEENT: NC/AT; PERRL, anicteric sclera; MMM  Neck: supple  Cardiovascular: +S1/S2; RRR  Respiratory: CTA B/L; no W/R/R  Gastrointestinal: soft, NT/ND; +BSx4  Extremities: WWP; no edema, clubbing or cyanosis  Vascular: 2+ radial, DP/PT pulses B/L  Neurological: AAOx3; no focal deficits    MEDICATIONS:  MEDICATIONS  (STANDING):  dextrose 5% + lactated ringers. 1000 milliLiter(s) (80 mL/Hr) IV Continuous <Continuous>  enoxaparin Injectable 30 milliGRAM(s) SubCutaneous every 24 hours  piperacillin/tazobactam IVPB.. 3.375 Gram(s) IV Intermittent every 6 hours    MEDICATIONS  (PRN):      ALLERGIES:  Allergies    morphine (Other)    Intolerances        LABS:                        8.7    7.73  )-----------( 298      ( 20 Jul 2022 06:06 )             27.3     07-20    137  |  100  |  8   ----------------------------<  142<H>  3.6   |  28  |  0.54    Ca    9.2      20 Jul 2022 06:06  Phos  3.1     07-20  Mg     1.7     07-20          CAPILLARY BLOOD GLUCOSE      POCT Blood Glucose.: 132 mg/dL (21 Jul 2022 05:36)      RADIOLOGY & ADDITIONAL TESTS: Reviewed.   SUBJECTIVE / INTERVAL HPI: Patient seen and examined at bedside. No overnight events. Per HHA at bedside, pt appears to have more energy, describing that she will try to resist suctioning of oral cavity. Pt intermittently responding yes or no to questions. When asked if she was at home, she shook her head no and when asked if she was in the hospital, she also shook her head no. Did not follow commands this morning.    VITAL SIGNS:  Vital Signs Last 24 Hrs  T(C): 36.4 (21 Jul 2022 05:30), Max: 36.7 (20 Jul 2022 06:40)  T(F): 97.6 (21 Jul 2022 05:30), Max: 98 (20 Jul 2022 06:40)  HR: 75 (21 Jul 2022 05:30) (75 - 83)  BP: 169/75 (21 Jul 2022 05:30) (145/73 - 169/77)  BP(mean): --  RR: 17 (21 Jul 2022 05:30) (16 - 17)  SpO2: 99% (21 Jul 2022 05:30) (95% - 100%)    Parameters below as of 21 Jul 2022 05:30  Patient On (Oxygen Delivery Method): room air        PHYSICAL EXAM:    General: thin, cachetic female resting in bed  HEENT: NC/AT; dry MMM (HHA giving moist oral swabs at bedside)  Neck: stiff  Cardiovascular: RRR, significant holosystolic murmur  Respiratory: mouth breather, loud inspiratory and expiratory noises due to mouth breathing but no crackles appreciated  Gastrointestinal: soft, NT/ND; +BSx4  Extremities: warm, no edema  Vascular: 2+ radial, 2+ DP pulses B/L  Neurological: AAOx0; severe contractions in all 4 extremities; answering y/n to questions, though responses not always appropriate; not following commands this morning; increased arousability from admission    MEDICATIONS:  MEDICATIONS  (STANDING):  dextrose 5% + lactated ringers. 1000 milliLiter(s) (80 mL/Hr) IV Continuous <Continuous>  enoxaparin Injectable 30 milliGRAM(s) SubCutaneous every 24 hours  piperacillin/tazobactam IVPB.. 3.375 Gram(s) IV Intermittent every 6 hours    MEDICATIONS  (PRN):      ALLERGIES:  Allergies    morphine (Other)    Intolerances        LABS:                        8.7    7.73  )-----------( 298      ( 20 Jul 2022 06:06 )             27.3     07-20    137  |  100  |  8   ----------------------------<  142<H>  3.6   |  28  |  0.54    Ca    9.2      20 Jul 2022 06:06  Phos  3.1     07-20  Mg     1.7     07-20          CAPILLARY BLOOD GLUCOSE      POCT Blood Glucose.: 132 mg/dL (21 Jul 2022 05:36)      RADIOLOGY & ADDITIONAL TESTS: Reviewed.

## 2022-07-21 NOTE — PROGRESS NOTE ADULT - PROBLEM SELECTOR PLAN 7
Presenting with troponin 0.02, unable to assess if patient is having chest pain. EKG x2 normal, no ST elevations. Repeat troponin 0.02.  - other cardiac enzymes WNL: , CKMB 1.9   - if up trending, repeat EKG

## 2022-07-21 NOTE — PROGRESS NOTE ADULT - PROBLEM SELECTOR PLAN 6
Hx HTN. Blood pressures elevated during admission.  - home meds: amlodipine 10mg, ramipril 20mg qd (hold, NPO)  - consider hydral 5mg IV if BPs >180/100s    #HLD  - home meds: lipitor 40 (hold, NPO)

## 2022-07-21 NOTE — DISCHARGE NOTE PROVIDER - DETAILS OF MALNUTRITION DIAGNOSIS/DIAGNOSES
This patient has been assessed with a concern for Malnutrition and was treated during this hospitalization for the following Nutrition diagnosis/diagnoses:     -  07/18/2022: Severe protein-calorie malnutrition

## 2022-07-21 NOTE — DISCHARGE NOTE PROVIDER - NSDCMRMEDTOKEN_GEN_ALL_CORE_FT
amLODIPine 10 mg oral tablet: 1 tab(s) orally once a day  ATORVASTATIN CALCIUM  40 MG TABS:   BUPROPION HYDROCHLORIDE ER (XL)  150 MG TB24:   CLOPIDOGREL  75 MG TABS:   FAMOTIDINE  20 MG TABS:   GABAPENTIN  100 MG CAPS:   Ocuvite oral tablet: 1 tab(s) orally once a day  ramipril 10 mg oral tablet: 2 tab(s) orally once a day  SENNA: GIVE 1 TABLET BY MOUTH EVERY 12 HOURS AS NEEDED FOR CONSTIPATION  Vitamin D3 400 intl units (10 mcg) oral tablet:    amLODIPine 10 mg oral tablet: 1 tab(s) orally once a day  ATORVASTATIN CALCIUM  40 MG TABS:   bisacodyl 10 mg rectal suppository: 1 suppository(ies) rectal once a day, As needed, Constipation  BUPROPION HYDROCHLORIDE ER (XL)  150 MG TB24:   CLOPIDOGREL  75 MG TABS:   Dextrose 5% in Lactated Ringers intravenous solution:   FAMOTIDINE  20 MG TABS:   GABAPENTIN  100 MG CAPS:   glycopyrrolate 0.2 mg/mL injectable solution: 0.2 milligram(s) intravenous every 6 hours, As Needed  HYDROmorphone: 0.25 milligram(s) intravenously every 4 hours, As Needed  Ocuvite oral tablet: 1 tab(s) orally once a day  ramipril 10 mg oral tablet: 2 tab(s) orally once a day  scopolamine: 1 patch transdermal every 72 hours  SENNA: GIVE 1 TABLET BY MOUTH EVERY 12 HOURS AS NEEDED FOR CONSTIPATION  Vitamin D3 400 intl units (10 mcg) oral tablet:    amLODIPine 10 mg oral tablet: 1 tab(s) orally once a day  ATORVASTATIN CALCIUM  40 MG TABS:   bisacodyl 10 mg rectal suppository: 1 suppository(ies) rectal once a day, As needed, Constipation  BUPROPION HYDROCHLORIDE ER (XL)  150 MG TB24:   CLOPIDOGREL  75 MG TABS:   FAMOTIDINE  20 MG TABS:   GABAPENTIN  100 MG CAPS:   glycopyrrolate 0.2 mg/mL injectable solution: 0.2 milligram(s) intravenous every 6 hours, As Needed  HYDROmorphone: 0.25 milligram(s) intravenously every 4 hours, As Needed  Ocuvite oral tablet: 1 tab(s) orally once a day  ramipril 10 mg oral tablet: 2 tab(s) orally once a day  scopolamine: 1 patch transdermal every 72 hours  SENNA: GIVE 1 TABLET BY MOUTH EVERY 12 HOURS AS NEEDED FOR CONSTIPATION  Vitamin D3 400 intl units (10 mcg) oral tablet:

## 2022-07-21 NOTE — PROGRESS NOTE ADULT - PROBLEM SELECTOR PLAN 8
Known hx of anemia (baseline 2021 9.8). Iron studies in 03/2021 showed Fe borderline low at 29; TIBC low at 184. No signs of active bleeding, no melena. Per allscripts, taking ferrous sulfate 01/2022 but per formal med rec with HHA no longer taking.  - monitor H/H  - active T&S  - transfuse Hb <7.0

## 2022-07-21 NOTE — PROGRESS NOTE ADULT - PROBLEM SELECTOR PLAN 3
Pt with decreased PO intake per HHA. On initial speech and swallow evaluation, pt not able to swallow. Pt currently NPO. Pt w/ continued increased oral secretions today.  - f/u speech recs  - NPO, aspiration precautions  - no tube feeds for now, per sister (health care proxy)  - continue D5 LR 80mL/hr  - POCT q6  - nutrition consult Pt with decreased PO intake per HHA. Speech and swallow evaluation x2, pt not able to initiate swallow. Pt with continued oral secretions, getting suctioned.  - f/u speech recs   - NPO, aspiration precautions  - no tube feeds for now, per sister (health care proxy)  - D5 LR 40mL/hr; monitor volume status  - POCT q6  - nutrition consult

## 2022-07-21 NOTE — PROGRESS NOTE ADULT - ASSESSMENT
89 yo with ho of HTN, HLD, multiple CVAs (bed bound w/ 24/7 HHA, non verbal, cognitive impairment) brought in after being found altered and decreased PO intake, found to have UTI and new O2 requirements.

## 2022-07-21 NOTE — PROGRESS NOTE ADULT - PROBLEM SELECTOR PLAN 11
F: D5 in LR 80cc/hr while NPO  E: replete to K>4, Mg>2, Phos>2.5  N: NPO pending mental status improvement/dysphagia screen  Ppx: lovenox 30mg qD    Code Status: DNR - MOLST in chart     Dispo: home vs home w/ hospice

## 2022-07-21 NOTE — PROGRESS NOTE ADULT - PROBLEM SELECTOR PLAN 2
UA with pyuria, bacteruria, LE, nitrites. Unable to assess if patient symptomatic however more altered/lethargic from baseline on admission. No leukocytosis, negative lactate, not meeting SIRS criteria.   - s/p zosyn, s/p CTX 1g  - zosyn 3.375g q6 IV for Pseudomonal coverage (start date 7/18)  - d/c callejas    - UCx: Pseudomonas  - BCx: gram + cocci in clusters, PCR coag neg  - BCx repeat: no growth 12hr  - Ustrep neg, U legionella pending UA with pyuria, bacteruria, LE, nitrites. Unable to assess if patient symptomatic however more altered/lethargic from baseline on admission. No leukocytosis, negative lactate, not meeting SIRS criteria.   - s/p zosyn, s/p CTX 1g  - zosyn 3.375g q6 IV for Pseudomonal coverage (7/18-7/22)  - pt with good urine output, not retaining per overnight bladder scan    - UCx: Pseudomonas  - BCx: gram + cocci in clusters, PCR coag neg  - BCx repeat: no growth x1day  - Ustrep neg, U legionella pending

## 2022-07-21 NOTE — PROGRESS NOTE ADULT - PROBLEM SELECTOR PLAN 5
*RESOLVED  No hx of COPD or lung disease, not on home O2. Initially placed on 15 L NRB then transitioned to 5L NC sat 99%. No accessory muscle use or incr WOB. CXR with elevated L hemidiaphragm, possibly positional, no acute infiltrates. CXR with no acute infiltrates, though consider aspiration PNA given dysphagia.  Pt O2 sat well on room air.  - d/c duoneb q6hr  - monitor O2 saturations  - RVP neg *RESOLVED  No hx of COPD or lung disease, not on home O2. Initially placed on 15 L NRB then transitioned to 5L NC sat 99%. No accessory muscle use or incr WOB. CXR with elevated L hemidiaphragm, possibly positional, no acute infiltrates. CXR with no acute infiltrates, though consider aspiration PNA given dysphagia.  Pt O2 sat well on room air.  - monitor O2 saturations  - RVP neg

## 2022-07-21 NOTE — DISCHARGE NOTE PROVIDER - ATTENDING DISCHARGE PHYSICAL EXAMINATION:
General: NAD, resting in bed, eyes and mouth open, thin, cachectic   Head: NC/AT, anicteric sclera, dry MM, neck stick, mouth open  Neck: Supple; trachea midline  Respiratory: CTAB anteriorly w/ transmitted upper airways sounds; no wheezes, increased work of breathing, retractions  Cardiovascular: RRR; holosystolic murmur, S1 w/ muted S2, no LE edema  Gastrointestinal: Soft abdomen, bowel sounds normal and present, no rebound or gaurding  Extremities: cool extremities; no edema/cyanosis  Vascular: 2+ radial pulses bilaterally  Neurological: A&Ox0, bedbound, contractures in all 4 extremities, stiff neck; does not follow commands; does not answer yes/no questions, no vocalization    Transition to [facility]

## 2022-07-21 NOTE — PROGRESS NOTE ADULT - PROBLEM SELECTOR PLAN 1
Pt s/p CVA, baseline non verbal w/ cognitive impairment and bedbound. Per HHA has been more altered/lethargic from baseline for few days with decreased PO intake likely 2/2 UTI (UA with pyuria, bacteriuria). UCx growing Pseudomonas.   - UTI tx per below

## 2022-07-21 NOTE — DISCHARGE NOTE PROVIDER - NSDCCPCAREPLAN_GEN_ALL_CORE_FT
PRINCIPAL DISCHARGE DIAGNOSIS  Diagnosis: Dysphagia  Assessment and Plan of Treatment: Dysphagia is difficulty swallowing — taking more time and effort to move food or liquid from your mouth to your stomach. Dysphagia can be painful. In some cases, swallowing is impossible. Occasional difficulty swallowing, such as when you eat too fast or don't chew your food well enough, usually isn't cause for concern. But persistent dysphagia can be a serious medical condition requiring treatment. Dysphagia can occur at any age, but it's more common in older adults. The causes of swallowing problems vary. Your swallowing function did not recover while admitted to the hospital. After speaking with sister, Ms Haney, it was decided to transition to hospice with comfort feeds.      SECONDARY DISCHARGE DIAGNOSES  Diagnosis: Bacterial UTI  Assessment and Plan of Treatment: A urinary tract infection (UTI) is an infection in any part of your urinary system — your kidneys, ureters, bladder and urethra. Most infections involve the lower urinary tract — the bladder and the urethra. Women are at greater risk of developing a UTI than are men. Infection limited to your bladder can be painful and annoying. However, serious consequences can occur if a UTI spreads to your kidneys. Your UTI grew a bug called Pseudomonas and you were treated fully for your Pseudomonas UTI with antibiotics. Your UTI resolved during admission. No follow up for this condition is required.

## 2022-07-21 NOTE — DISCHARGE NOTE PROVIDER - HOSPITAL COURSE
89 yo with ho of HTN, HLD, multiple CVAs (bed bound w/ 24/7 HHA, non verbal, cognitive impairment) brought in after being found altered and decreased PO intake, found to have UTI and new O2 requirements.      #Acute metabolic encephalopathy.   Pt s/p CVA, baseline non verbal w/ cognitive impairment and bedbound. Per HHA has been more altered/lethargic from baseline for few days with decreased PO intake likely 2/2 UTI (UA with pyuria, bacteriuria). UCx growing Pseudomonas.   - UTI tx per below.    #UTI (urinary tract infection).   UA with pyuria, bacteruria, LE, nitrites. Unable to assess if patient symptomatic however more altered/lethargic from baseline on admission. No leukocytosis, negative lactate, not meeting SIRS criteria.   - s/p zosyn, s/p CTX 1g  - zosyn 3.375g q6 IV for Pseudomonal coverage (start date 7/18)  - d/c callejas    - UCx: Pseudomonas  - BCx: gram + cocci in clusters, PCR coag neg  - BCx repeat: no growth 12hr  - Ustrep neg, U legionella pending.    #Dysphagia.   Pt with decreased PO intake per HHA. On initial speech and swallow evaluation, pt not able to swallow. Pt currently NPO. Pt w/ continued increased oral secretions today.  - f/u speech recs  - NPO, aspiration precautions  - no tube feeds for now, per sister (health care proxy)  - continue D5 LR 80mL/hr  - POCT q6  - nutrition consult.    #CVA (cerebrovascular accident).   Hx CVA. Home med ASA, plavix. Non verbal bedbound at baseline. Ongoing goals of care conversations with sister health care proxy.  - home meds: asa 81mg & plavix (hold, NPO)  - palliative consult  - neuro consult.    #Acute respiratory failure with hypoxia - RESOLVED  No hx of COPD or lung disease, not on home O2. Initially placed on 15 L NRB then transitioned to 5L NC sat 99%. No accessory muscle use or incr WOB. CXR with elevated L hemidiaphragm, possibly positional, no acute infiltrates. CXR with no acute infiltrates, though consider aspiration PNA given dysphagia.  Pt O2 sat well on room air.  - d/c duoneb q6hr  - monitor O2 saturations  - RVP neg.    #Hypertension.   Hx HTN. Blood pressures elevated during admission.  - home meds: amlodipine 10mg, ramipril 20mg qd (hold, NPO)  - consider hydral 5mg IV if BPs >180/100s    #HLD  - home meds: lipitor 40 (hold, NPO).    #Troponin level elevated.   Presenting with troponin 0.02, unable to assess if patient is having chest pain. EKG x2 normal, no ST elevations. Repeat troponin 0.02.  - other cardiac enzymes WNL: , CKMB 1.9   - if up trending, repeat EKG    #Anemia   Known hx of anemia (baseline 2021 9.8). Iron studies in 03/2021 showed Fe borderline low at 29; TIBC low at 184. No signs of active bleeding, no melena. Per allscripts, taking ferrous sulfate 01/2022 but per formal med rec with HHA no longer taking.  - monitor H/H  - active T&S  - transfuse Hb <7.0.    #Major depression.   Hx MDD.  - home meds: bupropion 150mg XL (hold, NPO).    #Diabetes.   Hx DM, no currently on any medication. A1C 4.8 (7/17/2022).  - NPO   - SSI when tolerating PO.    PHYSICAL EXAM         88 yoF with PMH of HTN, HLD, CVA (Jan 2021, bed bound w/ 24/7 HHA, non verbal, cognitive impairment) presented with AMS and decreased PO intake, found to have increased O2 requirements and a Pseudomonas UTI s/p treatment. Course complicated by new onset dysphagia with ongoing goals of care discussions.    #Dysphagia  Pt with decreased PO intake per HHA for few days leading up to admission. Pt found to have absent swallowing abilities on admission per speech and swallow evaluation, and pt was placed on NPO with high aspiration risk. Pt was started on D5 maintenance fluids with electrolyte repletion. Pt also required frequent suctioning of oral secretions. Palliative care was consulted for ongoing goals of care discussions with pt's HCP (sister, Talita Haney). Swallowing did not recover despite full treatment of pt's UTI. Neurology was consulted for expert opinion on pt's dysphagia. Ethics was also consulted. The decision was made, in conversation with HCP, to place a temporary NGT for tube feedings. NGT removed and repeat evaluation by speech and swallow continued to show absent swallow capabilities. Decision was made with HCP to transition pt to hospice care with pleasure feeds during family meeting.   - pt d/c to hospice with pleasure feeds  - continue maintenance fluids D5 in LR at 40cc/hr, POCT q6hrs, electrolyte repletion while inpatient  - continue NPO, aspiration precautions while inpatient    #Acute metabolic encephalopathy  Pt s/p CVA, baseline non verbal w/ cognitive impairment and bedbound. Per HHA had been more altered/lethargic from baseline for few days with decreased PO intake likely 2/2 Pseudomonas UTI. Pt mental status improved through admission, per HHA appears to be closer to home baseline (A&Ox0, bedbound) except for dysphagia. Pt with intermittent responsiveness and alertness throughout admission.    #Pseudomonas UTI - RESOLVED  UA with pyuria, bacteruria, LE, nitrites. Unable to assess if patient symptomatic (pt nonverbal) however more altered/lethargic from baseline per HHA. No leukocytosis, negative lactate, not meeting SIRS criteria. Initial BCx showed gram + cocci in clusters coag neg, repeat BCx with no growth. UCx grew Pseudomonas and pt was fully treated with antibiotics. Pt with good urine output throughout admission.  - s/p zosyn 3.375g q6 IV for Pseudomonal coverage x 5 days (7/18-7/22)    #Acute respiratory failure with hypoxia - RESOLVED  No hx of COPD or lung disease, not on home O2. Initially placed on 15 L NRB then transitioned to 5L NC sat 99% in ED. CXR with no acute infiltrates, and no c/f pneumonia or other acute lung process. RVP negative. Pt with decreased O2 requirements throughout stay. Pt saturating well on room air on discharge.    #Acute diarrhea.   Pt with two episodes of new onset diarrhea on 7/25, no hematochezia/melena. Diarrhea was likely 2/2 tube feeds in addition to pt receiving miralax. Pt afebrile without leukocytosis, low c/f C diff, though can consider if diarrhea persists, ramin in context of recent abx for UTI tx.  - monitor BMs  - monitor electrolytes (pt NPO)    #CVA (cerebrovascular accident)  History of CVA in early 2021. Pt non verbal and bedbound at baseline. Home medications ASA and plavix. Palliative, ethics and neuro were consulted for goals of care discussions with HCP.  - home meds: ASA & plavix (restart if pt recovers swallowing capabilities)    #Hypertension  Hx HTN. Blood pressures elevated during admission, though within range.  - home meds: amlodipine 10mg, ramipril 20mg qd (restart if pt recovers swallowing capabilities)    #HLD  Hx HLD.  - home meds: lipitor 40 (restart if pt recovers swallowing capabilities)    #Troponin level elevated  Pt presented to ED with troponin 0.02, unable to assess if patient is having chest pain. EKG x2 normal, no ST elevations. Repeat troponin 0.02 (stable), CK and CKMB within normal limits. No c/f ACS event.    #Anemia  Known hx of anemia (baseline 2021 9.8). Iron studies in 03/2021 showed Fe borderline low at 29; TIBC low at 184. No signs of active bleeding, no melena. Pt formerly taking Fe supplementation earlier this year, but pt no longer taking on formal med rec. Hb stable throughout admission.    #Major depression.   Hx MDD.  - home meds: bupropion 150mg XL (restart if pt recovers swallowing capabilities)    #Diabetes Type II  Hx DM, not currently on any medication. A1C 4.8 (7/17/2022). Pt blood sugars were closely monitored every 6 hrs given NPO status. Blood sugars within range during admission.    Patient was discharged to: (home/[place]/acute rehab/hospice, etc, and with what services – home health PT/RN? Home O2?)    New medications:  Changes to old medications:  Medications that were stopped:  Items to follow up as outpatient:    Physical exam at the time of discharge:  ***         88 yoF with PMH of HTN, HLD, CVA (Jan 2021, bed bound w/ 24/7 HHA, non verbal, cognitive impairment) presented with AMS and decreased PO intake, found to have increased O2 requirements and a Pseudomonas UTI s/p treatment. Course complicated by new onset dysphagia with ongoing goals of care discussions.     Problem/Plan - 1:  ·  Problem: Pressure ulcer.  ·  Plan: Patient with new pressure ulcer stage 2 on left buttocks noted overnight (7/27).   - Wound care was consulted, triad wound dressing was applied to ulcer.      Problem/Plan - 2:  ·  Problem: Dysphagia.  ·  Plan: Pt with decreased PO intake per HHA for few days leading up to admission. Speech and swallow evaluation x3 over duration of pt stay, pt was unable to initiate swallow.   - Palliative care was consulted, patient will be going to [facility].   - Patient was receiving moistened swabs for oral hydration by HHA.  - Patient was receriving maintenance fluids D5 in LR at 40cc/hr  - Patient will be discharge on a pureed diet with thick liquids     Problem/Plan - 3:  ·  Problem: Acute metabolic encephalopathy.  ·  Plan: Pt s/p CVA, baseline non verbal w/ cognitive impairment and bedbound. Per HHA had been more altered/lethargic from baseline for few days with decreased PO intake likely 2/2 UTI (UA with pyuria, bacteriuria). UCx growing Pseudomonas s/p zosyn treatment 3.375g x5days.  -Pt mental status improved through admission, per HHA appeared to be closer to home baseline (A&Ox0, bedbound) except for dysphagia.     Problem/Plan - 4:  ·  Problem: Acute diarrhea.  ·  Plan: Pt with two episodes of new onset diarrhea yesterday night 7/25, no hematochezia/melena. Likely 2/2 tube feeds in addition to pt receiving miralax (new medication). Pt afebrile without leukocytosis, low c/f C diff, though can consider if diarrhea persists, ramin in context of recent abx for UTI tx.  - BMs and electrolytes were monitored.     Problem/Plan - 5:  ·  Problem: UTI (urinary tract infection)- RESOLVED  ·  Plan: UA with pyuria, bacteruria, LE, nitrites. Unable to assess if patient symptomatic however more altered/lethargic from baseline on admission. No leukocytosis, negative lactate, not meeting SIRS criteria.   - s/p zosyn 3.375g q6 IV for Pseudomonal coverage x 5 days (7/18-7/22)  - UCx showed Pseudomonas  - BCx showed gram + cocci in clusters, PCR coag negative  - BCx repeat showed no growth  - Ustrep was neg     Problem/Plan - 6:  ·  Problem: CVA (cerebrovascular accident).  ·  Plan: Hx CVA and dementia. Home med ASA, plavix. Non verbal bedbound at baseline. Ongoing goals of care conversations with sister health care proxy.  - home meds: asa 81mg & plavix (restart w/ NGT)  - Neuro was consulted and said swallow function may recover.     Problem/Plan - 7:  ·  Problem: Acute respiratory failure with hypoxia.- RESOLVED  ·  Plan: No hx of COPD or lung disease, not on home O2. Initially placed on 15 L NRB then transitioned to 5L NC sat 99%. No accessory muscle use or increased WOB. CXR with elevated L hemidiaphragm, possibly positional, no acute infiltrates. CXR with no acute infiltrates, though consider aspiration PNA given dysphagia.  - Pt O2 sat was well on room air. O2 sat was monitored throughout stay.   - RVP was negative     Problem/Plan - 8:  ·  Problem: Hypertension.  ·  Plan: Hx HTN. Blood pressures elevated during admission.  - home meds: amlodipine 10mg, ramipril 20mg qd  - Patient received Norvasc 10mg and Lisinopril 40mg during her stay     Problem/Plan - 9:  ·  Problem: Troponin level elevated.  ·  Plan: Presented with troponin 0.02, unable to assess if patient was having chest pain. EKG x2 normal, no ST elevations. Repeat troponin 0.02.  - other cardiac enzymes WNL: , CKMB 1.9     Problem/Plan - 10:  ·  Problem: Anemia.  ·  Plan; Known hx of anemia (baseline 2021 9.8). Iron studies in 03/2021 showed Fe borderline low at 29; TIBC low at 184. No signs of active bleeding, no melena. Per allscripts, taking ferrous sulfate 01/2022 but per formal med rec with HHA no longer taking.  - During patient's stay, H/H was monitored and an active T&S was maintained.     Problem/Plan - 11:  ·  Problem: Major depression.  ·  Plan: Hx MDD.  - home meds: bupropion 150mg XL.     Problem/Plan - 12:  ·  Problem: Diabetes.  ·  Plan: Hx DM, not currently on any medication. A1C 4.8 (7/17/2022).  - Patient was on sliding scale insulin with tube feeds    Patient was discharged to: [facility]    Physical exam at the time of discharge:  General: NAD, resting in bed, eyes and mouth open, thin, cachectic   Head: NC/AT, anicteric sclera, dry MM, neck stick, mouth open  Neck: Supple; trachea midline  Respiratory: CTAB anteriorly w/ transmitted upper airways sounds; no wheezes, increased work of breathing, retractions  Cardiovascular: RRR; holosystolic murmur, S1 w/ muted S2, no LE edema  Gastrointestinal: Soft abdomen, bowel sounds normal and present, no rebound or gaurding  Extremities: cool extremities; no edema/cyanosis  Vascular: 2+ radial pulses bilaterally  Neurological: A&Ox0, bedbound, contractures in all 4 extremities, stiff neck; does not follow commands; does not answer yes/no questions, no vocalization

## 2022-07-21 NOTE — PROGRESS NOTE ADULT - PROBLEM SELECTOR PLAN 4
Hx CVA. Home med ASA, plavix. Non verbal bedbound at baseline. Ongoing goals of care conversations with sister health care proxy.  - home meds: asa 81mg & plavix (hold, NPO)  - palliative consult  - neuro consult Hx CVA and dementia. Home med ASA, plavix. Non verbal bedbound at baseline. Ongoing goals of care conversations with sister health care proxy.  - home meds: asa 81mg & plavix (hold, NPO)  - palliative consult  - ethics consult  - neuro consult: dysphagia not likely reversible d/t advanced dementia, can consider NG tube

## 2022-07-22 LAB
ANION GAP SERPL CALC-SCNC: 10 MMOL/L — SIGNIFICANT CHANGE UP (ref 5–17)
BASOPHILS # BLD AUTO: 0.09 K/UL — SIGNIFICANT CHANGE UP (ref 0–0.2)
BASOPHILS NFR BLD AUTO: 0.9 % — SIGNIFICANT CHANGE UP (ref 0–2)
BUN SERPL-MCNC: 6 MG/DL — LOW (ref 7–23)
CALCIUM SERPL-MCNC: 9.1 MG/DL — SIGNIFICANT CHANGE UP (ref 8.4–10.5)
CHLORIDE SERPL-SCNC: 99 MMOL/L — SIGNIFICANT CHANGE UP (ref 96–108)
CO2 SERPL-SCNC: 27 MMOL/L — SIGNIFICANT CHANGE UP (ref 22–31)
CREAT SERPL-MCNC: 0.63 MG/DL — SIGNIFICANT CHANGE UP (ref 0.5–1.3)
EGFR: 85 ML/MIN/1.73M2 — SIGNIFICANT CHANGE UP
EOSINOPHIL # BLD AUTO: 0.09 K/UL — SIGNIFICANT CHANGE UP (ref 0–0.5)
EOSINOPHIL NFR BLD AUTO: 0.9 % — SIGNIFICANT CHANGE UP (ref 0–6)
GLUCOSE BLDC GLUCOMTR-MCNC: 116 MG/DL — HIGH (ref 70–99)
GLUCOSE SERPL-MCNC: 126 MG/DL — HIGH (ref 70–99)
HCT VFR BLD CALC: 30.2 % — LOW (ref 34.5–45)
HGB BLD-MCNC: 9.7 G/DL — LOW (ref 11.5–15.5)
IMM GRANULOCYTES NFR BLD AUTO: 0.2 % — SIGNIFICANT CHANGE UP (ref 0–1.5)
LYMPHOCYTES # BLD AUTO: 1.86 K/UL — SIGNIFICANT CHANGE UP (ref 1–3.3)
LYMPHOCYTES # BLD AUTO: 19.4 % — SIGNIFICANT CHANGE UP (ref 13–44)
MAGNESIUM SERPL-MCNC: 1.9 MG/DL — SIGNIFICANT CHANGE UP (ref 1.6–2.6)
MCHC RBC-ENTMCNC: 26.7 PG — LOW (ref 27–34)
MCHC RBC-ENTMCNC: 32.1 GM/DL — SIGNIFICANT CHANGE UP (ref 32–36)
MCV RBC AUTO: 83.2 FL — SIGNIFICANT CHANGE UP (ref 80–100)
MONOCYTES # BLD AUTO: 0.67 K/UL — SIGNIFICANT CHANGE UP (ref 0–0.9)
MONOCYTES NFR BLD AUTO: 7 % — SIGNIFICANT CHANGE UP (ref 2–14)
NEUTROPHILS # BLD AUTO: 6.87 K/UL — SIGNIFICANT CHANGE UP (ref 1.8–7.4)
NEUTROPHILS NFR BLD AUTO: 71.6 % — SIGNIFICANT CHANGE UP (ref 43–77)
NRBC # BLD: 0 /100 WBCS — SIGNIFICANT CHANGE UP (ref 0–0)
PHOSPHATE SERPL-MCNC: 3.2 MG/DL — SIGNIFICANT CHANGE UP (ref 2.5–4.5)
PLATELET # BLD AUTO: 301 K/UL — SIGNIFICANT CHANGE UP (ref 150–400)
POTASSIUM SERPL-MCNC: 3 MMOL/L — LOW (ref 3.5–5.3)
POTASSIUM SERPL-SCNC: 3 MMOL/L — LOW (ref 3.5–5.3)
RBC # BLD: 3.63 M/UL — LOW (ref 3.8–5.2)
RBC # FLD: 14.8 % — HIGH (ref 10.3–14.5)
SODIUM SERPL-SCNC: 136 MMOL/L — SIGNIFICANT CHANGE UP (ref 135–145)
WBC # BLD: 9.6 K/UL — SIGNIFICANT CHANGE UP (ref 3.8–10.5)
WBC # FLD AUTO: 9.6 K/UL — SIGNIFICANT CHANGE UP (ref 3.8–10.5)

## 2022-07-22 PROCEDURE — 99233 SBSQ HOSP IP/OBS HIGH 50: CPT

## 2022-07-22 PROCEDURE — 71045 X-RAY EXAM CHEST 1 VIEW: CPT | Mod: 26

## 2022-07-22 PROCEDURE — 99232 SBSQ HOSP IP/OBS MODERATE 35: CPT | Mod: GC

## 2022-07-22 PROCEDURE — 99497 ADVNCD CARE PLAN 30 MIN: CPT

## 2022-07-22 PROCEDURE — 99232 SBSQ HOSP IP/OBS MODERATE 35: CPT

## 2022-07-22 RX ORDER — BENZOCAINE 10 %
2 GEL (GRAM) MUCOUS MEMBRANE ONCE
Refills: 0 | Status: COMPLETED | OUTPATIENT
Start: 2022-07-22 | End: 2022-07-22

## 2022-07-22 RX ORDER — POTASSIUM CHLORIDE 20 MEQ
40 PACKET (EA) ORAL ONCE
Refills: 0 | Status: COMPLETED | OUTPATIENT
Start: 2022-07-22 | End: 2022-07-22

## 2022-07-22 RX ADMIN — PIPERACILLIN AND TAZOBACTAM 200 GRAM(S): 4; .5 INJECTION, POWDER, LYOPHILIZED, FOR SOLUTION INTRAVENOUS at 19:56

## 2022-07-22 RX ADMIN — Medication 40 MILLIEQUIVALENT(S): at 22:33

## 2022-07-22 RX ADMIN — PIPERACILLIN AND TAZOBACTAM 200 GRAM(S): 4; .5 INJECTION, POWDER, LYOPHILIZED, FOR SOLUTION INTRAVENOUS at 00:28

## 2022-07-22 RX ADMIN — ENOXAPARIN SODIUM 30 MILLIGRAM(S): 100 INJECTION SUBCUTANEOUS at 19:57

## 2022-07-22 RX ADMIN — PIPERACILLIN AND TAZOBACTAM 200 GRAM(S): 4; .5 INJECTION, POWDER, LYOPHILIZED, FOR SOLUTION INTRAVENOUS at 13:52

## 2022-07-22 RX ADMIN — PIPERACILLIN AND TAZOBACTAM 200 GRAM(S): 4; .5 INJECTION, POWDER, LYOPHILIZED, FOR SOLUTION INTRAVENOUS at 06:22

## 2022-07-22 RX ADMIN — SODIUM CHLORIDE 40 MILLILITER(S): 9 INJECTION, SOLUTION INTRAVENOUS at 00:32

## 2022-07-22 NOTE — PROGRESS NOTE ADULT - PROBLEM SELECTOR PLAN 4
Hx CVA and dementia. Home med ASA, plavix. Non verbal bedbound at baseline. Ongoing goals of care conversations with sister health care proxy.  - home meds: asa 81mg & plavix (hold, NPO)  - palliative consult  - ethics consult  - neuro consult: dysphagia not likely reversible d/t advanced dementia, can consider NG tube Hx CVA and dementia. Home med ASA, plavix. Non verbal bedbound at baseline. Ongoing goals of care conversations with sister health care proxy.  - home meds: asa 81mg & plavix (hold, NPO)  - palliative consult  - ethics consult  - neuro consult: swallow function may recover. Hx CVA and dementia. Home med ASA, plavix. Non verbal bedbound at baseline. Ongoing goals of care conversations with sister health care proxy.  - home meds: asa 81mg & plavix (restart w/ NGT)  - palliative consult  - ethics consult  - neuro consult: swallow function may recover

## 2022-07-22 NOTE — PROGRESS NOTE ADULT - PROBLEM SELECTOR PLAN 5
.  functional and cognitive baselines prior to admission: non verbal, total care, bed bound, full feed, aspirating prior to admission.   - hospice eligible

## 2022-07-22 NOTE — PROGRESS NOTE ADULT - PROBLEM SELECTOR PLAN 1
.  PPS 30%. Total assist ADLs  - Cw supportive care, skin care, repositioning  - dispo home resume 24/7 HHA

## 2022-07-22 NOTE — PROGRESS NOTE ADULT - PROBLEM SELECTOR PLAN 10
Hx DM, no currently on any medication. A1C 4.8 (7/17/2022).  - NPO   - SSI when tolerating PO Hx DM, no currently on any medication. A1C 4.8 (7/17/2022).  - SSI w/ tube feeds

## 2022-07-22 NOTE — PROGRESS NOTE ADULT - SUBJECTIVE AND OBJECTIVE BOX
Neurology Progress Note    Interval History:    Patient was seen and examined, no acute event over night. Start to respond verbally.     Medications:  benzocaine 20% Spray 2 Spray(s) Mucosal once  dextrose 5% + lactated ringers. 1000 milliLiter(s) IV Continuous <Continuous>  enoxaparin Injectable 30 milliGRAM(s) SubCutaneous every 24 hours  piperacillin/tazobactam IVPB.. 3.375 Gram(s) IV Intermittent every 6 hours  potassium chloride   Powder 40 milliEquivalent(s) Enteral Tube once      Vital Signs Last 24 Hrs  T(C): 36.4 (22 Jul 2022 14:00), Max: 36.9 (22 Jul 2022 05:46)  T(F): 97.5 (22 Jul 2022 14:00), Max: 98.4 (22 Jul 2022 05:46)  HR: 74 (22 Jul 2022 14:00) (74 - 83)  BP: 160/72 (22 Jul 2022 14:00) (124/76 - 160/72)  BP(mean): --  RR: 18 (22 Jul 2022 14:00) (18 - 18)  SpO2: 100% (22 Jul 2022 14:00) (93% - 100%)    Parameters below as of 22 Jul 2022 14:00  Patient On (Oxygen Delivery Method): nasal cannula  O2 Flow (L/min): 2      GEN: NAD, frail lady, mouth is wide open with noisy breathing    NEURO:   MENTAL STATUS: AAOx0, lethargic but easily accusable to verbal stimuli, able to follow simple commands  LANG/SPEECH: dysarthric and low sound speech with barely comprehendible words  CRANIAL NERVES:  II: Pupils equal and reactive, no RAPD  III, IV, VI: EOM intact, , gaze preference to right  V: normal  VII: no facial asymmetry  VIII: can hear and follow some commands  MOTOR: 4/5 in both upper with hypertonus L>R, 1/5 lower extremities  REFLEXES: 1/4 throughout, bilateral flexor plantars  SENSORY: Unable to assess.   COORD: unable to assess    Labs:  CBC Full  -  ( 22 Jul 2022 10:00 )  WBC Count : 9.60 K/uL  RBC Count : 3.63 M/uL  Hemoglobin : 9.7 g/dL  Hematocrit : 30.2 %  Platelet Count - Automated : 301 K/uL  Mean Cell Volume : 83.2 fl  Mean Cell Hemoglobin : 26.7 pg  Mean Cell Hemoglobin Concentration : 32.1 gm/dL  Auto Neutrophil # : 6.87 K/uL  Auto Lymphocyte # : 1.86 K/uL  Auto Monocyte # : 0.67 K/uL  Auto Eosinophil # : 0.09 K/uL  Auto Basophil # : 0.09 K/uL  Auto Neutrophil % : 71.6 %  Auto Lymphocyte % : 19.4 %  Auto Monocyte % : 7.0 %  Auto Eosinophil % : 0.9 %  Auto Basophil % : 0.9 %    07-22    136  |  99  |  6<L>  ----------------------------<  126<H>  3.0<L>   |  27  |  0.63    Ca    9.1      22 Jul 2022 10:00  Phos  3.2     07-22  Mg     1.9     07-22              RADIOLOGY & ADDITIONAL TESTS:

## 2022-07-22 NOTE — PROGRESS NOTE ADULT - PROBLEM SELECTOR PLAN 5
*RESOLVED  No hx of COPD or lung disease, not on home O2. Initially placed on 15 L NRB then transitioned to 5L NC sat 99%. No accessory muscle use or incr WOB. CXR with elevated L hemidiaphragm, possibly positional, no acute infiltrates. CXR with no acute infiltrates, though consider aspiration PNA given dysphagia.  Pt O2 sat well on room air.  - monitor O2 saturations  - RVP neg

## 2022-07-22 NOTE — PROGRESS NOTE ADULT - ASSESSMENT
89 yo F with ho of multiple CVAs, dementia, HTN, HLD, DM brought in by HHA after being found altered and decreased PO intake, found to have UTI and new O2 requirements.   He baseline (per HHA):  bed bound w/ 24/7 HHA, non verbal with only "yes" and "no", was able to follow very simple verbal commands, eating and swallowing well. On today's exam she is bed-bound, frail lady with temporal wasting, lethargic but arousable on verbal stimuli, verbally responding and (yesterday was non-verbal) follows simple verbal commands periodically, moderate to severe muscle contractions x 4 with limited ROM in UE and LE, gaze preference to R. Per official SLP evaluation, she's not able to initiate swallowing. Considering acute onset of dysphagia on the settings of active UTI and a Hx of multiple CVA, bedbound state with muscle contractions, severe dementia she is currently encephalopathic due to active UTI and with successful treatment her active infection, there's a chance of returning her mentation to baseline and start swallowing again.     - c/w current Tx  - daily speech and swallow evaluation      The note is not active w/o attending confirmation.

## 2022-07-22 NOTE — PROGRESS NOTE ADULT - PROBLEM SELECTOR PLAN 3
Pt with decreased PO intake per HHA. Speech and swallow evaluation x2, pt not able to initiate swallow. Pt with continued oral secretions, getting suctioned.  - f/u speech recs   - NPO, aspiration precautions  - no tube feeds for now, per sister (health care proxy)  - D5 LR 40mL/hr; monitor volume status  - POCT q6  - nutrition consult Pt with decreased PO intake per HHA for few days leading up to admission. Speech and swallow evaluation x2, pt not able to initiate swallow. Pt with continued oral secretions, getting suctioned.  - f/u speech recs   - f/u nutrition recs  - dobhoff tube placement 7/22  - start tube feeds, per nutrition recs  - NPO, aspiration precautions    - D5 LR 40mL/hr; monitor volume status  - POCT q6 Pt with decreased PO intake per HHA for few days leading up to admission. Speech and swallow evaluation x2, pt not able to initiate swallow. Pt with continued oral secretions, getting suctioned.  - f/u speech recs   - f/u nutrition recs  - dobhoff tube placement 7/22  - start tube feeds, per nutrition recs  - d/c D5 maintenance fluids  - NPO, aspiration precautions  - POCT q6

## 2022-07-22 NOTE — PROGRESS NOTE ADULT - PROBLEM SELECTOR PLAN 3
.  hx CVA non verbal, total care at baseline. hospital course cb toxic metabolic encephalopathy 2/2 UTI. Pt remains unable to participate in interview, but is more alert compared to admission, intermittently tracking and answers y/n questions  - Risk for delirium, reorient patient and reinforce pts safety   - Avoid benzos, sedating and anticholinergic medications

## 2022-07-22 NOTE — PROGRESS NOTE ADULT - PROBLEM SELECTOR PLAN 2
UA with pyuria, bacteruria, LE, nitrites. Unable to assess if patient symptomatic however more altered/lethargic from baseline on admission. No leukocytosis, negative lactate, not meeting SIRS criteria.   - s/p zosyn, s/p CTX 1g  - zosyn 3.375g q6 IV for Pseudomonal coverage (7/18-7/22)  - pt with good urine output, not retaining per overnight bladder scan    - UCx: Pseudomonas  - BCx: gram + cocci in clusters, PCR coag neg  - BCx repeat: no growth x1day  - Ustrep neg, U legionella pending UA with pyuria, bacteruria, LE, nitrites. Unable to assess if patient symptomatic however more altered/lethargic from baseline on admission. No leukocytosis, negative lactate, not meeting SIRS criteria.   - s/p zosyn 3.375g q6 IV for Pseudomonal coverage x 5 days (7/18-7/22)  - pt with good urine output, not retaining per bladder scans x2    - UCx: Pseudomonas  - BCx: gram + cocci in clusters, PCR coag neg  - BCx repeat: no growth  - Ustrep neg, U legionella pending

## 2022-07-22 NOTE — PROGRESS NOTE ADULT - SUBJECTIVE AND OBJECTIVE BOX
SUBJECTIVE: pt seen and examined. mentation w minimal change compared to yesterday. awake, tracking. intermittently replies appropriately to y/n questions, mostly does not follow commands. denies pain.     ongoing discussions w pts HCP/sister, Talita, and pts friend, Madison. All in agreement with current POC, GOC. Introduced philosophy and role of hospice for the future; they are clear that symptom directed care and discharge from [facility] with home hospice is not within GOC but understand the importance of revisiting GOC/hospice in weeks/months ahead.     DNR in chart: Yes      ROS limited due to patient mentation.     PEx:  T(C): 36.9 (07-22-22 @ 05:46), Max: 37.1 (07-21-22 @ 14:43)  HR: 83 (07-22-22 @ 05:46) (81 - 87)  BP: 124/76 (07-22-22 @ 05:46) (124/76 - 152/72)  RR: 18 (07-22-22 @ 05:46) (17 - 18)  SpO2: 98% (07-22-22 @ 05:46) (91% - 98%)  Wt(kg): --    General: frail elderly woman more alert, oriented x 0, appears overtly comfortable  HEENT: atraumatic, No abnormal lesions, +dry mouth,  +temporal wasting  RESP: Reg rhythm, No  tachypnea/labored breathing,  +improved secretions, diminished bilat bases  CV: RRR, S1S2, No    tachycardia  GI: soft non distended non tender incontinent   : incontinent   MUSK: weakness x4,  fully  BB  SKIN: No abnormal skin lesions, Poor skin turgor  NEURO:  +cognitive impairment, essentially NV,  +contracted upper and lower extremities   Oral intake ability: unable/only mouth care  Palliative Performance Scale/Karnofsky Score: 30  ECOG Performance: 4    ALLERGIES: morphine (Other)      MEDICATIONS: REVIEWED  MEDICATIONS  (STANDING):  dextrose 5% + lactated ringers. 1000 milliLiter(s) (40 mL/Hr) IV Continuous <Continuous>  enoxaparin Injectable 30 milliGRAM(s) SubCutaneous every 24 hours  piperacillin/tazobactam IVPB.. 3.375 Gram(s) IV Intermittent every 6 hours    MEDICATIONS  (PRN):    LABS: REVIEWED  CBC:                        9.7    9.60  )-----------( 301      ( 22 Jul 2022 10:00 )             30.2     CMP:    07-22    136  |  99  |  6<L>  ----------------------------<  126<H>  3.0<L>   |  27  |  0.63    Ca    9.1      22 Jul 2022 10:00  Phos  3.2     07-22  Mg     1.9     07-22        RADIOLOGY & ADDITIONAL STUDIES:     PROTEIN CALORIE MALNUTRITION PRESENT:  [ ]mild [ ]moderate []severe [ ]underweight [ ]morbid obesity  [x ] PPSV2 < or = 30%   [ ] significant weight loss   [x ] poor nutritional intake   [ ] anasarca   [ ] catabolic state      [ ] Artificial Nutrition     PSYCHOSOCIAL ASSESSMENT:  - Significant other/partner: [  ]  Children: [  ]  - Living Situation: Home [ x ] Long term care [  ]  Rehab[  ]  Other[  ]  - Uatsdin/Spiritual practice: [employer]   - Coping: well[ ]   with difficulty[ ]   poor coping[ ] unable to assess dt pt mentation [x ]  - lives at home w 24/7 HHA    ADVANCE DIRECTIVES:    - MOLST[ x ] completed in physical chart Living Will [  ]   DECISION MAKER(s):  - Health Care Proxy(s) [x  ]  Surrogate(s)[  ] Guardian[  ]    PoA [x]  Name(s)/Phone Number(s):   - reported PoA is sister Talita Thomas [phone]   - friend Madison Chavez #[phone]    DISCUSSION OF CASE:  - Taltia Almaraz Dr. Ahmad

## 2022-07-22 NOTE — PROGRESS NOTE ADULT - PROBLEM SELECTOR PLAN 9
Hx MDD.  - home meds: bupropion 150mg XL (hold, NPO) Hx MDD.  - home meds: bupropion 150mg XL (restart w/ NGT)

## 2022-07-22 NOTE — PROCEDURE NOTE - NSINFORMCONSENT_GEN_A_CORE
by health care proxy (sister - Talita Haney)/Benefits, risks, and possible complications of procedure explained to patient/caregiver who verbalized understanding and gave verbal consent.

## 2022-07-22 NOTE — PROGRESS NOTE ADULT - PROBLEM SELECTOR PLAN 2
.  iso metabolic encephalopathy, UTI. per chart review and HHA, in weeks prior to admission pt w hx of asp with PO.  mentation has limited SLP evals and clearance for PO diet.   - NGT through the weekend and re-eval SLP on Monday  - would not recommend PEG as this will not improve QOL or prognosis for this patient   - recommend allowing food for comfort w texture recs per SS if pt is awake, willing to eat

## 2022-07-22 NOTE — PROGRESS NOTE ADULT - PROBLEM SELECTOR PLAN 11
F: D5 in LR 80cc/hr while NPO  E: replete to K>4, Mg>2, Phos>2.5  N: NPO pending mental status improvement/dysphagia screen  Ppx: lovenox 30mg qD    Code Status: DNR - MOLST in chart     Dispo: home vs home w/ hospice F: none  E: replete to K>4, Mg>2, Phos>2.5  N: per nutrition recs through dobhoff  Ppx: lovenox 30mg qD    Code Status: DNR - MOLST in chart     Dispo: home vs home w/ hospice

## 2022-07-22 NOTE — PROGRESS NOTE ADULT - PROBLEM SELECTOR PLAN 1
Pt s/p CVA, baseline non verbal w/ cognitive impairment and bedbound. Per HHA has been more altered/lethargic from baseline for few days with decreased PO intake likely 2/2 UTI (UA with pyuria, bacteriuria). UCx growing Pseudomonas.   - UTI tx per below Pt s/p CVA, baseline non verbal w/ cognitive impairment and bedbound. Per HHA has been more altered/lethargic from baseline for few days with decreased PO intake likely 2/2 UTI (UA with pyuria, bacteriuria). UCx growing Pseudomonas s/p zosyn treatment 3.375g x5days.    Pt mental status improving through admission, though not at baseline.

## 2022-07-22 NOTE — PROGRESS NOTE ADULT - PROBLEM SELECTOR PLAN 6
Hx HTN. Blood pressures elevated during admission.  - home meds: amlodipine 10mg, ramipril 20mg qd (hold, NPO)  - consider hydral 5mg IV if BPs >180/100s    #HLD  - home meds: lipitor 40 (hold, NPO) Hx HTN. Blood pressures elevated during admission.  - home meds: amlodipine 10mg, ramipril 20mg qd (hold, NPO)  - consider hydral 5mg IV if BPs >180/100s    #HLD  - home meds: lipitor 40 (restart w/ dobhoff)

## 2022-07-22 NOTE — PROGRESS NOTE ADULT - SUBJECTIVE AND OBJECTIVE BOX
OVERNIGHT EVENTS:     SUBJECTIVE / INTERVAL HPI: Patient seen and examined at bedside.     VITAL SIGNS:  Vital Signs Last 24 Hrs  T(C): 36.9 (22 Jul 2022 05:46), Max: 37.1 (21 Jul 2022 14:43)  T(F): 98.4 (22 Jul 2022 05:46), Max: 98.7 (21 Jul 2022 14:43)  HR: 83 (22 Jul 2022 05:46) (81 - 87)  BP: 124/76 (22 Jul 2022 05:46) (124/76 - 152/72)  BP(mean): --  RR: 18 (22 Jul 2022 05:46) (17 - 18)  SpO2: 98% (22 Jul 2022 05:46) (91% - 98%)    Parameters below as of 22 Jul 2022 05:46  Patient On (Oxygen Delivery Method): nasal cannula  O2 Flow (L/min): 4      PHYSICAL EXAM:    General: NAD  HEENT: NC/AT; PERRL, anicteric sclera; MMM  Neck: supple  Cardiovascular: +S1/S2; RRR  Respiratory: CTA B/L; no W/R/R  Gastrointestinal: soft, NT/ND; +BSx4  Extremities: WWP; no edema, clubbing or cyanosis  Vascular: 2+ radial, DP/PT pulses B/L  Neurological: AAOx3; no focal deficits    MEDICATIONS:  MEDICATIONS  (STANDING):  dextrose 5% + lactated ringers. 1000 milliLiter(s) (40 mL/Hr) IV Continuous <Continuous>  enoxaparin Injectable 30 milliGRAM(s) SubCutaneous every 24 hours  piperacillin/tazobactam IVPB.. 3.375 Gram(s) IV Intermittent every 6 hours    MEDICATIONS  (PRN):      ALLERGIES:  Allergies    morphine (Other)    Intolerances        LABS:                        9.5    6.48  )-----------( 275      ( 21 Jul 2022 05:30 )             30.1     07-21    136  |  100  |  5<L>  ----------------------------<  131<H>  3.6   |  27  |  0.56    Ca    9.1      21 Jul 2022 05:30  Phos  2.3     07-21  Mg     1.9     07-21          CAPILLARY BLOOD GLUCOSE      POCT Blood Glucose.: 163 mg/dL (21 Jul 2022 18:00)      RADIOLOGY & ADDITIONAL TESTS: Reviewed.   SUBJECTIVE / INTERVAL HPI: Patient seen and examined at bedside. No overnight events. Pt slept well per HHA. Decreased oral secretions (monitored via suctioning). Good urine output. Pt more arousable compared to admission, arousability the same compared to yesterday. Did not respond to yes/no questions or follow commands on my exam this morning.    VITAL SIGNS:  Vital Signs Last 24 Hrs  T(C): 36.9 (22 Jul 2022 05:46), Max: 37.1 (21 Jul 2022 14:43)  T(F): 98.4 (22 Jul 2022 05:46), Max: 98.7 (21 Jul 2022 14:43)  HR: 83 (22 Jul 2022 05:46) (81 - 87)  BP: 124/76 (22 Jul 2022 05:46) (124/76 - 152/72)  BP(mean): --  RR: 18 (22 Jul 2022 05:46) (17 - 18)  SpO2: 98% (22 Jul 2022 05:46) (91% - 98%)    Parameters below as of 22 Jul 2022 05:46  Patient On (Oxygen Delivery Method): nasal cannula  O2 Flow (L/min): 4      PHYSICAL EXAM:    General: thin, cachetic female, resting in bed; intermittently opening eyes  HEENT: NC/AT; dry mucous membranes  Neck: stiff, contractures in extremities x4  Cardiovascular: +S1/S2; RRR  Respiratory: CTA B/L; no increased work of breathing  Gastrointestinal: soft, NT/ND; +BSx4  Extremities: WWP; no edema  Vascular: 2+ radial, DP pulses bilaterally  Neurological: AAOx0; does not respond to y/n questions on my exam this morning, only intermittent moaning noises; does not follow verbal commands (asked her to lift her hand, squeeze fingers, open eyes)    MEDICATIONS:  MEDICATIONS  (STANDING):  dextrose 5% + lactated ringers. 1000 milliLiter(s) (40 mL/Hr) IV Continuous <Continuous>  enoxaparin Injectable 30 milliGRAM(s) SubCutaneous every 24 hours  piperacillin/tazobactam IVPB.. 3.375 Gram(s) IV Intermittent every 6 hours    MEDICATIONS  (PRN):      ALLERGIES:  Allergies    morphine (Other)    Intolerances        LABS:                        9.5    6.48  )-----------( 275      ( 21 Jul 2022 05:30 )             30.1     07-21    136  |  100  |  5<L>  ----------------------------<  131<H>  3.6   |  27  |  0.56    Ca    9.1      21 Jul 2022 05:30  Phos  2.3     07-21  Mg     1.9     07-21          CAPILLARY BLOOD GLUCOSE      POCT Blood Glucose.: 163 mg/dL (21 Jul 2022 18:00)      RADIOLOGY & ADDITIONAL TESTS: Reviewed.

## 2022-07-22 NOTE — PROCEDURE NOTE - ADDITIONAL PROCEDURE DETAILS
Dobhoff tube insertion without complications. Used benzocaine spray, insertion smooth, non traumatic. Secured with tape. CXR to confirm placement.

## 2022-07-22 NOTE — PROGRESS NOTE ADULT - PROBLEM SELECTOR PLAN 6
.  - PoA is pts 92 yo sister Talita Thomas #[phone]  - friend Madison Chavez #[phone]  - DNR DNI MOLST  in physical chart   - Ongoing support and medical updates provided to pts sisterTalita, and her friend, Madison. All in agreement with plan for temp NGT and SLP re-eval on Monday  - Introduced philosophy and role of hospice; they are clear that symptom directed care and discharge from [facility] with home hospice is not within GOC, understand the role and importance of revisiting these discussions in weeks/months ahead  - Tentative Dispo: home, resume 24/7 A    In addition to the E/M visit, an advance care planning meeting was performed. Start time: 1145; End time: 1215 Total time: 30 min. A TC meeting to discuss advance care planning was held today regarding: ADRIANNE OMALLEY. Primary decision maker: Patient is unable to participate in decision making; Alternate/surrogate: Talita franco  Discussed advance directives including, but not limited to, healthcare proxy and code status. Decision regarding code status: DNR/DNI, No Peg Documentation completed today:  [facility] note

## 2022-07-22 NOTE — PROGRESS NOTE ADULT - ASSESSMENT
88F with multiple CVA, HTN, HLD presented 7/17 from home with altered mental status and failure to thrive. +Uti, encephalopathy improving. Palliative care following for GO.     ·	HCP/PoA is 92 yo sister Talita Thomas #[phone]  ·	DNR DNI MOLST  in physical chart   ·	Ongoing support and medical updates provided to pts sister, Talita, and her friend, Madison. All in agreement with plan for temp NGT and SLP re-eval on Monday  ·	Introduced philosophy/role of hospice. They are clear that symptom directed care and discharge from [facility] with home hospice is not within GO, but understand the role and importance of revisiting these discussions in future.   ·	Dispo home, resume 24/7 A

## 2022-07-22 NOTE — PROGRESS NOTE ADULT - PROBLEM SELECTOR PLAN 7
.  Mentation continues to improve, but not substantially. NGT placed today.  If pt without adequate improvement of mentation and subsequent PO intake, pt likely in the last weeks of her life. Hospice eligible, but not within GOC. Ongoing support provided to pts sister.      Active Psychosocial Referrals:   SW/CM[  x] PT/OT[  ] Chaplaincy[   ]Hospice[  ]  Ethics[x  ]  Dian Marcus Patient/Family Support[  ] Holistic RN[  ] Massage Therapy[  ]    Coping:  well[  ] with difficulty[  ] poor coping[  ] unable to assess[ x ]   Support system: strong[  ] adequate[x  ] inadequate[  ]     -  primary team, Dr. Sequeira  - For new or uncontrolled symptoms, please call Palliative Care at [phone]. The service is available 24/7 (including nights & weekends) to provide symptom management recommendations over the phone as appropriate  - Will continue to follow with you

## 2022-07-23 LAB
ANION GAP SERPL CALC-SCNC: 11 MMOL/L — SIGNIFICANT CHANGE UP (ref 5–17)
BUN SERPL-MCNC: 7 MG/DL — SIGNIFICANT CHANGE UP (ref 7–23)
CALCIUM SERPL-MCNC: 9.4 MG/DL — SIGNIFICANT CHANGE UP (ref 8.4–10.5)
CHLORIDE SERPL-SCNC: 100 MMOL/L — SIGNIFICANT CHANGE UP (ref 96–108)
CO2 SERPL-SCNC: 24 MMOL/L — SIGNIFICANT CHANGE UP (ref 22–31)
CREAT SERPL-MCNC: 0.58 MG/DL — SIGNIFICANT CHANGE UP (ref 0.5–1.3)
EGFR: 87 ML/MIN/1.73M2 — SIGNIFICANT CHANGE UP
GLUCOSE BLDC GLUCOMTR-MCNC: 134 MG/DL — HIGH (ref 70–99)
GLUCOSE BLDC GLUCOMTR-MCNC: 143 MG/DL — HIGH (ref 70–99)
GLUCOSE BLDC GLUCOMTR-MCNC: 145 MG/DL — HIGH (ref 70–99)
GLUCOSE SERPL-MCNC: 115 MG/DL — HIGH (ref 70–99)
HCT VFR BLD CALC: 28.8 % — LOW (ref 34.5–45)
HGB BLD-MCNC: 9.4 G/DL — LOW (ref 11.5–15.5)
MAGNESIUM SERPL-MCNC: 1.8 MG/DL — SIGNIFICANT CHANGE UP (ref 1.6–2.6)
MCHC RBC-ENTMCNC: 26.4 PG — LOW (ref 27–34)
MCHC RBC-ENTMCNC: 32.6 GM/DL — SIGNIFICANT CHANGE UP (ref 32–36)
MCV RBC AUTO: 80.9 FL — SIGNIFICANT CHANGE UP (ref 80–100)
NRBC # BLD: 0 /100 WBCS — SIGNIFICANT CHANGE UP (ref 0–0)
PHOSPHATE SERPL-MCNC: 2.4 MG/DL — LOW (ref 2.5–4.5)
PLATELET # BLD AUTO: 313 K/UL — SIGNIFICANT CHANGE UP (ref 150–400)
POTASSIUM SERPL-MCNC: 3.6 MMOL/L — SIGNIFICANT CHANGE UP (ref 3.5–5.3)
POTASSIUM SERPL-SCNC: 3.6 MMOL/L — SIGNIFICANT CHANGE UP (ref 3.5–5.3)
RBC # BLD: 3.56 M/UL — LOW (ref 3.8–5.2)
RBC # FLD: 14.6 % — HIGH (ref 10.3–14.5)
SODIUM SERPL-SCNC: 135 MMOL/L — SIGNIFICANT CHANGE UP (ref 135–145)
WBC # BLD: 9.3 K/UL — SIGNIFICANT CHANGE UP (ref 3.8–10.5)
WBC # FLD AUTO: 9.3 K/UL — SIGNIFICANT CHANGE UP (ref 3.8–10.5)

## 2022-07-23 PROCEDURE — 99233 SBSQ HOSP IP/OBS HIGH 50: CPT

## 2022-07-23 RX ORDER — GABAPENTIN 400 MG/1
100 CAPSULE ORAL AT BEDTIME
Refills: 0 | Status: DISCONTINUED | OUTPATIENT
Start: 2022-07-23 | End: 2022-07-23

## 2022-07-23 RX ORDER — POLYETHYLENE GLYCOL 3350 17 G/17G
17 POWDER, FOR SOLUTION ORAL DAILY
Refills: 0 | Status: DISCONTINUED | OUTPATIENT
Start: 2022-07-23 | End: 2022-07-25

## 2022-07-23 RX ORDER — AMLODIPINE BESYLATE 2.5 MG/1
10 TABLET ORAL DAILY
Refills: 0 | Status: DISCONTINUED | OUTPATIENT
Start: 2022-07-23 | End: 2022-07-25

## 2022-07-23 RX ORDER — ATORVASTATIN CALCIUM 80 MG/1
40 TABLET, FILM COATED ORAL AT BEDTIME
Refills: 0 | Status: DISCONTINUED | OUTPATIENT
Start: 2022-07-23 | End: 2022-07-25

## 2022-07-23 RX ORDER — LISINOPRIL 2.5 MG/1
40 TABLET ORAL DAILY
Refills: 0 | Status: DISCONTINUED | OUTPATIENT
Start: 2022-07-23 | End: 2022-07-23

## 2022-07-23 RX ORDER — LANOLIN ALCOHOL/MO/W.PET/CERES
5 CREAM (GRAM) TOPICAL AT BEDTIME
Refills: 0 | Status: DISCONTINUED | OUTPATIENT
Start: 2022-07-23 | End: 2022-07-25

## 2022-07-23 RX ORDER — MAGNESIUM SULFATE 500 MG/ML
1 VIAL (ML) INJECTION ONCE
Refills: 0 | Status: COMPLETED | OUTPATIENT
Start: 2022-07-23 | End: 2022-07-23

## 2022-07-23 RX ORDER — GABAPENTIN 400 MG/1
100 CAPSULE ORAL AT BEDTIME
Refills: 0 | Status: DISCONTINUED | OUTPATIENT
Start: 2022-07-23 | End: 2022-07-25

## 2022-07-23 RX ORDER — FAMOTIDINE 10 MG/ML
20 INJECTION INTRAVENOUS DAILY
Refills: 0 | Status: DISCONTINUED | OUTPATIENT
Start: 2022-07-23 | End: 2022-07-25

## 2022-07-23 RX ORDER — LISINOPRIL 2.5 MG/1
40 TABLET ORAL EVERY 24 HOURS
Refills: 0 | Status: DISCONTINUED | OUTPATIENT
Start: 2022-07-23 | End: 2022-07-25

## 2022-07-23 RX ORDER — POLYETHYLENE GLYCOL 3350 17 G/17G
17 POWDER, FOR SOLUTION ORAL DAILY
Refills: 0 | Status: DISCONTINUED | OUTPATIENT
Start: 2022-07-23 | End: 2022-07-23

## 2022-07-23 RX ORDER — ACETAMINOPHEN 500 MG
650 TABLET ORAL EVERY 6 HOURS
Refills: 0 | Status: DISCONTINUED | OUTPATIENT
Start: 2022-07-23 | End: 2022-07-25

## 2022-07-23 RX ORDER — POTASSIUM CHLORIDE 20 MEQ
40 PACKET (EA) ORAL ONCE
Refills: 0 | Status: COMPLETED | OUTPATIENT
Start: 2022-07-23 | End: 2022-07-23

## 2022-07-23 RX ORDER — CLOPIDOGREL BISULFATE 75 MG/1
75 TABLET, FILM COATED ORAL DAILY
Refills: 0 | Status: DISCONTINUED | OUTPATIENT
Start: 2022-07-23 | End: 2022-07-25

## 2022-07-23 RX ORDER — BUPROPION HYDROCHLORIDE 150 MG/1
150 TABLET, EXTENDED RELEASE ORAL AT BEDTIME
Refills: 0 | Status: DISCONTINUED | OUTPATIENT
Start: 2022-07-23 | End: 2022-07-25

## 2022-07-23 RX ADMIN — PIPERACILLIN AND TAZOBACTAM 200 GRAM(S): 4; .5 INJECTION, POWDER, LYOPHILIZED, FOR SOLUTION INTRAVENOUS at 00:55

## 2022-07-23 RX ADMIN — ATORVASTATIN CALCIUM 40 MILLIGRAM(S): 80 TABLET, FILM COATED ORAL at 21:17

## 2022-07-23 RX ADMIN — PIPERACILLIN AND TAZOBACTAM 200 GRAM(S): 4; .5 INJECTION, POWDER, LYOPHILIZED, FOR SOLUTION INTRAVENOUS at 06:18

## 2022-07-23 RX ADMIN — POLYETHYLENE GLYCOL 3350 17 GRAM(S): 17 POWDER, FOR SOLUTION ORAL at 13:34

## 2022-07-23 RX ADMIN — Medication 100 GRAM(S): at 08:55

## 2022-07-23 RX ADMIN — CLOPIDOGREL BISULFATE 75 MILLIGRAM(S): 75 TABLET, FILM COATED ORAL at 13:34

## 2022-07-23 RX ADMIN — ENOXAPARIN SODIUM 30 MILLIGRAM(S): 100 INJECTION SUBCUTANEOUS at 17:25

## 2022-07-23 RX ADMIN — BUPROPION HYDROCHLORIDE 150 MILLIGRAM(S): 150 TABLET, EXTENDED RELEASE ORAL at 21:17

## 2022-07-23 RX ADMIN — Medication 40 MILLIEQUIVALENT(S): at 08:55

## 2022-07-23 RX ADMIN — PIPERACILLIN AND TAZOBACTAM 200 GRAM(S): 4; .5 INJECTION, POWDER, LYOPHILIZED, FOR SOLUTION INTRAVENOUS at 12:12

## 2022-07-23 RX ADMIN — FAMOTIDINE 20 MILLIGRAM(S): 10 INJECTION INTRAVENOUS at 13:34

## 2022-07-23 RX ADMIN — LISINOPRIL 40 MILLIGRAM(S): 2.5 TABLET ORAL at 13:34

## 2022-07-23 RX ADMIN — Medication 5 MILLIGRAM(S): at 21:17

## 2022-07-23 RX ADMIN — AMLODIPINE BESYLATE 10 MILLIGRAM(S): 2.5 TABLET ORAL at 13:34

## 2022-07-23 RX ADMIN — GABAPENTIN 100 MILLIGRAM(S): 400 CAPSULE ORAL at 21:17

## 2022-07-23 NOTE — PROGRESS NOTE ADULT - PROBLEM SELECTOR PLAN 11
F: none  E: replete to K>4, Mg>2, Phos>2.5  N: per nutrition recs through dobhoff  Ppx: lovenox 30mg qD    Code Status: DNR - MOLST in chart     Dispo: home vs home w/ hospice

## 2022-07-23 NOTE — PROGRESS NOTE ADULT - PROBLEM SELECTOR PLAN 2
UA with pyuria, bacteruria, LE, nitrites. Unable to assess if patient symptomatic however more altered/lethargic from baseline on admission. No leukocytosis, negative lactate, not meeting SIRS criteria.   - s/p zosyn 3.375g q6 IV for Pseudomonal coverage x 5 days (7/18-7/22)  - pt with good urine output, not retaining per bladder scans x2  - Zosyn to be stopped today.  - UCx: Pseudomonas  - BCx: gram + cocci in clusters, PCR coag neg  - BCx repeat: no growth  - Ustrep neg, U legionella pending

## 2022-07-23 NOTE — PROGRESS NOTE ADULT - SUBJECTIVE AND OBJECTIVE BOX
Patient is a 88y old  Female who presents with a chief complaint of AMS (22 Jul 2022 17:18)      SUBJECTIVE / OVERNIGHT EVENTS:  Aide at bedside.   Patient resting.  Remains with NGT and feeds.   Tolerating.  As per medical resident, was more awake this morning.    MEDICATIONS  (STANDING):  enoxaparin Injectable 30 milliGRAM(s) SubCutaneous every 24 hours  piperacillin/tazobactam IVPB.. 3.375 Gram(s) IV Intermittent every 6 hours    MEDICATIONS  (PRN):      CAPILLARY BLOOD GLUCOSE      POCT Blood Glucose.: 116 mg/dL (22 Jul 2022 16:51)    I&O's Summary      PHYSICAL EXAM:  Vital Signs Last 24 Hrs  T(C): 36.4 (23 Jul 2022 06:07), Max: 36.5 (22 Jul 2022 20:51)  T(F): 97.6 (23 Jul 2022 06:07), Max: 97.7 (22 Jul 2022 20:51)  HR: 83 (23 Jul 2022 06:07) (74 - 83)  BP: 155/81 (23 Jul 2022 06:07) (155/81 - 166/77)  BP(mean): --  RR: 17 (23 Jul 2022 06:07) (17 - 18)  SpO2: 100% (22 Jul 2022 20:51) (100% - 100%)    Parameters below as of 23 Jul 2022 06:07  Patient On (Oxygen Delivery Method): nasal cannula      GENERAL: NAD, frail  HEAD:  Atraumatic, Normocephalic  EYES: EOMI, PERRLA, conjunctiva and sclera clear  NECK: Supple, No JVD  CHEST/LUNG: Clear to auscultation bilaterally; No wheeze  HEART: Regular rate and rhythm; No murmurs, rubs, or gallops  ABDOMEN: Soft, Nontender, Nondistended; + NGT,  Bowel sounds present  EXTREMITIES:  2+ Peripheral Pulses, No clubbing, cyanosis, or edema  PSYCH: NAD  NEUROLOGY: non-focal  SKIN: No rashes or lesions    LABS:                        9.4    9.30  )-----------( 313      ( 23 Jul 2022 05:30 )             28.8     07-23    135  |  100  |  7   ----------------------------<  115<H>  3.6   |  24  |  0.58    Ca    9.4      23 Jul 2022 05:30  Phos  2.4     07-23  Mg     1.8     07-23                RADIOLOGY & ADDITIONAL TESTS:    Imaging Personally Reviewed:    Consultant(s) Notes Reviewed:      Care Discussed with Consultants/Other Providers:

## 2022-07-23 NOTE — PROGRESS NOTE ADULT - PROBLEM SELECTOR PLAN 6
Hx HTN. Blood pressures elevated during admission.  - home meds: amlodipine 10mg, ramipril 20mg qd (hold, NPO)  - consider hydral 5mg IV if BPs >180/100s    #HLD  - home meds: lipitor 40   - To be restarted today.

## 2022-07-23 NOTE — PROGRESS NOTE ADULT - PROBLEM SELECTOR PLAN 1
Pt s/p CVA, baseline non verbal w/ cognitive impairment and bedbound. Per HHA has been more altered/lethargic from baseline for few days with decreased PO intake likely 2/2 UTI (UA with pyuria, bacteriuria). UCx growing Pseudomonas s/p zosyn treatment 3.375g x5days.    Mental status this morning was improved.

## 2022-07-23 NOTE — PROGRESS NOTE ADULT - PROBLEM SELECTOR PLAN 3
Pt with decreased PO intake per HHA for few days leading up to admission. Speech and swallow evaluation x2, pt not able to initiate swallow. Pt with continued oral secretions, getting suctioned.  - f/u speech recs   - f/u nutrition recs  - Currently with NGT in place, feeds running.  - start tube feeds, per nutrition recs  - NPO, aspiration precautions  - POCT q6

## 2022-07-24 LAB
ALBUMIN SERPL ELPH-MCNC: 3.3 G/DL — SIGNIFICANT CHANGE UP (ref 3.3–5)
ALP SERPL-CCNC: 77 U/L — SIGNIFICANT CHANGE UP (ref 40–120)
ALT FLD-CCNC: 11 U/L — SIGNIFICANT CHANGE UP (ref 10–45)
ANION GAP SERPL CALC-SCNC: 11 MMOL/L — SIGNIFICANT CHANGE UP (ref 5–17)
AST SERPL-CCNC: 19 U/L — SIGNIFICANT CHANGE UP (ref 10–40)
BASOPHILS # BLD AUTO: 0.08 K/UL — SIGNIFICANT CHANGE UP (ref 0–0.2)
BASOPHILS NFR BLD AUTO: 0.8 % — SIGNIFICANT CHANGE UP (ref 0–2)
BILIRUB SERPL-MCNC: 0.3 MG/DL — SIGNIFICANT CHANGE UP (ref 0.2–1.2)
BUN SERPL-MCNC: 11 MG/DL — SIGNIFICANT CHANGE UP (ref 7–23)
CALCIUM SERPL-MCNC: 9.6 MG/DL — SIGNIFICANT CHANGE UP (ref 8.4–10.5)
CHLORIDE SERPL-SCNC: 101 MMOL/L — SIGNIFICANT CHANGE UP (ref 96–108)
CO2 SERPL-SCNC: 25 MMOL/L — SIGNIFICANT CHANGE UP (ref 22–31)
CREAT SERPL-MCNC: 0.67 MG/DL — SIGNIFICANT CHANGE UP (ref 0.5–1.3)
CULTURE RESULTS: SIGNIFICANT CHANGE UP
EGFR: 84 ML/MIN/1.73M2 — SIGNIFICANT CHANGE UP
EOSINOPHIL # BLD AUTO: 0.05 K/UL — SIGNIFICANT CHANGE UP (ref 0–0.5)
EOSINOPHIL NFR BLD AUTO: 0.5 % — SIGNIFICANT CHANGE UP (ref 0–6)
GLUCOSE BLDC GLUCOMTR-MCNC: 145 MG/DL — HIGH (ref 70–99)
GLUCOSE BLDC GLUCOMTR-MCNC: 148 MG/DL — HIGH (ref 70–99)
GLUCOSE BLDC GLUCOMTR-MCNC: 179 MG/DL — HIGH (ref 70–99)
GLUCOSE BLDC GLUCOMTR-MCNC: 180 MG/DL — HIGH (ref 70–99)
GLUCOSE SERPL-MCNC: 161 MG/DL — HIGH (ref 70–99)
HCT VFR BLD CALC: 30.9 % — LOW (ref 34.5–45)
HGB BLD-MCNC: 9.7 G/DL — LOW (ref 11.5–15.5)
IMM GRANULOCYTES NFR BLD AUTO: 0.5 % — SIGNIFICANT CHANGE UP (ref 0–1.5)
LYMPHOCYTES # BLD AUTO: 1.76 K/UL — SIGNIFICANT CHANGE UP (ref 1–3.3)
LYMPHOCYTES # BLD AUTO: 18.3 % — SIGNIFICANT CHANGE UP (ref 13–44)
MAGNESIUM SERPL-MCNC: 2 MG/DL — SIGNIFICANT CHANGE UP (ref 1.6–2.6)
MCHC RBC-ENTMCNC: 26.5 PG — LOW (ref 27–34)
MCHC RBC-ENTMCNC: 31.4 GM/DL — LOW (ref 32–36)
MCV RBC AUTO: 84.4 FL — SIGNIFICANT CHANGE UP (ref 80–100)
MONOCYTES # BLD AUTO: 0.85 K/UL — SIGNIFICANT CHANGE UP (ref 0–0.9)
MONOCYTES NFR BLD AUTO: 8.8 % — SIGNIFICANT CHANGE UP (ref 2–14)
NEUTROPHILS # BLD AUTO: 6.82 K/UL — SIGNIFICANT CHANGE UP (ref 1.8–7.4)
NEUTROPHILS NFR BLD AUTO: 71.1 % — SIGNIFICANT CHANGE UP (ref 43–77)
NRBC # BLD: 0 /100 WBCS — SIGNIFICANT CHANGE UP (ref 0–0)
PHOSPHATE SERPL-MCNC: 2.7 MG/DL — SIGNIFICANT CHANGE UP (ref 2.5–4.5)
PLATELET # BLD AUTO: 317 K/UL — SIGNIFICANT CHANGE UP (ref 150–400)
POTASSIUM SERPL-MCNC: 4 MMOL/L — SIGNIFICANT CHANGE UP (ref 3.5–5.3)
POTASSIUM SERPL-SCNC: 4 MMOL/L — SIGNIFICANT CHANGE UP (ref 3.5–5.3)
PROT SERPL-MCNC: 6.4 G/DL — SIGNIFICANT CHANGE UP (ref 6–8.3)
RBC # BLD: 3.66 M/UL — LOW (ref 3.8–5.2)
RBC # FLD: 14.9 % — HIGH (ref 10.3–14.5)
SARS-COV-2 RNA SPEC QL NAA+PROBE: SIGNIFICANT CHANGE UP
SODIUM SERPL-SCNC: 137 MMOL/L — SIGNIFICANT CHANGE UP (ref 135–145)
SPECIMEN SOURCE: SIGNIFICANT CHANGE UP
WBC # BLD: 9.61 K/UL — SIGNIFICANT CHANGE UP (ref 3.8–10.5)
WBC # FLD AUTO: 9.61 K/UL — SIGNIFICANT CHANGE UP (ref 3.8–10.5)

## 2022-07-24 PROCEDURE — 99233 SBSQ HOSP IP/OBS HIGH 50: CPT

## 2022-07-24 RX ADMIN — ENOXAPARIN SODIUM 30 MILLIGRAM(S): 100 INJECTION SUBCUTANEOUS at 17:04

## 2022-07-24 RX ADMIN — Medication 5 MILLIGRAM(S): at 21:18

## 2022-07-24 RX ADMIN — AMLODIPINE BESYLATE 10 MILLIGRAM(S): 2.5 TABLET ORAL at 06:38

## 2022-07-24 RX ADMIN — ATORVASTATIN CALCIUM 40 MILLIGRAM(S): 80 TABLET, FILM COATED ORAL at 21:18

## 2022-07-24 RX ADMIN — POLYETHYLENE GLYCOL 3350 17 GRAM(S): 17 POWDER, FOR SOLUTION ORAL at 12:27

## 2022-07-24 RX ADMIN — BUPROPION HYDROCHLORIDE 150 MILLIGRAM(S): 150 TABLET, EXTENDED RELEASE ORAL at 21:19

## 2022-07-24 RX ADMIN — CLOPIDOGREL BISULFATE 75 MILLIGRAM(S): 75 TABLET, FILM COATED ORAL at 12:27

## 2022-07-24 RX ADMIN — GABAPENTIN 100 MILLIGRAM(S): 400 CAPSULE ORAL at 21:19

## 2022-07-24 RX ADMIN — LISINOPRIL 40 MILLIGRAM(S): 2.5 TABLET ORAL at 12:28

## 2022-07-24 RX ADMIN — FAMOTIDINE 20 MILLIGRAM(S): 10 INJECTION INTRAVENOUS at 12:27

## 2022-07-24 NOTE — PROGRESS NOTE ADULT - PROBLEM SELECTOR PLAN 4
Hx CVA and dementia. Home med ASA, plavix. Non verbal bedbound at baseline. Ongoing goals of care conversations with sister health care proxy.  - home meds: asa 81mg & plavix (restart w/ NGT)  - palliative consult  - ethics consult  - neuro consult: swallow function may recover

## 2022-07-24 NOTE — PROGRESS NOTE ADULT - PROBLEM SELECTOR PLAN 1
Pt s/p CVA, baseline non verbal w/ cognitive impairment and bedbound. Per HHA has been more altered/lethargic from baseline for few days with decreased PO intake likely 2/2 UTI (UA with pyuria, bacteriuria). UCx growing Pseudomonas s/p zosyn treatment 3.375g x5days.    Pt mental status improving through admission, though not at baseline. Pt with decreased PO intake per HHA for few days leading up to admission. Speech and swallow evaluation x2, pt not able to initiate swallow. Pt with continued oral secretions, getting suctioned.   - f/u speech recs   - f/u nutrition recs  - dobhoff tube placement 7/22  - pt tolerating tube feeds well  - speech and swallow eval 7/25  - NPO, aspiration precautions  - POCT q6

## 2022-07-24 NOTE — PROGRESS NOTE ADULT - PROBLEM SELECTOR PLAN 3
Pt with decreased PO intake per HHA for few days leading up to admission. Speech and swallow evaluation x2, pt not able to initiate swallow. Pt with continued oral secretions, getting suctioned.  - f/u speech recs   - f/u nutrition recs  - dobhoff tube placement 7/22  - start tube feeds, per nutrition recs  - d/c D5 maintenance fluids  - NPO, aspiration precautions  - POCT q6 *RESOLVED  UA with pyuria, bacteruria, LE, nitrites. Unable to assess if patient symptomatic however more altered/lethargic from baseline on admission. No leukocytosis, negative lactate, not meeting SIRS criteria.   - s/p zosyn 3.375g q6 IV for Pseudomonal coverage x 5 days (7/18-7/22)  - pt with good urine output, not retaining per bladder scans x2    - UCx: Pseudomonas  - BCx: gram + cocci in clusters, PCR coag neg  - BCx repeat: no growth  - Ustrep neg, U legionella pending

## 2022-07-24 NOTE — PROGRESS NOTE ADULT - PROBLEM SELECTOR PLAN 2
UA with pyuria, bacteruria, LE, nitrites. Unable to assess if patient symptomatic however more altered/lethargic from baseline on admission. No leukocytosis, negative lactate, not meeting SIRS criteria.   - s/p zosyn 3.375g q6 IV for Pseudomonal coverage x 5 days (7/18-7/22)  - pt with good urine output, not retaining per bladder scans x2    - UCx: Pseudomonas  - BCx: gram + cocci in clusters, PCR coag neg  - BCx repeat: no growth  - Ustrep neg, U legionella pending Pt s/p CVA, baseline non verbal w/ cognitive impairment and bedbound. Per HHA has been more altered/lethargic from baseline for few days with decreased PO intake likely 2/2 UTI (UA with pyuria, bacteriuria). UCx growing Pseudomonas s/p zosyn treatment 3.375g x5days.    Pt mental status improving through admission.

## 2022-07-24 NOTE — PROGRESS NOTE ADULT - SUBJECTIVE AND OBJECTIVE BOX
SUBJECTIVE / INTERVAL HPI: Patient seen and examined at bedside. No overnight events. Per HHA at bedside, pt slept peacefully. Pt unable to participate in ROS though appears stronger and more arrousable compared to admission.    VITAL SIGNS:  Vital Signs Last 24 Hrs  T(C): 36.4 (24 Jul 2022 12:27), Max: 37.1 (23 Jul 2022 20:53)  T(F): 97.5 (24 Jul 2022 12:27), Max: 98.7 (23 Jul 2022 20:53)  HR: 87 (24 Jul 2022 12:27) (80 - 87)  BP: 166/80 (24 Jul 2022 12:27) (132/61 - 166/80)  BP(mean): --  RR: 18 (24 Jul 2022 12:27) (18 - 19)  SpO2: 100% (24 Jul 2022 12:27) (94% - 100%)    Parameters below as of 24 Jul 2022 12:27  Patient On (Oxygen Delivery Method): nasal cannula  O2 Flow (L/min): 2      PHYSICAL EXAM:    General: NAD  HEENT: NC/AT; anicteric sclera; dry mucous membranes  Neck: stiff  Cardiovascular: RRR, holosystolic murmur  Respiratory: CTA B/L; no W/R/R  Gastrointestinal: soft, NT/ND; +BSx4  Extremities: WWP; no edema  Vascular: 2+ radial pulses BL  Neurological: AAOx0; bedbound; contractions in all 4 extremities; pt vocal responses not intelligble; pt not following commands    MEDICATIONS:  MEDICATIONS  (STANDING):  amLODIPine   Tablet 10 milliGRAM(s) Oral daily  atorvastatin 40 milliGRAM(s) Oral at bedtime  buPROPion XL (24-Hour) . 150 milliGRAM(s) Oral at bedtime  clopidogrel Tablet 75 milliGRAM(s) Oral daily  enoxaparin Injectable 30 milliGRAM(s) SubCutaneous every 24 hours  famotidine    Tablet 20 milliGRAM(s) Oral daily  gabapentin Solution 100 milliGRAM(s) Enteral Tube at bedtime  lisinopril 40 milliGRAM(s) Oral every 24 hours  melatonin 5 milliGRAM(s) Oral at bedtime  polyethylene glycol 3350 17 Gram(s) Oral daily    MEDICATIONS  (PRN):  acetaminophen    Suspension .. 650 milliGRAM(s) Oral every 6 hours PRN Moderate Pain (4 - 6)      ALLERGIES:  Allergies    morphine (Other)    Intolerances        LABS:                        9.7    9.61  )-----------( 317      ( 24 Jul 2022 07:00 )             30.9     07-24    137  |  101  |  11  ----------------------------<  161<H>  4.0   |  25  |  0.67    Ca    9.6      24 Jul 2022 07:00  Phos  2.7     07-24  Mg     2.0     07-24    TPro  6.4  /  Alb  3.3  /  TBili  0.3  /  DBili  x   /  AST  19  /  ALT  11  /  AlkPhos  77  07-24        CAPILLARY BLOOD GLUCOSE      POCT Blood Glucose.: 180 mg/dL (24 Jul 2022 11:36)      RADIOLOGY & ADDITIONAL TESTS: Reviewed.

## 2022-07-24 NOTE — PROGRESS NOTE ADULT - PROBLEM SELECTOR PLAN 6
Hx HTN. Blood pressures elevated during admission.  - home meds: amlodipine 10mg, ramipril 20mg qd (hold, NPO)  - consider hydral 5mg IV if BPs >180/100s    #HLD  - home meds: lipitor 40 (restart w/ dobhoff) Hx HTN. Blood pressures elevated during admission.  - home meds: amlodipine 10mg, ramipril 20mg qd  - Currently on Norvasc 10mg and Lisinopril 40mg.     #HLD  - home meds: lipitor 40.

## 2022-07-25 LAB
ANION GAP SERPL CALC-SCNC: 8 MMOL/L — SIGNIFICANT CHANGE UP (ref 5–17)
BUN SERPL-MCNC: 16 MG/DL — SIGNIFICANT CHANGE UP (ref 7–23)
CALCIUM SERPL-MCNC: 9.5 MG/DL — SIGNIFICANT CHANGE UP (ref 8.4–10.5)
CHLORIDE SERPL-SCNC: 102 MMOL/L — SIGNIFICANT CHANGE UP (ref 96–108)
CO2 SERPL-SCNC: 26 MMOL/L — SIGNIFICANT CHANGE UP (ref 22–31)
CREAT SERPL-MCNC: 0.56 MG/DL — SIGNIFICANT CHANGE UP (ref 0.5–1.3)
EGFR: 88 ML/MIN/1.73M2 — SIGNIFICANT CHANGE UP
GLUCOSE BLDC GLUCOMTR-MCNC: 115 MG/DL — HIGH (ref 70–99)
GLUCOSE BLDC GLUCOMTR-MCNC: 176 MG/DL — HIGH (ref 70–99)
GLUCOSE BLDC GLUCOMTR-MCNC: 179 MG/DL — HIGH (ref 70–99)
GLUCOSE SERPL-MCNC: 196 MG/DL — HIGH (ref 70–99)
HCT VFR BLD CALC: 30 % — LOW (ref 34.5–45)
HGB BLD-MCNC: 9.3 G/DL — LOW (ref 11.5–15.5)
MAGNESIUM SERPL-MCNC: 1.8 MG/DL — SIGNIFICANT CHANGE UP (ref 1.6–2.6)
MCHC RBC-ENTMCNC: 26.7 PG — LOW (ref 27–34)
MCHC RBC-ENTMCNC: 31 GM/DL — LOW (ref 32–36)
MCV RBC AUTO: 86.2 FL — SIGNIFICANT CHANGE UP (ref 80–100)
NRBC # BLD: 0 /100 WBCS — SIGNIFICANT CHANGE UP (ref 0–0)
PHOSPHATE SERPL-MCNC: 2.6 MG/DL — SIGNIFICANT CHANGE UP (ref 2.5–4.5)
PLATELET # BLD AUTO: 305 K/UL — SIGNIFICANT CHANGE UP (ref 150–400)
POTASSIUM SERPL-MCNC: 4 MMOL/L — SIGNIFICANT CHANGE UP (ref 3.5–5.3)
POTASSIUM SERPL-SCNC: 4 MMOL/L — SIGNIFICANT CHANGE UP (ref 3.5–5.3)
RBC # BLD: 3.48 M/UL — LOW (ref 3.8–5.2)
RBC # FLD: 15.2 % — HIGH (ref 10.3–14.5)
SODIUM SERPL-SCNC: 136 MMOL/L — SIGNIFICANT CHANGE UP (ref 135–145)
WBC # BLD: 7.89 K/UL — SIGNIFICANT CHANGE UP (ref 3.8–10.5)
WBC # FLD AUTO: 7.89 K/UL — SIGNIFICANT CHANGE UP (ref 3.8–10.5)

## 2022-07-25 PROCEDURE — 99233 SBSQ HOSP IP/OBS HIGH 50: CPT | Mod: GC

## 2022-07-25 PROCEDURE — 99232 SBSQ HOSP IP/OBS MODERATE 35: CPT

## 2022-07-25 RX ORDER — SODIUM CHLORIDE 9 MG/ML
1000 INJECTION, SOLUTION INTRAVENOUS
Refills: 0 | Status: DISCONTINUED | OUTPATIENT
Start: 2022-07-25 | End: 2022-07-26

## 2022-07-25 RX ORDER — SODIUM CHLORIDE 9 MG/ML
500 INJECTION, SOLUTION INTRAVENOUS
Refills: 0 | Status: DISCONTINUED | OUTPATIENT
Start: 2022-07-25 | End: 2022-07-26

## 2022-07-25 RX ADMIN — POLYETHYLENE GLYCOL 3350 17 GRAM(S): 17 POWDER, FOR SOLUTION ORAL at 11:30

## 2022-07-25 RX ADMIN — AMLODIPINE BESYLATE 10 MILLIGRAM(S): 2.5 TABLET ORAL at 06:41

## 2022-07-25 RX ADMIN — LISINOPRIL 40 MILLIGRAM(S): 2.5 TABLET ORAL at 11:30

## 2022-07-25 RX ADMIN — SODIUM CHLORIDE 40 MILLILITER(S): 9 INJECTION, SOLUTION INTRAVENOUS at 19:37

## 2022-07-25 RX ADMIN — CLOPIDOGREL BISULFATE 75 MILLIGRAM(S): 75 TABLET, FILM COATED ORAL at 11:30

## 2022-07-25 RX ADMIN — FAMOTIDINE 20 MILLIGRAM(S): 10 INJECTION INTRAVENOUS at 11:30

## 2022-07-25 RX ADMIN — ENOXAPARIN SODIUM 30 MILLIGRAM(S): 100 INJECTION SUBCUTANEOUS at 18:03

## 2022-07-25 RX ADMIN — SODIUM CHLORIDE 250 MILLILITER(S): 9 INJECTION, SOLUTION INTRAVENOUS at 10:27

## 2022-07-25 NOTE — PROGRESS NOTE ADULT - SUBJECTIVE AND OBJECTIVE BOX
SUBJECTIVE / INTERVAL HPI: Patient seen and examined at bedside. No overnight events.    VITAL SIGNS:  Vital Signs Last 24 Hrs  T(C): 36.7 (25 Jul 2022 06:07), Max: 36.7 (25 Jul 2022 06:07)  T(F): 98 (25 Jul 2022 06:07), Max: 98 (25 Jul 2022 06:07)  HR: 84 (25 Jul 2022 06:07) (84 - 94)  BP: 147/78 (25 Jul 2022 06:07) (137/72 - 166/80)  BP(mean): --  RR: 18 (25 Jul 2022 06:07) (18 - 19)  SpO2: 98% (25 Jul 2022 06:07) (94% - 100%)    Parameters below as of 24 Jul 2022 20:34  Patient On (Oxygen Delivery Method): room air        PHYSICAL EXAM:    General: Resting comfortably in bed; NAD  HEENT: NC/AT, EOMI, anicteric sclera, MMM, neck supple, no nasal discharge  Cardiac: RRR; normal S1/S2, no MRG, no LE edema, no JVD  Respiratory: CTAB; no wheezes, ronchi, increased work of breathing, retractions  Gastrointestinal: +BSx4, abdomen soft, NT/ND; no rebound or guarding  Genitourinary: no suprapubic tenderness; callejas*; normal external genitalia  Extremities: WWP, no clubbing or cyanosis; no peripheral edema  Vascular: 2+ radial and DP pulses bilaterally  Dermatologic: skin warm, dry and intact; no rashes, open wounds  Neurologic: AAOx3; no focal deficits  Psychiatric: affect and characteristics of appearance, verbalizations, behaviors are appropriate    MEDICATIONS:  MEDICATIONS  (STANDING):  amLODIPine   Tablet 10 milliGRAM(s) Oral daily  atorvastatin 40 milliGRAM(s) Oral at bedtime  buPROPion XL (24-Hour) . 150 milliGRAM(s) Oral at bedtime  clopidogrel Tablet 75 milliGRAM(s) Oral daily  enoxaparin Injectable 30 milliGRAM(s) SubCutaneous every 24 hours  famotidine    Tablet 20 milliGRAM(s) Oral daily  gabapentin Solution 100 milliGRAM(s) Enteral Tube at bedtime  lisinopril 40 milliGRAM(s) Oral every 24 hours  melatonin 5 milliGRAM(s) Oral at bedtime  polyethylene glycol 3350 17 Gram(s) Oral daily    MEDICATIONS  (PRN):  acetaminophen    Suspension .. 650 milliGRAM(s) Oral every 6 hours PRN Moderate Pain (4 - 6)      ALLERGIES:  Allergies    morphine (Other)    Intolerances        LABS:                        9.7    9.61  )-----------( 317      ( 24 Jul 2022 07:00 )             30.9     07-24    137  |  101  |  11  ----------------------------<  161<H>  4.0   |  25  |  0.67    Ca    9.6      24 Jul 2022 07:00  Phos  2.7     07-24  Mg     2.0     07-24    TPro  6.4  /  Alb  3.3  /  TBili  0.3  /  DBili  x   /  AST  19  /  ALT  11  /  AlkPhos  77  07-24        CAPILLARY BLOOD GLUCOSE      POCT Blood Glucose.: 145 mg/dL (24 Jul 2022 22:15)      RADIOLOGY & ADDITIONAL TESTS: Reviewed.   SUBJECTIVE / INTERVAL HPI: Patient seen and examined at bedside. No overnight events. Pt bladder scan with 360mL s/p straight cath. This bladder scan was not post void. No other events. No oral secretions, no suctioning. Pt denies any pain this morning.    VITAL SIGNS:  Vital Signs Last 24 Hrs  T(C): 36.7 (25 Jul 2022 06:07), Max: 36.7 (25 Jul 2022 06:07)  T(F): 98 (25 Jul 2022 06:07), Max: 98 (25 Jul 2022 06:07)  HR: 84 (25 Jul 2022 06:07) (84 - 94)  BP: 147/78 (25 Jul 2022 06:07) (137/72 - 166/80)  BP(mean): --  RR: 18 (25 Jul 2022 06:07) (18 - 19)  SpO2: 98% (25 Jul 2022 06:07) (94% - 100%)    Parameters below as of 24 Jul 2022 20:34  Patient On (Oxygen Delivery Method): room air        PHYSICAL EXAM:    General: Resting comfortably in bed; NAD; NG tube in place  HEENT: NC/AT, anicteric sclera, dry MM, neck stiff, NG tube in place  Cardiac: RRR; holosystolic murmur, normal S1, muted S2, LE edema; pt with supraclavicular skin tenting, urine graham  Respiratory: CTAB anteriorly, mouth breather w/ transmitted noise  Gastrointestinal: +BSx4, abdomen soft, NT/ND; no rebound or guarding  Genitourinary: no suprapubic tenderness; external callejas  Extremities: WWP, no peripheral edema  Vascular: 2+ radial pulses bilaterally  Dermatologic: skin warm, dry and intact; no rashes, open wounds  Neurologic: AAOx0; contractures in all 4 extremities; answers yes/no questions, intermittently follows verbal commands    MEDICATIONS:  MEDICATIONS  (STANDING):  amLODIPine   Tablet 10 milliGRAM(s) Oral daily  atorvastatin 40 milliGRAM(s) Oral at bedtime  buPROPion XL (24-Hour) . 150 milliGRAM(s) Oral at bedtime  clopidogrel Tablet 75 milliGRAM(s) Oral daily  enoxaparin Injectable 30 milliGRAM(s) SubCutaneous every 24 hours  famotidine    Tablet 20 milliGRAM(s) Oral daily  gabapentin Solution 100 milliGRAM(s) Enteral Tube at bedtime  lisinopril 40 milliGRAM(s) Oral every 24 hours  melatonin 5 milliGRAM(s) Oral at bedtime  polyethylene glycol 3350 17 Gram(s) Oral daily    MEDICATIONS  (PRN):  acetaminophen    Suspension .. 650 milliGRAM(s) Oral every 6 hours PRN Moderate Pain (4 - 6)      ALLERGIES:  Allergies    morphine (Other)    Intolerances        LABS:                        9.7    9.61  )-----------( 317      ( 24 Jul 2022 07:00 )             30.9     07-24    137  |  101  |  11  ----------------------------<  161<H>  4.0   |  25  |  0.67    Ca    9.6      24 Jul 2022 07:00  Phos  2.7     07-24  Mg     2.0     07-24    TPro  6.4  /  Alb  3.3  /  TBili  0.3  /  DBili  x   /  AST  19  /  ALT  11  /  AlkPhos  77  07-24        CAPILLARY BLOOD GLUCOSE      POCT Blood Glucose.: 145 mg/dL (24 Jul 2022 22:15)      RADIOLOGY & ADDITIONAL TESTS: Reviewed.

## 2022-07-25 NOTE — SWALLOW BEDSIDE ASSESSMENT ADULT - SWALLOW EVAL: PROGNOSIS
Poor for improvement in swallowing d/t h/o multiple medical problems & inability to participate in tx

## 2022-07-25 NOTE — PROGRESS NOTE ADULT - PROBLEM SELECTOR PLAN 6
Hx HTN. Blood pressures elevated during admission.  - home meds: amlodipine 10mg, ramipril 20mg qd  - Currently on Norvasc 10mg and Lisinopril 40mg.     #HLD  - home meds: lipitor 40.

## 2022-07-25 NOTE — PROGRESS NOTE ADULT - PROBLEM SELECTOR PLAN 10
Hx DM, no currently on any medication. A1C 4.8 (7/17/2022).  - SSI w/ tube feeds Hx DM, no currently on any medication. A1C 4.8 (7/17/2022).  - SSI w/ tube feeds  - poct q6 given NPO

## 2022-07-25 NOTE — PROGRESS NOTE ADULT - ASSESSMENT
89 yo with ho of HTN, HLD, multiple CVAs (bed bound w/ 24/7 HHA, non verbal, cognitive impairment) brought in after being found altered and decreased PO intake, found to have UTI and new O2 requirements.     87 yo with ho of HTN, HLD, multiple CVAs (bed bound w/ 24/7 HHA, non verbal, cognitive impairment) brought in after being found altered and decreased PO intake, found to have UTI s/p treatment and new O2 requirements (now resolved), now being assessed for dysphagia with NG tube and ongoing goals of care discussions.

## 2022-07-25 NOTE — PROGRESS NOTE ADULT - ASSESSMENT
89 yo F with ho of multiple CVAs, vascular dementia, HTN, HLD, DM brought in by HHA after being found altered and decreased PO intake, found to have UTI and new O2 requirements.   He baseline (per HHA):  bed bound w/ 24/7 HHA, non verbal with only "yes" and "no", was able to follow very simple verbal commands, eating and swallowing well. On today's exam she is bed-bound, frail lady with temporal wasting, lethargic but arousable on verbal stimuli, occasionally verbally responding and following simple verbal commands, moderate to severe muscle contractions x 4 with limited ROM in UE and LE, gaze preference to R. Per official SLP evaluation today, she's still not able to initiate swallowing. Considering acute onset of dysphagia on the settings of active UTI and a Hx of multiple CVA, bedbound state with muscle contractions, severe dementia she was encephalopathic due to UTI. Attempt to c/w NGT with concurrent tx of her current infection is being made; so far, not too much improvement in her mentation and she is not at her previous baseline. Considering hospice care. Palliative care on board.     - c/w current Tx  - daily speech and swallow evaluation  - consider hospice care, discuss GOC w family members      The note is not active w/o attending confirmation.      89 yo F with ho of multiple CVAs, vascular dementia, HTN, HLD, DM brought in by HHA after being found altered and decreased PO intake, found to have UTI and new O2 requirements.   He baseline (per HHA):  bed bound w/ 24/7 HHA, non verbal with only "yes" and "no", was able to follow very simple verbal commands, eating and swallowing well. On today's exam she is bed-bound, frail lady with temporal wasting, lethargic but arousable on verbal stimuli, occasionally verbally responding and following simple verbal commands, moderate to severe muscle contractions x 4 with limited ROM in UE and LE, gaze preference to R. Per official SLP evaluation today, she's still not able to initiate swallowing. Considering acute onset of dysphagia on the settings of active UTI and a Hx of multiple CVA, bedbound state with muscle contractures, severe dementia and encephalopathic due to UTI. Attempt to c/w NGT with concurrent tx of her current infection is being made; so far, not too much improvement in her mentation and she is not at her previous baseline. Considering hospice care. Palliative care on board.     - c/w current Tx  - f/u speech and swallow evaluation  - continue GOC discussion w family members

## 2022-07-25 NOTE — PROGRESS NOTE ADULT - PROBLEM SELECTOR PLAN 3
*RESOLVED  UA with pyuria, bacteruria, LE, nitrites. Unable to assess if patient symptomatic however more altered/lethargic from baseline on admission. No leukocytosis, negative lactate, not meeting SIRS criteria.   - s/p zosyn 3.375g q6 IV for Pseudomonal coverage x 5 days (7/18-7/22)  - pt with good urine output, not retaining per bladder scans x2    - UCx: Pseudomonas  - BCx: gram + cocci in clusters, PCR coag neg  - BCx repeat: no growth  - Ustrep neg, U legionella pending *RESOLVED  UA with pyuria, bacteruria, LE, nitrites. Unable to assess if patient symptomatic however more altered/lethargic from baseline on admission. No leukocytosis, negative lactate, not meeting SIRS criteria.   - s/p zosyn 3.375g q6 IV for Pseudomonal coverage x 5 days (7/18-7/22)    - UCx: Pseudomonas  - BCx: gram + cocci in clusters, PCR coag neg  - BCx repeat: no growth  - Ustrep neg, U legionella pending

## 2022-07-25 NOTE — SWALLOW BEDSIDE ASSESSMENT ADULT - SWALLOW EVAL: DIAGNOSIS
Profound oropharyngeal dysphagia w constant open-mouth posture & nearly absent pharyngeal swallow trigger.

## 2022-07-25 NOTE — PROGRESS NOTE ADULT - SUBJECTIVE AND OBJECTIVE BOX
Neurology Progress Note    Interval History:    Patient was seen and examined, no acute event over night.     Medications:  acetaminophen    Suspension .. 650 milliGRAM(s) Oral every 6 hours PRN  amLODIPine   Tablet 10 milliGRAM(s) Oral daily  atorvastatin 40 milliGRAM(s) Oral at bedtime  buPROPion XL (24-Hour) . 150 milliGRAM(s) Oral at bedtime  clopidogrel Tablet 75 milliGRAM(s) Oral daily  enoxaparin Injectable 30 milliGRAM(s) SubCutaneous every 24 hours  famotidine    Tablet 20 milliGRAM(s) Oral daily  gabapentin Solution 100 milliGRAM(s) Enteral Tube at bedtime  lactated ringers. 500 milliLiter(s) IV Continuous <Continuous>  lisinopril 40 milliGRAM(s) Oral every 24 hours  melatonin 5 milliGRAM(s) Oral at bedtime  polyethylene glycol 3350 17 Gram(s) Oral daily      Vital Signs Last 24 Hrs  T(C): 36.8 (25 Jul 2022 14:10), Max: 36.8 (25 Jul 2022 14:10)  T(F): 98.2 (25 Jul 2022 14:10), Max: 98.2 (25 Jul 2022 14:10)  HR: 80 (25 Jul 2022 14:10) (80 - 94)  BP: 149/72 (25 Jul 2022 14:10) (137/72 - 149/72)  BP(mean): --  RR: 18 (25 Jul 2022 14:10) (18 - 19)  SpO2: 99% (25 Jul 2022 14:10) (94% - 99%)    Parameters below as of 25 Jul 2022 14:10  Patient On (Oxygen Delivery Method): room air      Physical exam:   GEN: NAD, frail lady, mouth is wide open with noisy breathing; with nasogastric tube with cont tube feeding.   MENTAL STATUS: AAOx0, lethargic but easily arousable to verbal stimuli, able to follow simple commands  LANG/SPEECH: dysarthric and low sound speech with uncomprehendible words  CRANIAL NERVES:  II: Pupils equal and reactive, no RAPD  III, IV, VI: EOM intact, , gaze preference to right  V: normal  VII: no facial asymmetry  VIII: can hear and follow some commands  MOTOR: 4/5 in both upper with hypertonus L>R, 1/5 lower extremities  REFLEXES: 1/4 throughout, bilateral flexor plantars  SENSORY: Unable to assess.   COORD: unable to assess    Labs:  CBC Full  -  ( 25 Jul 2022 07:22 )  WBC Count : 7.89 K/uL  RBC Count : 3.48 M/uL  Hemoglobin : 9.3 g/dL  Hematocrit : 30.0 %  Platelet Count - Automated : 305 K/uL  Mean Cell Volume : 86.2 fl  Mean Cell Hemoglobin : 26.7 pg  Mean Cell Hemoglobin Concentration : 31.0 gm/dL      07-25    136  |  102  |  16  ----------------------------<  196<H>  4.0   |  26  |  0.56    Ca    9.5      25 Jul 2022 07:22  Phos  2.6     07-25  Mg     1.8     07-25    TPro  6.4  /  Alb  3.3  /  TBili  0.3  /  DBili  x   /  AST  19  /  ALT  11  /  AlkPhos  77  07-24    LIVER FUNCTIONS - ( 24 Jul 2022 07:00 )  Alb: 3.3 g/dL / Pro: 6.4 g/dL / ALK PHOS: 77 U/L / ALT: 11 U/L / AST: 19 U/L / GGT: x                 RADIOLOGY & ADDITIONAL TESTS:   Neurology Progress Note    Interval History:    Patient was seen and examined, no acute events over night.     Medications:  acetaminophen    Suspension .. 650 milliGRAM(s) Oral every 6 hours PRN  amLODIPine   Tablet 10 milliGRAM(s) Oral daily  atorvastatin 40 milliGRAM(s) Oral at bedtime  buPROPion XL (24-Hour) . 150 milliGRAM(s) Oral at bedtime  clopidogrel Tablet 75 milliGRAM(s) Oral daily  enoxaparin Injectable 30 milliGRAM(s) SubCutaneous every 24 hours  famotidine    Tablet 20 milliGRAM(s) Oral daily  gabapentin Solution 100 milliGRAM(s) Enteral Tube at bedtime  lactated ringers. 500 milliLiter(s) IV Continuous <Continuous>  lisinopril 40 milliGRAM(s) Oral every 24 hours  melatonin 5 milliGRAM(s) Oral at bedtime  polyethylene glycol 3350 17 Gram(s) Oral daily      Vital Signs Last 24 Hrs  T(C): 36.8 (25 Jul 2022 14:10), Max: 36.8 (25 Jul 2022 14:10)  T(F): 98.2 (25 Jul 2022 14:10), Max: 98.2 (25 Jul 2022 14:10)  HR: 80 (25 Jul 2022 14:10) (80 - 94)  BP: 149/72 (25 Jul 2022 14:10) (137/72 - 149/72)  BP(mean): --  RR: 18 (25 Jul 2022 14:10) (18 - 19)  SpO2: 99% (25 Jul 2022 14:10) (94% - 99%)    Parameters below as of 25 Jul 2022 14:10  Patient On (Oxygen Delivery Method): room air      Physical exam:   GEN: NAD, frail lady, mouth is wide open with noisy breathing; with nasogastric tube with cont tube feeding.   MENTAL STATUS: AAOx0, lethargic but easily arousable to verbal stimuli, able to follow simple commands  LANG/SPEECH: dysarthric and low sound speech with incomprehensible words  CRANIAL NERVES:  II: Pupils equal and reactive, no RAPD  III, IV, VI: EOM intact, , gaze preference to right  V: normal  VII: no facial asymmetry  VIII: can hear and follow some commands  MOTOR: 4/5 in both upper with hypertonus L>R, 1/5 lower extremities  REFLEXES: 1/4 throughout, bilateral flexor plantars  SENSORY: Unable to assess.   COORD: unable to assess    Labs:  CBC Full  -  ( 25 Jul 2022 07:22 )  WBC Count : 7.89 K/uL  RBC Count : 3.48 M/uL  Hemoglobin : 9.3 g/dL  Hematocrit : 30.0 %  Platelet Count - Automated : 305 K/uL  Mean Cell Volume : 86.2 fl  Mean Cell Hemoglobin : 26.7 pg  Mean Cell Hemoglobin Concentration : 31.0 gm/dL      07-25    136  |  102  |  16  ----------------------------<  196<H>  4.0   |  26  |  0.56    Ca    9.5      25 Jul 2022 07:22  Phos  2.6     07-25  Mg     1.8     07-25    TPro  6.4  /  Alb  3.3  /  TBili  0.3  /  DBili  x   /  AST  19  /  ALT  11  /  AlkPhos  77  07-24    LIVER FUNCTIONS - ( 24 Jul 2022 07:00 )  Alb: 3.3 g/dL / Pro: 6.4 g/dL / ALK PHOS: 77 U/L / ALT: 11 U/L / AST: 19 U/L / GGT: x

## 2022-07-25 NOTE — PROGRESS NOTE ADULT - PROBLEM SELECTOR PLAN 1
Pt with decreased PO intake per HHA for few days leading up to admission. Speech and swallow evaluation x2, pt not able to initiate swallow. Pt with continued oral secretions, getting suctioned.   - f/u speech recs   - f/u nutrition recs  - dobhoff tube placement 7/22  - pt tolerating tube feeds well  - speech and swallow eval 7/25  - NPO, aspiration precautions  - POCT q6 Pt with decreased PO intake per HHA for few days leading up to admission. Speech and swallow evaluation x2, pt not able to initiate swallow. Dobhoff tube in place, pt tolerating feeds well.   - f/u speech recs   - f/u nutrition recs, continue tube feeds  - dobhoff tube placement 7/22  - speech and swallow eval 7/25  - NPO, aspiration precautions  - POCT q6  - pt appears dry on exam; fluid bolus***    - plan to reassess dysphagia and continue goals of care discussion with family Pt with decreased PO intake per HHA for few days leading up to admission. Speech and swallow evaluation x2, pt not able to initiate swallow. Dobhoff tube in place this morning, pt tolerating feeds well.   - d/c tube feeds and remove NG tube for repeat speech and swallow eval 7/25  - continue goals of care discussions, palliative on board    - f/u speech recs   - f/u nutrition recs  - dobhoff tube placement 7/22, remove today  - speech and swallow eval 7/25  - NPO, aspiration precautions  - POCT q6  - pt appears dry on exam; fluid bolus 500cc LR Pt with decreased PO intake per HHA for few days leading up to admission. Speech and swallow evaluation x2, pt not able to initiate swallow. Dobhoff tube in place this morning, pt tolerating feeds well.   - d/c tube feeds and remove NG tube for repeat speech and swallow eval 7/25  - continue goals of care discussions, palliative on board  - plan for family meeting at 3:30pm 7/25    - f/u speech recs   - f/u nutrition recs  - dobhoff tube placement 7/22, remove today  - restart maintenance fluids   - speech and swallow eval 7/25  - NPO, aspiration precautions  - POCT q6  - pt appears dry on exam; fluid bolus 500cc LR

## 2022-07-26 DIAGNOSIS — K11.7 DISTURBANCES OF SALIVARY SECRETION: ICD-10-CM

## 2022-07-26 DIAGNOSIS — R19.7 DIARRHEA, UNSPECIFIED: ICD-10-CM

## 2022-07-26 LAB
ANION GAP SERPL CALC-SCNC: 8 MMOL/L — SIGNIFICANT CHANGE UP (ref 5–17)
BASOPHILS # BLD AUTO: 0.07 K/UL — SIGNIFICANT CHANGE UP (ref 0–0.2)
BASOPHILS NFR BLD AUTO: 1 % — SIGNIFICANT CHANGE UP (ref 0–2)
BUN SERPL-MCNC: 11 MG/DL — SIGNIFICANT CHANGE UP (ref 7–23)
CALCIUM SERPL-MCNC: 9.8 MG/DL — SIGNIFICANT CHANGE UP (ref 8.4–10.5)
CHLORIDE SERPL-SCNC: 100 MMOL/L — SIGNIFICANT CHANGE UP (ref 96–108)
CO2 SERPL-SCNC: 29 MMOL/L — SIGNIFICANT CHANGE UP (ref 22–31)
CREAT SERPL-MCNC: 0.45 MG/DL — LOW (ref 0.5–1.3)
EGFR: 92 ML/MIN/1.73M2 — SIGNIFICANT CHANGE UP
EOSINOPHIL # BLD AUTO: 0.08 K/UL — SIGNIFICANT CHANGE UP (ref 0–0.5)
EOSINOPHIL NFR BLD AUTO: 1.2 % — SIGNIFICANT CHANGE UP (ref 0–6)
GLUCOSE BLDC GLUCOMTR-MCNC: 115 MG/DL — HIGH (ref 70–99)
GLUCOSE BLDC GLUCOMTR-MCNC: 135 MG/DL — HIGH (ref 70–99)
GLUCOSE SERPL-MCNC: 131 MG/DL — HIGH (ref 70–99)
HCT VFR BLD CALC: 31.8 % — LOW (ref 34.5–45)
HGB BLD-MCNC: 9.9 G/DL — LOW (ref 11.5–15.5)
IMM GRANULOCYTES NFR BLD AUTO: 0.3 % — SIGNIFICANT CHANGE UP (ref 0–1.5)
LYMPHOCYTES # BLD AUTO: 1.57 K/UL — SIGNIFICANT CHANGE UP (ref 1–3.3)
LYMPHOCYTES # BLD AUTO: 23.3 % — SIGNIFICANT CHANGE UP (ref 13–44)
MAGNESIUM SERPL-MCNC: 1.7 MG/DL — SIGNIFICANT CHANGE UP (ref 1.6–2.6)
MCHC RBC-ENTMCNC: 26.6 PG — LOW (ref 27–34)
MCHC RBC-ENTMCNC: 31.1 GM/DL — LOW (ref 32–36)
MCV RBC AUTO: 85.5 FL — SIGNIFICANT CHANGE UP (ref 80–100)
MONOCYTES # BLD AUTO: 0.68 K/UL — SIGNIFICANT CHANGE UP (ref 0–0.9)
MONOCYTES NFR BLD AUTO: 10.1 % — SIGNIFICANT CHANGE UP (ref 2–14)
NEUTROPHILS # BLD AUTO: 4.32 K/UL — SIGNIFICANT CHANGE UP (ref 1.8–7.4)
NEUTROPHILS NFR BLD AUTO: 64.1 % — SIGNIFICANT CHANGE UP (ref 43–77)
NRBC # BLD: 0 /100 WBCS — SIGNIFICANT CHANGE UP (ref 0–0)
PHOSPHATE SERPL-MCNC: 2.5 MG/DL — SIGNIFICANT CHANGE UP (ref 2.5–4.5)
PLATELET # BLD AUTO: 306 K/UL — SIGNIFICANT CHANGE UP (ref 150–400)
POTASSIUM SERPL-MCNC: 3.8 MMOL/L — SIGNIFICANT CHANGE UP (ref 3.5–5.3)
POTASSIUM SERPL-SCNC: 3.8 MMOL/L — SIGNIFICANT CHANGE UP (ref 3.5–5.3)
RBC # BLD: 3.72 M/UL — LOW (ref 3.8–5.2)
RBC # FLD: 15.1 % — HIGH (ref 10.3–14.5)
SODIUM SERPL-SCNC: 137 MMOL/L — SIGNIFICANT CHANGE UP (ref 135–145)
WBC # BLD: 6.74 K/UL — SIGNIFICANT CHANGE UP (ref 3.8–10.5)
WBC # FLD AUTO: 6.74 K/UL — SIGNIFICANT CHANGE UP (ref 3.8–10.5)

## 2022-07-26 PROCEDURE — 99233 SBSQ HOSP IP/OBS HIGH 50: CPT | Mod: GC

## 2022-07-26 PROCEDURE — 99498 ADVNCD CARE PLAN ADDL 30 MIN: CPT | Mod: 25

## 2022-07-26 PROCEDURE — 99233 SBSQ HOSP IP/OBS HIGH 50: CPT

## 2022-07-26 PROCEDURE — 99497 ADVNCD CARE PLAN 30 MIN: CPT | Mod: 25

## 2022-07-26 RX ORDER — POTASSIUM PHOSPHATE, MONOBASIC POTASSIUM PHOSPHATE, DIBASIC 236; 224 MG/ML; MG/ML
30 INJECTION, SOLUTION INTRAVENOUS ONCE
Refills: 0 | Status: COMPLETED | OUTPATIENT
Start: 2022-07-26 | End: 2022-07-26

## 2022-07-26 RX ORDER — SODIUM CHLORIDE 9 MG/ML
1000 INJECTION, SOLUTION INTRAVENOUS
Refills: 0 | Status: COMPLETED | OUTPATIENT
Start: 2022-07-26 | End: 2022-07-27

## 2022-07-26 RX ORDER — ROBINUL 0.2 MG/ML
0.2 INJECTION INTRAMUSCULAR; INTRAVENOUS EVERY 6 HOURS
Refills: 0 | Status: DISCONTINUED | OUTPATIENT
Start: 2022-07-26 | End: 2022-07-28

## 2022-07-26 RX ORDER — HYDROMORPHONE HYDROCHLORIDE 2 MG/ML
0.25 INJECTION INTRAMUSCULAR; INTRAVENOUS; SUBCUTANEOUS EVERY 4 HOURS
Refills: 0 | Status: DISCONTINUED | OUTPATIENT
Start: 2022-07-26 | End: 2022-07-28

## 2022-07-26 RX ADMIN — ENOXAPARIN SODIUM 30 MILLIGRAM(S): 100 INJECTION SUBCUTANEOUS at 18:02

## 2022-07-26 RX ADMIN — SODIUM CHLORIDE 40 MILLILITER(S): 9 INJECTION, SOLUTION INTRAVENOUS at 20:09

## 2022-07-26 RX ADMIN — POTASSIUM PHOSPHATE, MONOBASIC POTASSIUM PHOSPHATE, DIBASIC 83.33 MILLIMOLE(S): 236; 224 INJECTION, SOLUTION INTRAVENOUS at 12:24

## 2022-07-26 NOTE — PROGRESS NOTE ADULT - ASSESSMENT
87yo F with PMH of CVA, HTN, HLD, bedbound/nonverbal at baseline p/w encephalopathy and found to have UTI and dysphagia. Palliative consulted for complex medical decision making in the setting of advanced illness.    ·	plan is for referral and transition to [facility] for best supportive care  ·	ordered Dilaudid 0.25mg IV q4h PRN for Severe Pain or RR >22  ·	ordered Robinul 0.2mg IV q6h PRN for Excessive Secretions

## 2022-07-26 NOTE — SWALLOW BEDSIDE ASSESSMENT ADULT - NS SPL SWALLOW CLINIC TRIAL FT
When bolus (1 ice chip followed by 1/2 tsp of water) was placed on tongue, Pt with no attempt at bolus formation or manipulation, mouth remained open. No pharyngeal swallow trigger was appreciated despite tactile stimulation. Pt with audible pharyngeal and visible posterior oral secretions which were suctioned along with thin liquid bolus residue. Pt is not a candidate for PO diet due to above and is at high aspiration risk. Pt would benefit from frequent deep oral suctioning for improved secretion management. Palliative care consult is warranted to delineate goals of care with regard to feeding plan. Pt is unsafe for PO given today's presentation.
Chocolate ice cream, applesauce, and ice chips applied to lips and tongue. With ice cream, pt demonstrated some bolus awareness AEB delayed and slow repetitive lingual movements. However, pt continued to present with a persistent open mouth posture with all trials despite tactile and thermal stimulation to oral structures. All residual PO, including ~1/4 tsp of water was eventually suctioned from anterior and lateral sulci. Additional trials deferred.
Ice-chip was manually removed and tsp water trial was suctioned from oral cavity.
PO trials were not presented d/t profound swallowing impairment characterized by absent mouth closure to stimulus and inability to complete a spontaneous swallow of secretions. Case d/w Dr. Covington who will discuss GOC and POC with family.

## 2022-07-26 NOTE — SWALLOW BEDSIDE ASSESSMENT ADULT - SWALLOW EVAL: DIAGNOSIS
Minimal to no change in clinical presentation compared to prior Minimal to no change in clinical presentation compared to prior assessment. Pt continues to present with profound oral phase deficits with persistent open mouth posture, minimal to no bolus processing, and absent A-P transit into the pharynx. Based on the above, recommend pt continue to remain NPO pending final decision re: GOC and long-term feeding.

## 2022-07-26 NOTE — CONSULT NOTE ADULT - SUBJECTIVE AND OBJECTIVE BOX
Consult requested by: Melissa Sequeira MD		Role: Resident      			Service: Medicine        Attending: Jm Toussaint MD  Consultant: Marilynn Nunez MS, [facility] (Medical Ethicist)                             Contact #s: [phone] (c)  [phone] (o)  Consult purpose:  To assist the healthcare team in an ethical dilemma of an 88-year-old female with advanced dementia admitted for altered mental status and decreased PO intake, regarding goals of care.    Clinical summary: This is an 88-year-old female with a past medical history of hypertension, hyperlipidemia, anemia (baseline of 9.8 in 2021), major depression (on Bupropion 150mg), diabetes mellitus, cerebral vascular accidents (nonverbal with cognitive impairment) and bedbound at baseline, and advanced dementia. Patient was brought in by home health aide after being found altered and with decreased PO intake. On arrival, patient found to have urinary tract infection (UTI) and new O2 requirements secondary to hypoxic respiratory failure due to pneumonia. On 7/18, patient evaluated by Speech Language Pathology (SLP). Per SLP, patient with severe libby-pharyngeal dysphagia and is not a candidate for PO diet as patient is a high aspiration risk. Patient re-evaluated by SLP on 7/20 due to more alert mental status. Per SLP, patient continued to show absent swallow trigger. Neurology consulted on 7/20 for altered mental status. 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 Per Neuro, a temporary NG tube is indicated to nourish patient while getting treatment for acute conditions to see if patient improves, as it is likely that patient will start to initiate swallowing again with treatment of underlying medical conditions. Neurology also noted that would hold off on considering PEG tube until it is seen that patient’s mental status is not improving. On 7/21, Neurology noted that patient’s dysphagia is likely not reversible due to advanced dementia. Per Medicine note on 7/22, PEG would not be recommended as it would not reverse patient’s vascular dementia. On 7/22, temporary NGT placed. Neurology noted on 7/22 that given patient’s end stage dementia, PEG is recommended against. Patient re-evaluated by SLP on 7/25. Per SLP, patient continues to have profound swallowing impairment characterized by absent mouth closure to stimulus and inability to complete a spontaneous swallow of secretions. Palliative on consult. Ethics consult initiated to facilitate optimization of interdisciplinary communication and to clarify goals of care, given the patient’s current state and her guarded prognosis.      Prognosis Estimate (survival in days, wks, mos, yrs): Guarded   Patient Decision-Making Capacity:   Has capacity 	  Lacks capacity (patient has advanced dementia)		  Patient Aware of:  Diagnosis:   Yes    No   Unknown    Prognosis:   Yes    No   Unknown       Name of medical decision-maker should patient lack capacity: Talita Haney	  Relationship: Sister  Role:   Health Care Proxy      Legal Surrogate   Contact #(s): [phone]  Evidence of Patient’s Preference of Life-Sustaining Treatment (Written or Oral): Yes – see Palliative notes  Resuscitation status:  DNR:  Yes   No      DNI:  Yes   No    Discussions:   Discussion with Melissa Sequeira MD (Resident) on 7/21/22 (12:00 – 12:15): Reviewed patient’s medical record and guarded prognosis. Discussed conversations had with patient’s HCP (sister). Sister stated that she would not want patient to get PEG, but nutritional goals have still not been confirmed. Ethics informed that from an Ethics viewpoint, PEG would not be recommended given patient’s advanced dementia (contraindicated). Medicine team to touch base with Neurology, as HCP requesting Neuro involvement for prognostication and long-term feeding recommendations. Ongoing goals of care conversations to continue with patient’s HCP regarding nutrition.  Discussion with Brenda Prince NP (Palliative) on 7/22/22 (9:30 – 9:40): Discussed patient’s case and conversations had with patient’s HCP. Per Palliative, patient’s mentation has mildly improved since admission with treatment of UTI. However, patient remains unable to recognize PO in oral cavity or initiate swallow. Without adequate nutrition/improvement in PO intake, patient likely in the last weeks of her life. Patient is hospice appropriate and PEG would not prolong life. Patient’s HCP stated that she would not elect to have PEG placed given risks and complications, but she doesn’t feel qualified to make this decision.  Discussion with Marco Mg MD (Palliative) on 7/25/22 (10:15 – 10:25): Discussed updates in patient’s case. Patient had NGT placed on Friday. Mental status has not improved much with the NGT. Interdisciplinary goals of care conversation planned for tomorrow, 7/26, with patient’s family to discuss nutritional goals.     Bioethics analysis:  The central ethical issue that exists in this case is (A) the providers’ desire for beneficence and non-maleficence. The medical practitioner is tasked by professional, ethical and legal duties to make clinical decisions on a daily basis. This clinical decision making involves the judicious use of evidence, taking into account both clinical expertise and the needs and wishes of individual patients.1 This specialized knowledge is beyond the scope of most patients and their families, so they must rely on the medical practitioner to present them with the information they lack and to clinically guide them through the patient’s illness. These are part of the nondelegable duty of the medical practitioner: (1) to make clinical decisions based on their professional knowledge and expertise and (2) to present information to patients and their families so they can fully participate in the decisions that are in the patient’s power to make.    While medical practitioners should strive for shared decision-making between patients and their families and the medical team, there are clinical decisions that are nondelegable and solely under medical practitioners’ purview. Therefore, those clinical decisions do not need the patient’s input to happen. It is helpful to understand the patient’s wishes and goals, but the clinical expertise takes precedence and determines what is the clinically appropriate intervention that is "indicated" or not for each patient and its illness. Practitioners are not obligated to perform procedures that are not in accordance with acceptable practice and where harm exceeds benefits. As such, it is a protection of the patient’s autonomy–by carefully unfolding the medical practitioner’s professional knowledge and expertise to make clinical decisions that adhere to principles of beneficence and nonmaleficence and are indicated for this patient and their illness. While patients and their family decide quality of life issues, autonomy does not equate to "patient control of decision-making." It is a careful balance between the practitioners’ duty to their patient and the appropriate input from patients and their families, to deliver patient-centered care.    Feeding tubes are medical interventions, and thus under the medical practitioners’ purview to decide, through their professional knowledge and expertise, if they are clinically indicated and appropriate for their patient. Feeding tubes are sometimes confounded with nutrition. However, they are one of many means of achieving nutrition for patients, but they are not the nutrition per se. In that regard, they can be started or discontinued when they no longer serve an appropriate clinical indication (when they introduce risk and suffering for the patient). They can also be discontinued when a patient demonstrates a refusal to partake in the medical intervention (such as continuously pulling their feeding tube out of their bodies). When a patient is at their end of life and are demonstrating a refusal to feeding tubes, the next clinically appropriate step is to shift to comfort feeding.    Current literature supports maintenance of oral feeding rather than enteral nutrition (via feeding tubes –NG or PEG). Enteral feeding given to prevent anorexia, weight loss and improve mortality is not supported by recent evidenced based research. The literature suggests that there are four medical indications for PEG tube placement: esophageal obstruction, dysphagia from a neurologic disease, refusal to swallow without evidence of a terminal state, and supplemental nutrition for those receiving chemotherapy and radiation therapy.2 On the other hand, NGTs are short term interventions to deliver nutrition as a bridge to a PEG or regular feeding.3 In this case, the patient has advanced dementia accompanied by other complex medical conditions. If no physiologic benefit is expected from a feeding tube placement, the medical practitioner has no obligation to offer or perform an intervention that is not clinically indicated. This same principle would apply if an intervention improves the physiologic state but has no effect on the patient’s quality of life.    Recent literature further suggests that "patients with dementia in very advanced stages should no longer be treated with artificial nutrition of any kind,"3 and it may increase mortality.4 Moreover, feeding tube placement may lead to infection, fistulas, and clogged or dislodged tubes. Having a feeding tube does not reduce the risk of aspiration, does not promote healing of decubitus ulcers, has not been proven to increase the quality of life nor has been shown to increase life expectancy.5    In addition, a particular concern regarding this ethical dilemma is determining the balance between nonmaleficence and beneficence. A feeding tube can cause eroded nares, pharyngeal discomfort, diarrhea (which can further worsen decubitus ulcers), aspiration pneumonia, rhinitis, sinusitis and other nosocomial infections.10-11 In other words, any feeding tube placement, under a proportionality standard, would bring a harm that exceeds any potential benefit for this patient.     After careful consideration of the medicine in this case, the next clinically appropriate step is to shift to pleasure feeds with hospice. It is paramount to emphasize that patients with severe dementia may not want to eat or drink as they approach end-of-life. This state is a normal part of the dying process.5 Such a shift is the ethical path (to honor and preserve a patient’s sense of dignity and personhood, to promote the best possible health or quality of living or dying with aggressive interventions when these help prolong life with "good quality", with comfort care when cure and remission are not possible and the aim is to achieve the best "quality of dying").7 While comfort feeding might bring its share of risks, it remains as the most appropriate step for this patient at end-of-life with advanced dementia.    In this case, a feeding tube is not ethically indicated, and the patient has been deemed not a candidate by the Medicine team; thus, a PEG should not be offered.     In accordance with the Palliative Care Access Act, the attending medical practitioner is required to provide patients with a terminal illness not only prognosis, risks and benefits of treatment options but also patient’s legal rights to symptom management at the end of life.8 Fundamental to a palliative approach is the aim to reduce suffering and improve the quality of life for dying patients.

## 2022-07-26 NOTE — PROGRESS NOTE ADULT - PROBLEM SELECTOR PLAN 1
Pt with decreased PO intake per HHA for few days leading up to admission. Speech and swallow evaluation x3 over duration of pt stay, pt continues to not able to initiate swallow. Dobhoff tube in placed over the weekend, now removed. Pt on maintenance fluids. Continuing goals of care discussion with sister Talita Haney (HCP).  - continue goals of care discussions, palliative on board  - plan for family meeting at 3:30pm 7/25    - f/u speech recs   - f/u nutrition recs  - dobhoff tube placement 7/22-7/25  - maintenance fluids D5 in LR at 40cc/hr  - NPO, aspiration precautions  - POCT q6

## 2022-07-26 NOTE — PROGRESS NOTE ADULT - PROBLEM SELECTOR PLAN 1
.  -ordered Robinul 0.2mg IV q6h PRN for Excessive Secretions  -ordered Dilaudid 0.25mg IV q4h PRN for Severe Pain or RR >22

## 2022-07-26 NOTE — PROGRESS NOTE ADULT - PROBLEM SELECTOR PLAN 3
Pt with two episodes of diarrhea Pt with two episodes of new onset diarrhea yesterday night 7/25. Pt with two episodes of new onset diarrhea yesterday night 7/25, no hematochezia/melena. Likely 2/2 tube feeds in addition to pt receiving miralax (new medication). Pt afebrile without leukocytosis, low c/f C diff, though can consider if diarrhea persists, ramin in context of recent abx for UTI tx.  - monitor BMs  - monitor electrolytes (pt NPO)

## 2022-07-26 NOTE — SWALLOW BEDSIDE ASSESSMENT ADULT - COMMENTS
Multiple prior assessments this admission. Most recently evaluated yesterday, 7/25. PO trials deferred d/t profound swallowing impairment with absent mouth closure and inability to complete a spontaneous swallow of secretions. Pt has been NPO pending GOC discussion with sister.
Pt known to this svc from prior swallow evals & f/u. Case d/w resident, NGT placed over wknd to provide nutrition w plan to re-assess swallow fnx today.
Pt with eyes and mouth open, non verbal, does not follow commands, does not make eye contact, unable to close her mouth with tactile prompt. HHA at the bedside

## 2022-07-26 NOTE — CHART NOTE - NSCHARTNOTEFT_GEN_A_CORE
Admitting Diagnosis:   Patient is a 88y old  Female who presents with a chief complaint of AMS (25 Jul 2022 15:43)      PAST MEDICAL & SURGICAL HISTORY:  Diabetes      Cerebrovascular accident (CVA)      HLD (hyperlipidemia)      HTN (hypertension)      DM (diabetes mellitus)      HLD (hyperlipidemia)      CVA (cerebral vascular accident)      No significant past surgical history          Current Nutrition Order:NPO repeat SLP swallowing evaluation failed 7/25       PO Intake: Good (%) [   ]  Fair (50-75%) [   ] Poor (<25%) [   ]NPO    GI Issues: fecal incontinence 7/26    Pain:not reported    Skin Integrity:No PU    Labs:   07-26    137  |  100  |  11  ----------------------------<  131<H>  3.8   |  29  |  0.45<L>    Ca    9.8      26 Jul 2022 08:31  Phos  2.5     07-26  Mg     1.7     07-26      CAPILLARY BLOOD GLUCOSE      POCT Blood Glucose.: 115 mg/dL (25 Jul 2022 19:26)  POCT Blood Glucose.: 176 mg/dL (25 Jul 2022 11:58)      Medications:  MEDICATIONS  (STANDING):  dextrose 5% + lactated ringers. 1000 milliLiter(s) (40 mL/Hr) IV Continuous <Continuous>  enoxaparin Injectable 30 milliGRAM(s) SubCutaneous every 24 hours    MEDICATIONS  (PRN):      Weight:51.2kg  Daily     Daily no updated weights.    Weight Change: no updated    Estimated energy needs: Weight Change: UBW:144lbs, 31lb/21.5% weight loss,IBW:120lbs    Estimated energy needs: Based on admitted weight due to between % of IBW. ABW: 113 lb 51.2 kg q97-91=9649-0602 and protein needs: 51.2 kgx1.2-1.4=61.44-71.68gm fluids per team.    Subjective: 89 yo with ho of HTN, HLD, CVA, non verbal / cognitive impairment, bedbound, lives at home with help of home health aide brought in after being found altered found to have UTI. Oropharyngeal dysphagia. Seen by SLP with recommendations for·frequent moist oral swabs for pleasure/comfort and establish long-term feeding goals. .Per family does not agree to comfort feeds at this time. S/P NGT and initiate feeds over weekend  now NG out .Repeat swallow evaluation 7/25 by SLP with ongoing failure to have ability to eat. PO trials were not presented d/t profound swallowing impairment characterized by absent mouth closure to stimulus and inability to complete a spontaneous swallow of secretions.GOC to be discussed with family today.  This am, ongoing NPO with plans for GOC discussion with family today. Goal to make comfort feeds /2/2 inability to take po and not a candidate for nutritional support   Previous Nutrition Diagnosis: Severe PCM RT inability to meet >75% EER AEB pt with sudden decrease in oral intake PTA, NFPE    Active [  x ]  Resolved [   ]Not relevant     If resolved, new PES:     Goal: Align nutrition with goals of care    Recommendations:1.follow GOC plans for nutrition at all times    Education: not indicated    Risk Level: High [  x ] Moderate [   ] Low [   ]
Admitting Diagnosis:   Patient is a 88y old  Female who presents with a chief complaint of AMS (22 Jul 2022 06:13)      PAST MEDICAL & SURGICAL HISTORY:  Diabetes      Cerebrovascular accident (CVA)      HLD (hyperlipidemia)      HTN (hypertension)      DM (diabetes mellitus)      HLD (hyperlipidemia)      CVA (cerebral vascular accident)      No significant past surgical history          Current Nutrition Order:NPO. Plans for NGT placement today        PO Intake: Good (%) [   ]  Fair (50-75%) [   ] Poor (<25%) [   ]NPO with inability to take po per SLP    GI Issues: 7/17 fecal incontinence    Pain: not reported    Skin Integrity:intact    Labs:   07-22    136  |  99  |  6<L>  ----------------------------<  126<H>  3.0<L>   |  27  |  0.63    Ca    9.1      22 Jul 2022 10:00  Phos  3.2     07-22  Mg     1.9     07-22      CAPILLARY BLOOD GLUCOSE      POCT Blood Glucose.: 163 mg/dL (21 Jul 2022 18:00)  POCT Blood Glucose.: 123 mg/dL (21 Jul 2022 12:46)      Medications:  MEDICATIONS  (STANDING):  dextrose 5% + lactated ringers. 1000 milliLiter(s) (40 mL/Hr) IV Continuous <Continuous>  enoxaparin Injectable 30 milliGRAM(s) SubCutaneous every 24 hours  piperacillin/tazobactam IVPB.. 3.375 Gram(s) IV Intermittent every 6 hours    MEDICATIONS  (PRN):      Weight:51.3kg  Daily     Daily no updated weights    Weight Change: UBW:144lbs, 31lb/21.5% weight loss,IBW:120lbs    Estimated energy needs: Based on admitted weight due to between % of IBW. ABW: 113 lb 51.2 kg t77-89=9198-4972 and protein needs: 51.2 kgx1.2-1.4=61.44-71.68gm fluids per team.    Subjective: 89 yo with ho of HTN, HLD, CVA, non verbal / cognitive impairment, bedbound, lives at home with help of home health aide brought in after being found altered found to have UTI. Oropharyngeal dysphagia. Seen by SLP with recommendations for·frequent moist oral swabs for pleasure/comfort and establish long-term feeding goals. .Per family does not agree to comfort feeds at this time. Per IDR's plan for placement of temporary short term NGT and initiate feeds with plans to d/c on Monday to have repeat swallow evaluation to access po ability in order to advance diet for d/c to [place].        Previous Nutrition Diagnosis : Malnutrition.. Severe PCM RT inability to meet >75% EER  AEB pt with sudden decrease in oral intake PTA, NFPE    Active [ x  ]  Resolved [   ]not relevant patient is NPO    If resolved, new PES:     Goal: Align nutrition with goals of care at all times    Recommendations:  1. Continue with NPO diet order. Should GOC align with enteral nutrition, recommend:  >>initiate Jevity 1.2@ 20mL/hr, increasing by 10 q8-10hrs until goal rate of 53mL is reached. Goal rate provides 1272mL TV, 1526kcal, 71g pro, 1027mL free water. Additional fluids per team. Goal rate will not be achieved over 2 days .For weekend recommend to keep feeds at 30cc/hr only .Pending SLP re-evaluation for diet advancement.  >>Monitor s/sx aspiration and tolerance   >>Maintain aspiration precautions at all time  2. Align nutrition interventions with GOC at all times   3. Diet advancement per SLP  4. Pain/BM regimen per team  5. Aggressively monitor lytes replete prn.   >>Note pt at risk for refeeding syndrome in the setting of severe malnutrition.  6. Recommend daily MVI   7. RD will follow up per protocol.        Risk Level: High [  x ] Moderate [   ] Low [   ]

## 2022-07-26 NOTE — PROGRESS NOTE ADULT - ASSESSMENT
87 yo with ho of HTN, HLD, multiple CVAs (bed bound w/ 24/7 HHA, non verbal, cognitive impairment) brought in after being found altered and decreased PO intake, found to have UTI s/p treatment and new O2 requirements (now resolved), now being assessed for dysphagia with NG tube and ongoing goals of care discussions.

## 2022-07-26 NOTE — SWALLOW BEDSIDE ASSESSMENT ADULT - MUCOSAL QUALITY
dry. Oral care was provided w suctioning to remove dried mucus from posterior oral cavity.
Extremely dry oral mucosa
Dried secretions on gingiva, buccal cavity, and tongue surface d/t open-mouth posture. These were cleared by SLP via moist swabs. Clear, pooled secretions at base of tongue d/t essentially absent pharyngeal swallow, cleared via oropharyngeal suction abdoul Cohen.
Open mouth posture at baseline revealed dry mucus membranes. Oral care was provided via swab

## 2022-07-26 NOTE — SWALLOW BEDSIDE ASSESSMENT ADULT - SWALLOW EVAL: RECOMMENDED DIET
NPO. Further feeding recommendations should be based on overall goals of care in this elderly Pt with multiple medical problems
NPO pending GOC discussion
Oral hydration via moist swabs for comfort. Provide oral suction to clear retained secretions PRN.

## 2022-07-26 NOTE — CONSULT NOTE ADULT - ASSESSMENT
Recommendation: Consistent with the ethical principle of beneficence, the clinical team must critically assess the efficacy of the therapies on a case-by-case basis to ascertain if an intervention will cause more harm than benefit. The clinical team has a duty to appropriately offer measures that promote comfort. In this case, a PEG tube may be considered non-beneficial by the health providers in light of this patient’s advanced dementia. Particularly critical is ensuring palliation and comfort care measures for this patient as providing care is "everything that is medically possible at present."   Ongoing conversations with patient’s HCP to discuss goals of care regarding nutrition for patient. Interdisciplinary meeting planned for 7/26/2022.  Thank you for this challenging case. Please do not hesitate to call us if there are any questions.     Ethics Service will remain available for further conversation about the patient’s current condition and decision points that may occur.     	  					  DISCUSSED WITH MEDICAL ETHICS ATTENDINGS: JOHNNIE FREDERICK MD & CARLOZ THOMAS DNP (DIRECTOR OF MEDICAL ETHICS)   More than 50% of the time of this consultation was spent in coordination of Care of Patient					      References: 		    1Bmichelle L et al. How clinical decisions are made. Br J Clin Pharmacol 2012;74(4):614-620.  2Achata F, Magy R. The percutaneous endoscopic gastrostomy tube: medical and ethical issues in placement. The American journal of gastroenterology. 2003 Feb 1;98(2):272-7.  3Dlan NUNEZ & Sherine OROZCO. Percutaneous endoscopic gastrostomy - Too often? Too late? Who are the right patients for gastrostomy? World J Gastroenterol 2020;26(20):1284-2878.  4jenniffer Bean-Kimberly RL et al. Percutaneous endoscopic gastrostomy in older patients with and without dementia: Survival and ethical considerations. J Gastroenterol Hepatol 2019;34(4):736-741.  5Ajeb AR et al. PEG Insertion in Patients With Dementia Does Not Improve Nutritional Status and Has Worse Outcomes as Compared With PEG Insertion for Other Indications. J Clin Gastroenterol 2017;51(5):417-420.  6Oluwasolape Leighannaopo E & Oluwassonia Arnoldpo R. Tube Feeding in Individuals with Advanced Dementia: A Review of Its Burdens and Perceived Benefits. J Aging Res 2019;Article ID [id number].  7Beliseo TL & Carolina JF. Principles of Biomedical Ethics. [place]: [employer]; 2019.  8Candace RODRIGUEZ. Comfort Feeding Only: A Proposal to Bring Clarity to Decision-Making Regarding Difficulty with Eating for Persons with Advanced Dementia. J Am Geriatr Soc 2010;58(3):580-584.  9PHL § 2997-d et seq. (2020).  10Raegan RIVERA et al. Enteral Nutrition in Yun GILES. & Ana FREEDMAN (eds) Geriatric Gastroenterology. [facility]: [place]; 2020.  11Daysi S et al. Nasoenteric tube complications. Scandinavian J Surg 2012;101:147-155.

## 2022-07-26 NOTE — PROGRESS NOTE ADULT - SUBJECTIVE AND OBJECTIVE BOX
[facility] Geriatrics and Palliative Care  Tom Ronquillo, Palliative Care Attending  Contact Info: Call [phone] (HEAL Line) or message on Microsoft Teams (Tom Ronquillo)    SUBJECTIVE AND OBJECTIVE:  INTERVAL HPI/OVERNIGHT EVENTS: Interval events noted. Patient seen at bedside, lethargic and nonverbal. Unable to participate in interview. Aide at bedside did not endorse any complaints from patient. See patient's PRN use for the past 24hrs noted below. Extensive time spent discussing plan of care with family.    ALLERGIES:  morphine (Other)    MEDICATIONS  (STANDING):  dextrose 5% + lactated ringers. 1000 milliLiter(s) (40 mL/Hr) IV Continuous <Continuous>  enoxaparin Injectable 30 milliGRAM(s) SubCutaneous every 24 hours    Analgesic Use (Scheduled and PRNs) for past 24 hours:    ITEMS UNCHECKED ARE NOT PRESENT  PRESENT SYMPTOMS/REVIEW OF SYSTEMS: [x]Unable to obtain due to poor mentation   Source if other than patient:  []Family   []Team     Pain: [] yes [x] no  QOL impact -  Location -  Aggravating factors -  Quality -  Radiation -  Timing -  Severity (0-10 scale) -  Minimal acceptable level (0-10 scale) -    PAINAD Score: 0    Dyspnea:                           []Mild  []Moderate []Severe  Anxiety:                             []Mild []Moderate []Severe  Fatigue:                             []Mild []Moderate []Severe  Nausea:                             []Mild []Moderate []Severe  Loss of appetite:              []Mild []Moderate []Severe  Constipation:                    []Mild []Moderate []Severe    Other Symptoms:  [x]All other review of systems negative     Palliative Performance Status Version 2: 20%    Vital Signs Last 24 Hrs  T(C): 36.4 (26 Jul 2022 12:25), Max: 36.6 (26 Jul 2022 06:15)  T(F): 97.5 (26 Jul 2022 12:25), Max: 97.8 (26 Jul 2022 06:15)  HR: 74 (26 Jul 2022 12:25) (74 - 86)  BP: 155/73 (26 Jul 2022 12:25) (143/70 - 155/73)  BP(mean): --  RR: 18 (26 Jul 2022 12:25) (18 - 19)  SpO2: 100% (26 Jul 2022 12:25) (98% - 100%)    Parameters below as of 26 Jul 2022 12:25  Patient On (Oxygen Delivery Method): room air    LABS:                         9.9    6.74  )-----------( 306      ( 26 Jul 2022 08:31 )             31.8   07-26    137  |  100  |  11  ----------------------------<  131<H>  3.8   |  29  |  0.45<L>    Ca    9.8      26 Jul 2022 08:31  Phos  2.5     07-26  Mg     1.7     07-26    RADIOLOGY & ADDITIONAL STUDIES: None new    DISCUSSION OF CASE: Sister, Friend,  - to provide updates and emotional support [facility] Geriatrics and Palliative Care  Tom Ronquillo, Palliative Care Attending  Contact Info: Call [phone] (HEAL Line) or message on Microsoft Teams (Tom Ronquillo)    SUBJECTIVE AND OBJECTIVE:  INTERVAL HPI/OVERNIGHT EVENTS: Interval events noted. Patient seen at bedside, lethargic and nonverbal. Unable to participate in interview. Aide at bedside did not endorse any complaints from patient. See patient's PRN use for the past 24hrs noted below. Extensive time spent discussing plan of care with family.    ALLERGIES:  morphine (Other)    MEDICATIONS  (STANDING):  dextrose 5% + lactated ringers. 1000 milliLiter(s) (40 mL/Hr) IV Continuous <Continuous>  enoxaparin Injectable 30 milliGRAM(s) SubCutaneous every 24 hours    Analgesic Use (Scheduled and PRNs) for past 24 hours:    ITEMS UNCHECKED ARE NOT PRESENT  PRESENT SYMPTOMS/REVIEW OF SYSTEMS: [x]Unable to obtain due to poor mentation   Source if other than patient:  []Family   []Team     Pain: [] yes [x] no  QOL impact -  Location -  Aggravating factors -  Quality -  Radiation -  Timing -  Severity (0-10 scale) -  Minimal acceptable level (0-10 scale) -    PAINAD Score: 0    Dyspnea:                           []Mild  []Moderate []Severe  Anxiety:                             []Mild []Moderate []Severe  Fatigue:                             []Mild []Moderate []Severe  Nausea:                             []Mild []Moderate []Severe  Loss of appetite:              []Mild []Moderate []Severe  Constipation:                    []Mild []Moderate []Severe    Other Symptoms:  [x]All other review of systems negative     Palliative Performance Status Version 2: 20%    GENERAL:  []Alert  []Oriented x   [x]Lethargic  []Cachexia  []Unarousable  []Verbal  [x]Non-Verbal  Behavioral:   []Anxiety  [x]Delirium []Agitation []Cooperative  HEENT:  [x]Normal   []Dry mouth   []ET Tube/Trach  []Oral lesions  PULMONARY:   []Clear []Tachypnea  [x]Audible excessive secretions  [x]Normal Work of Breathing []Labored Breathing  []Rhonchi []Crackles []Wheezing  CARDIOVASCULAR:    [x]Regular []Irregular []Tachy  []Brayan  GASTROINTESTINAL:  [x]Soft  []Distended   [x]+BS  [x]Non tender []Tender  []PEG []OGT/ NGT  Last BM:  GENITOURINARY:  []Normal [x] Incontinent   []Oliguria/Anuria   []Puente  MUSCULOSKELETAL:   []Normal   []Weakness  [x]Bed/Wheelchair bound []Edema  NEUROLOGIC:   []No focal deficits  [x]Cognitive impairment  []Dysphagia []Dysarthria []Paresis [x]Encephalopathic  SKIN:   [x]Normal   []Pressure ulcer(s)  []Rash    Vital Signs Last 24 Hrs  T(C): 36.4 (26 Jul 2022 12:25), Max: 36.6 (26 Jul 2022 06:15)  T(F): 97.5 (26 Jul 2022 12:25), Max: 97.8 (26 Jul 2022 06:15)  HR: 74 (26 Jul 2022 12:25) (74 - 86)  BP: 155/73 (26 Jul 2022 12:25) (143/70 - 155/73)  BP(mean): --  RR: 18 (26 Jul 2022 12:25) (18 - 19)  SpO2: 100% (26 Jul 2022 12:25) (98% - 100%)    Parameters below as of 26 Jul 2022 12:25  Patient On (Oxygen Delivery Method): room air    LABS:                         9.9    6.74  )-----------( 306      ( 26 Jul 2022 08:31 )             31.8   07-26    137  |  100  |  11  ----------------------------<  131<H>  3.8   |  29  |  0.45<L>    Ca    9.8      26 Jul 2022 08:31  Phos  2.5     07-26  Mg     1.7     07-26    RADIOLOGY & ADDITIONAL STUDIES: None new    DISCUSSION OF CASE: Sister, Friend,  - to provide updates and emotional support

## 2022-07-26 NOTE — PROGRESS NOTE ADULT - CONVERSATION DETAILS
Pts chart reviewed. Pt unable to participate in ACP, reached out to pts sister for ongoing discussions of ACP and GOC.     Reviewed medical updates: pts mentation has mildly improved since admission with tx of UTI. She is pending fu SS evaluation for her severe oropharyndeal dysphagia.     Providers on the call explained that patient remains unable to recognize PO in oral cavity or initiate swallow and expressed concern that pt may not be cleared for diet advancement.  Pts sister maintains that, for now, she would not elect to have PEG placed given risks and complications.  Emphasized to pts sister that the option to allow food for comfort, w strict asp precautions, if pt awake and willing is available to her at anytime.     Sister appears to understand that without adequate nutrition/improvement in PO intake, pt likely in the last weeks of her life.  However, further discussions of disease trajectory and introduction of home hospice were limited; Talita tangential throughout conversation and perseverated on neuro consult to evaluate pt for prognostication and long term feeding recommendations.     Primary team to update Talita following SS (and neuro) evaluations.     All questions answered. Support provided.
Reviewed patient's hospital course and interval developments. Discussed that patient continues to remain a total aspiration risk despite intensive neurologic work-up and treatment of acute reversible issues. Explained that NGT is a temporary measure and despite the addition of time-limited trial of nutrition, the patient has not had an improvement in her swallowing and continues to aspiration on her secretions/saliva.    Discussed prognosis and explained that patient's condition is likely to lead to her death in the short term. Life expectancy is weeks-months at best but likely shorter as she will inevitably aspirate in a clinically significant manner. Recommended best supportive care and family was understanding after thorough discussion. Introduced the role of home hospice but they were concerned about the patient being monitoring in case of symptom development. Alternatively discussed transfer to [facility] and they were amenable to this plan.    No aggressive interventions are desired but family hopes for best supportive care.
Pts chart reviewed. Reached out to pts sister and friend, Madison, by phone. Discussed medical developments: pt's mentation continues to improve albeit slowly with tx of UTI. We reviewed plan to place temporary NGT for the weekend and re-evaluate w SLP on Monday. Both maintain that long term PEG would likely not be within GOC. Talita and Madison w appropriate understanding that without adequate improvement in PO intake in the days/weeks ahead and without PEG placement, pt would be approaching the end of her life. Introduced the role and philosophy of home hospice as an added layer of support to pts 24/7 HHA. While discharge from [facility] with home hospice would not be within GOC, they understand the importance of ongoing discussion in the weeks/months ahead. Talita anxious and distressed about safety and comfort of her sister, extensive support provided, questions answered.

## 2022-07-26 NOTE — PROGRESS NOTE ADULT - PROBLEM SELECTOR PLAN 5
.  Patient is DNR/DNI, MOLST in chart  -sister (Talita) is designated HCP  -strong Holiness igor tradition  -family is amenable to [facility] referral for continued supportive care and management at end of life    In addition to the E/M visit, an advance care planning meeting was performed. Start time: 3:30PM; End time: 3:50PM; Total time: 20min. A face to face meeting to discuss advance care planning was held today regarding: ADRIANNE OMALLEY. Primary decision maker: Patient is unable to participate in decision making; Alternate/surrogate: Talita Haney. Discussed advance directives including, but not limited to, healthcare proxy and code status. Decision regarding code status: DNR/DNI; Documentation completed today: Hospice Referral [facility] note .  Patient is DNR/DNI, MOLST in chart  -sister (Talita) is designated HCP  -strong Alevism igor tradition  -family is amenable to [facility] referral for continued supportive care and management at end of life    In addition to the E/M visit, an advance care planning meeting was performed. Start time: 3:30PM; End time: 4:20PM; Total time: 50min. A face to face meeting to discuss advance care planning was held today regarding: ADRIANNE OMALLEY. Primary decision maker: Patient is unable to participate in decision making; Alternate/surrogate: Talita Haney. Discussed advance directives including, but not limited to, healthcare proxy and code status. Decision regarding code status: DNR/DNI; Documentation completed today: Hospice Referral [facility] note

## 2022-07-26 NOTE — PROGRESS NOTE ADULT - PROBLEM SELECTOR PLAN 2
Pt s/p CVA, baseline non verbal w/ cognitive impairment and bedbound. Per HHA had been more altered/lethargic from baseline for few days with decreased PO intake likely 2/2 UTI (UA with pyuria, bacteriuria). UCx growing Pseudomonas s/p zosyn treatment 3.375g x5days.    Pt mental status improving through admission, per HHA appears to be closer to home baseline (A&Ox0, bedbound) except for dysphagia. Pt s/p CVA, baseline non verbal w/ cognitive impairment and bedbound. Per HHA had been more altered/lethargic from baseline for few days with decreased PO intake likely 2/2 UTI (UA with pyuria, bacteriuria). UCx growing Pseudomonas s/p zosyn treatment 3.375g x5days.    Pt mental status improved through admission, per HHA appears to be closer to home baseline (A&Ox0, bedbound) except for dysphagia. Pt less responsive this morning on exam.

## 2022-07-26 NOTE — PROGRESS NOTE ADULT - PROBLEM SELECTOR PLAN 4
.  In the setting of frailty, previous CVA, and Vascular Dementia  -patient is FAST 7C, bedbound/nonverbal and now continuously aspirating  -hospice eligible and appropriate given bedbound status, continuous aspiration  -life expectancy appears to be weeks-months at best but more likely days-weeks since she will inevitably aspirate in a clinically significant manner .  In the setting of frailty, previous CVA, and Vascular Dementia  -patient is FAST 7E, bedbound/nonverbal and now continuously aspirating  -hospice eligible and appropriate given bedbound status, continuous aspiration  -life expectancy appears to be weeks-months at best but more likely days-weeks since she will inevitably aspirate in a clinically significant manner

## 2022-07-26 NOTE — CONSULT NOTE ADULT - CONSULT REASON
To assist the healthcare team in an ethical dilemma of an 88-year-old female with advanced dementia admitted for altered mental status and decreased PO intake, regarding goals of care.

## 2022-07-26 NOTE — PROGRESS NOTE ADULT - PROBLEM SELECTOR PLAN 6
.  Complex medical decision making / symptom management in the setting of advanced illness.    Will continue to follow for ongoing titration of palliative regimen. Emotional support provided, questions answered.  Active Psychosocial Referrals:  [x]Social Work/Case management []PT/OT []Chaplaincy [x]Hospice  []Patient/Family Support []Holistic RN []Massage Therapy [x]Ethics  Coping: [] well [] with difficulty [] poor coping [x] unable to assess  Support system: [x] strong [] adequate [] inadequate    For new or uncontrolled symptoms, please call Palliative Care at [phone]. The service is available 24/7 (including nights & weekends) to provide symptom management recommendations over the phone as appropriate

## 2022-07-26 NOTE — PROGRESS NOTE ADULT - PROBLEM SELECTOR PLAN 4
*RESOLVED  UA with pyuria, bacteruria, LE, nitrites. Unable to assess if patient symptomatic however more altered/lethargic from baseline on admission. No leukocytosis, negative lactate, not meeting SIRS criteria.   - s/p zosyn 3.375g q6 IV for Pseudomonal coverage x 5 days (7/18-7/22)    - UCx: Pseudomonas  - BCx: gram + cocci in clusters, PCR coag neg  - BCx repeat: no growth  - Ustrep neg, U legionella pending

## 2022-07-26 NOTE — SWALLOW BEDSIDE ASSESSMENT ADULT - SLP PERTINENT HISTORY OF CURRENT PROBLEM
CVA, non verbal, bedbound, poor PO intake, cough, congestion
PMHx significant for multiple prior CVAs (bed bound with 24/7 HHA, non-verbal, cognitive impairment), brought in after being found altered with decreased PO intake, found to have UTI s/p tx.
89 yo W w h/o multiple CVA, min verbalizations at baseline, bedbound, adm w UTI & metabolic encephalopathy

## 2022-07-26 NOTE — SWALLOW BEDSIDE ASSESSMENT ADULT - SPECIFY REASON(S)
To assess for safest, least restrictive PO diet
Assess for safety of PO intake
Determine safest and least restrictive diet
reconsulted to assess swallowing to determine readiness for PO diet

## 2022-07-26 NOTE — PROGRESS NOTE ADULT - SUBJECTIVE AND OBJECTIVE BOX
SUBJECTIVE / INTERVAL HPI: Patient seen and examined at bedside. No overnight events.    VITAL SIGNS:  Vital Signs Last 24 Hrs  T(C): 36.6 (26 Jul 2022 06:15), Max: 36.8 (25 Jul 2022 14:10)  T(F): 97.8 (26 Jul 2022 06:15), Max: 98.2 (25 Jul 2022 14:10)  HR: 80 (26 Jul 2022 06:15) (80 - 86)  BP: 144/80 (26 Jul 2022 06:15) (143/70 - 149/72)  BP(mean): --  RR: 19 (26 Jul 2022 06:15) (18 - 19)  SpO2: 98% (26 Jul 2022 06:15) (98% - 99%)    Parameters below as of 26 Jul 2022 06:15  Patient On (Oxygen Delivery Method): room air        PHYSICAL EXAM:    General: Resting comfortably in bed; NAD  HEENT: NC/AT, EOMI, anicteric sclera, MMM, neck supple, no nasal discharge  Cardiac: RRR; normal S1/S2, no MRG, no LE edema, no JVD  Respiratory: CTAB; no wheezes, ronchi, increased work of breathing, retractions  Gastrointestinal: +BSx4, abdomen soft, NT/ND; no rebound or guarding  Genitourinary: no suprapubic tenderness; callejas*; normal external genitalia  Extremities: WWP, no clubbing or cyanosis; no peripheral edema  Vascular: 2+ radial and DP pulses bilaterally  Dermatologic: skin warm, dry and intact; no rashes, open wounds  Neurologic: AAOx3; no focal deficits  Psychiatric: affect and characteristics of appearance, verbalizations, behaviors are appropriate    MEDICATIONS:  MEDICATIONS  (STANDING):  dextrose 5% + lactated ringers. 1000 milliLiter(s) (40 mL/Hr) IV Continuous <Continuous>  enoxaparin Injectable 30 milliGRAM(s) SubCutaneous every 24 hours  potassium phosphate IVPB 30 milliMole(s) IV Intermittent once    MEDICATIONS  (PRN):      ALLERGIES:  Allergies    morphine (Other)    Intolerances        LABS:                        9.9    6.74  )-----------( 306      ( 26 Jul 2022 08:31 )             31.8     07-26    137  |  100  |  11  ----------------------------<  131<H>  3.8   |  29  |  0.45<L>    Ca    9.8      26 Jul 2022 08:31  Phos  2.5     07-26  Mg     1.7     07-26          CAPILLARY BLOOD GLUCOSE      POCT Blood Glucose.: 115 mg/dL (25 Jul 2022 19:26)      RADIOLOGY & ADDITIONAL TESTS: Reviewed.   SUBJECTIVE / INTERVAL HPI: Patient seen and examined at bedside. Pt developed diarrhea overnight. Per HHA at bedside and nurse, pt had 2 episodes of profuse diarrhea. No blood noted. Pt also bladder scanned w/ straight cath, draining 400cc urine. Unclear if scan was post void. Pt urinating on her own though as evidenced by wet sheets.    VITAL SIGNS:  Vital Signs Last 24 Hrs  T(C): 36.6 (26 Jul 2022 06:15), Max: 36.8 (25 Jul 2022 14:10)  T(F): 97.8 (26 Jul 2022 06:15), Max: 98.2 (25 Jul 2022 14:10)  HR: 80 (26 Jul 2022 06:15) (80 - 86)  BP: 144/80 (26 Jul 2022 06:15) (143/70 - 149/72)  BP(mean): --  RR: 19 (26 Jul 2022 06:15) (18 - 19)  SpO2: 98% (26 Jul 2022 06:15) (98% - 99%)    Parameters below as of 26 Jul 2022 06:15  Patient On (Oxygen Delivery Method): room air        PHYSICAL EXAM:    General: Resting comfortably in bed; NAD  HEENT: NC/AT, EOMI, anicteric sclera, MMM, neck supple, no nasal discharge  Cardiac: RRR; normal S1/S2, no MRG, no LE edema, no JVD  Respiratory: CTAB; no wheezes, ronchi, increased work of breathing, retractions  Gastrointestinal: +BSx4, abdomen soft, NT/ND; no rebound or guarding  Genitourinary: no suprapubic tenderness; callejas*; normal external genitalia  Extremities: WWP, no clubbing or cyanosis; no peripheral edema  Vascular: 2+ radial and DP pulses bilaterally  Dermatologic: skin warm, dry and intact; no rashes, open wounds  Neurologic: AAOx3; no focal deficits  Psychiatric: affect and characteristics of appearance, verbalizations, behaviors are appropriate    MEDICATIONS:  MEDICATIONS  (STANDING):  dextrose 5% + lactated ringers. 1000 milliLiter(s) (40 mL/Hr) IV Continuous <Continuous>  enoxaparin Injectable 30 milliGRAM(s) SubCutaneous every 24 hours  potassium phosphate IVPB 30 milliMole(s) IV Intermittent once    MEDICATIONS  (PRN):      ALLERGIES:  Allergies    morphine (Other)    Intolerances        LABS:                        9.9    6.74  )-----------( 306      ( 26 Jul 2022 08:31 )             31.8     07-26    137  |  100  |  11  ----------------------------<  131<H>  3.8   |  29  |  0.45<L>    Ca    9.8      26 Jul 2022 08:31  Phos  2.5     07-26  Mg     1.7     07-26          CAPILLARY BLOOD GLUCOSE      POCT Blood Glucose.: 115 mg/dL (25 Jul 2022 19:26)      RADIOLOGY & ADDITIONAL TESTS: Reviewed.   SUBJECTIVE / INTERVAL HPI: Patient seen and examined at bedside. Pt developed diarrhea overnight. Per HHA at bedside and nurse, pt had 2 episodes of profuse diarrhea. No blood noted. Pt also bladder scanned w/ straight cath, draining 400cc urine. Unclear if scan was post void. Pt urinating on her own though as evidenced by wet sheets. This morning pt less response. Would not answer yes/no questions. Did not follow any commands.    VITAL SIGNS:  Vital Signs Last 24 Hrs  T(C): 36.6 (26 Jul 2022 06:15), Max: 36.8 (25 Jul 2022 14:10)  T(F): 97.8 (26 Jul 2022 06:15), Max: 98.2 (25 Jul 2022 14:10)  HR: 80 (26 Jul 2022 06:15) (80 - 86)  BP: 144/80 (26 Jul 2022 06:15) (143/70 - 149/72)  BP(mean): --  RR: 19 (26 Jul 2022 06:15) (18 - 19)  SpO2: 98% (26 Jul 2022 06:15) (98% - 99%)    Parameters below as of 26 Jul 2022 06:15  Patient On (Oxygen Delivery Method): room air        PHYSICAL EXAM:    General: Resting in bed; NAD; eyes closed; thin, cachectic   HEENT: NC/AT, anicteric sclera, dry MM, neck stiff  Cardiac: RRR; holosystolic murmur, S1 w/ muted S2, no LE edema  Respiratory: CTAB anteriorly w/ transmitted upper airways sounds; no wheezes, increased work of breathing, retractions  Gastrointestinal: +BSx4, abdomen soft, NT/ND; no rebound or guarding  Genitourinary: no callejas  Extremities: warm, no edema  Vascular: 2+ radial pulses bilaterally  Dermatologic: skin warm, dry and intact  Neurologic: AAOx0; bedbound, contractures in all 4 extremities, stiff neck; does not follow commands; does not answer yes/no questions this morning, no vocalization; appears drowsy  Psychiatric: affect and characteristics of appearance, verbalizations, behaviors are appropriate    MEDICATIONS:  MEDICATIONS  (STANDING):  dextrose 5% + lactated ringers. 1000 milliLiter(s) (40 mL/Hr) IV Continuous <Continuous>  enoxaparin Injectable 30 milliGRAM(s) SubCutaneous every 24 hours  potassium phosphate IVPB 30 milliMole(s) IV Intermittent once    MEDICATIONS  (PRN):      ALLERGIES:  Allergies    morphine (Other)    Intolerances        LABS:                        9.9    6.74  )-----------( 306      ( 26 Jul 2022 08:31 )             31.8     07-26    137  |  100  |  11  ----------------------------<  131<H>  3.8   |  29  |  0.45<L>    Ca    9.8      26 Jul 2022 08:31  Phos  2.5     07-26  Mg     1.7     07-26          CAPILLARY BLOOD GLUCOSE      POCT Blood Glucose.: 115 mg/dL (25 Jul 2022 19:26)      RADIOLOGY & ADDITIONAL TESTS: Reviewed.

## 2022-07-26 NOTE — SWALLOW BEDSIDE ASSESSMENT ADULT - SLP GENERAL OBSERVATIONS
Received sleeping in bed, awoke to noxious stimuli but with no attempt to communicate with verbal or non-verbal language. According to HHA, at bedside, this is a change from baseline. Although pt has been non-verbal following prior CVAs, basic receptive language skills remained intact, and pt was able to nod to respond to yes/no questions. Up until a few days prior to admission, pt was eating and drinking (although required 1:1 feeding assistance).
Pt was received awake in bed w HHA at bedside. Mouth was wide open w head/neck in extension, which HHA reported was her baseline posture even prior to this hospitalization. Pt only responded "yea" and "okay" when asked if she wanted to eat/drink. No other verbalizations were noted.
Sleeping w open-mouth posture at rest. Opens eyes briefly in response to name. No attempt made to close mouth during provision of oral care or when suctioning

## 2022-07-26 NOTE — PROGRESS NOTE ADULT - PROBLEM SELECTOR PLAN 11
Hx DM, no currently on any medication. A1C 4.8 (7/17/2022).  - SSI w/ tube feeds  - poct q6 given NPO

## 2022-07-26 NOTE — PROGRESS NOTE ADULT - PROBLEM SELECTOR PLAN 2
.  Multiple evaluations by S&S  -remains a total aspiration risk  -long term feeding tube would not change patient's disease trajectory  -allow pleasure feeds as tolerated  -PRNs as above in case of symptoms

## 2022-07-27 LAB
ANION GAP SERPL CALC-SCNC: 12 MMOL/L — SIGNIFICANT CHANGE UP (ref 5–17)
BASOPHILS # BLD AUTO: 0.1 K/UL — SIGNIFICANT CHANGE UP (ref 0–0.2)
BASOPHILS NFR BLD AUTO: 1.1 % — SIGNIFICANT CHANGE UP (ref 0–2)
BUN SERPL-MCNC: 11 MG/DL — SIGNIFICANT CHANGE UP (ref 7–23)
CALCIUM SERPL-MCNC: 10.2 MG/DL — SIGNIFICANT CHANGE UP (ref 8.4–10.5)
CHLORIDE SERPL-SCNC: 100 MMOL/L — SIGNIFICANT CHANGE UP (ref 96–108)
CO2 SERPL-SCNC: 25 MMOL/L — SIGNIFICANT CHANGE UP (ref 22–31)
CREAT SERPL-MCNC: 0.47 MG/DL — LOW (ref 0.5–1.3)
EGFR: 92 ML/MIN/1.73M2 — SIGNIFICANT CHANGE UP
EOSINOPHIL # BLD AUTO: 0.06 K/UL — SIGNIFICANT CHANGE UP (ref 0–0.5)
EOSINOPHIL NFR BLD AUTO: 0.7 % — SIGNIFICANT CHANGE UP (ref 0–6)
GLUCOSE BLDC GLUCOMTR-MCNC: 121 MG/DL — HIGH (ref 70–99)
GLUCOSE BLDC GLUCOMTR-MCNC: 123 MG/DL — HIGH (ref 70–99)
GLUCOSE BLDC GLUCOMTR-MCNC: 134 MG/DL — HIGH (ref 70–99)
GLUCOSE BLDC GLUCOMTR-MCNC: 139 MG/DL — HIGH (ref 70–99)
GLUCOSE SERPL-MCNC: 128 MG/DL — HIGH (ref 70–99)
HCT VFR BLD CALC: 33.5 % — LOW (ref 34.5–45)
HGB BLD-MCNC: 10.5 G/DL — LOW (ref 11.5–15.5)
IMM GRANULOCYTES NFR BLD AUTO: 0.4 % — SIGNIFICANT CHANGE UP (ref 0–1.5)
LYMPHOCYTES # BLD AUTO: 1.72 K/UL — SIGNIFICANT CHANGE UP (ref 1–3.3)
LYMPHOCYTES # BLD AUTO: 18.9 % — SIGNIFICANT CHANGE UP (ref 13–44)
MAGNESIUM SERPL-MCNC: 1.6 MG/DL — SIGNIFICANT CHANGE UP (ref 1.6–2.6)
MCHC RBC-ENTMCNC: 26.6 PG — LOW (ref 27–34)
MCHC RBC-ENTMCNC: 31.3 GM/DL — LOW (ref 32–36)
MCV RBC AUTO: 85 FL — SIGNIFICANT CHANGE UP (ref 80–100)
MONOCYTES # BLD AUTO: 0.8 K/UL — SIGNIFICANT CHANGE UP (ref 0–0.9)
MONOCYTES NFR BLD AUTO: 8.8 % — SIGNIFICANT CHANGE UP (ref 2–14)
NEUTROPHILS # BLD AUTO: 6.38 K/UL — SIGNIFICANT CHANGE UP (ref 1.8–7.4)
NEUTROPHILS NFR BLD AUTO: 70.1 % — SIGNIFICANT CHANGE UP (ref 43–77)
NRBC # BLD: 0 /100 WBCS — SIGNIFICANT CHANGE UP (ref 0–0)
PHOSPHATE SERPL-MCNC: 3.2 MG/DL — SIGNIFICANT CHANGE UP (ref 2.5–4.5)
PLATELET # BLD AUTO: 375 K/UL — SIGNIFICANT CHANGE UP (ref 150–400)
POTASSIUM SERPL-MCNC: 4 MMOL/L — SIGNIFICANT CHANGE UP (ref 3.5–5.3)
POTASSIUM SERPL-SCNC: 4 MMOL/L — SIGNIFICANT CHANGE UP (ref 3.5–5.3)
RBC # BLD: 3.94 M/UL — SIGNIFICANT CHANGE UP (ref 3.8–5.2)
RBC # FLD: 15.1 % — HIGH (ref 10.3–14.5)
SODIUM SERPL-SCNC: 137 MMOL/L — SIGNIFICANT CHANGE UP (ref 135–145)
WBC # BLD: 9.1 K/UL — SIGNIFICANT CHANGE UP (ref 3.8–10.5)
WBC # FLD AUTO: 9.1 K/UL — SIGNIFICANT CHANGE UP (ref 3.8–10.5)

## 2022-07-27 PROCEDURE — 99233 SBSQ HOSP IP/OBS HIGH 50: CPT

## 2022-07-27 PROCEDURE — 99233 SBSQ HOSP IP/OBS HIGH 50: CPT | Mod: GC

## 2022-07-27 RX ORDER — SODIUM CHLORIDE 9 MG/ML
1000 INJECTION, SOLUTION INTRAVENOUS
Refills: 0 | Status: DISCONTINUED | OUTPATIENT
Start: 2022-07-27 | End: 2022-07-28

## 2022-07-27 RX ORDER — SCOPALAMINE 1 MG/3D
1 PATCH, EXTENDED RELEASE TRANSDERMAL
Refills: 0 | Status: DISCONTINUED | OUTPATIENT
Start: 2022-07-27 | End: 2022-07-28

## 2022-07-27 RX ORDER — MAGNESIUM SULFATE 500 MG/ML
2 VIAL (ML) INJECTION ONCE
Refills: 0 | Status: COMPLETED | OUTPATIENT
Start: 2022-07-27 | End: 2022-07-27

## 2022-07-27 RX ADMIN — ENOXAPARIN SODIUM 30 MILLIGRAM(S): 100 INJECTION SUBCUTANEOUS at 18:28

## 2022-07-27 RX ADMIN — Medication 25 GRAM(S): at 17:23

## 2022-07-27 RX ADMIN — SODIUM CHLORIDE 40 MILLILITER(S): 9 INJECTION, SOLUTION INTRAVENOUS at 23:34

## 2022-07-27 NOTE — PROGRESS NOTE ADULT - SUBJECTIVE AND OBJECTIVE BOX
ADRIANNE OMALLEY, 88y, Female  MRN-[id number]  Patient is a 88y old  Female who presents with a chief complaint of AMS (26 Jul 2022 18:13)    OVERNIGHT EVENTS:     SUBJECTIVE:    12 Point ROS Negative unless noted otherwise above.  -------------------------------------------------------------------------------  VITAL SIGNS:  Vital Signs Last 24 Hrs  T(C): 36.6 (27 Jul 2022 06:05), Max: 36.6 (27 Jul 2022 06:05)  T(F): 97.8 (27 Jul 2022 06:05), Max: 97.8 (27 Jul 2022 06:05)  HR: 92 (27 Jul 2022 06:05) (74 - 92)  BP: 126/83 (27 Jul 2022 06:05) (126/83 - 155/73)  BP(mean): --  RR: 19 (27 Jul 2022 06:05) (18 - 19)  SpO2: 100% (27 Jul 2022 06:05) (95% - 100%)    Parameters below as of 26 Jul 2022 22:23  Patient On (Oxygen Delivery Method): room air    I&O's Summary    25 Jul 2022 07:01  -  26 Jul 2022 07:00  --------------------------------------------------------  IN: 0 mL / OUT: 500 mL / NET: -500 mL    PHYSICAL EXAM:  General: Resting in bed; NAD; eyes closed; thin, cachectic   HEENT: NC/AT, anicteric sclera, dry MM, neck stiff  Cardiac: RRR; holosystolic murmur, S1 w/ muted S2, no LE edema  Respiratory: CTAB anteriorly w/ transmitted upper airways sounds; no wheezes, increased work of breathing, retractions  Gastrointestinal: +BSx4, abdomen soft, NT/ND; no rebound or guarding  Genitourinary: no callejas  Extremities: warm, no edema  Vascular: 2+ radial pulses bilaterally  Dermatologic: skin warm, dry and intact  Neurologic: AAOx0; bedbound, contractures in all 4 extremities, stiff neck; does not follow commands; does not answer yes/no questions this morning, no vocalization; appears drowsy  Psychiatric: affect and characteristics of appearance, verbalizations, behaviors are appropriate    ALLERGIES:  morphine (Other)    MEDICATIONS  (STANDING):  dextrose 5% + lactated ringers. 1000 milliLiter(s) (40 mL/Hr) IV Continuous <Continuous>  enoxaparin Injectable 30 milliGRAM(s) SubCutaneous every 24 hours    MEDICATIONS  (PRN):  bisacodyl Suppository 10 milliGRAM(s) Rectal daily PRN Constipation  glycopyrrolate Injectable 0.2 milliGRAM(s) IV Push every 6 hours PRN Excessive Secretions  HYDROmorphone  Injectable 0.25 milliGRAM(s) IV Push every 4 hours PRN Severe Pain (7 - 10) or RR >22  -------------------------------------------------------------------------------  LABS:                        9.9    6.74  )-----------( 306      ( 26 Jul 2022 08:31 )             31.8     07-26    137  |  100  |  11  ----------------------------<  131<H>  3.8   |  29  |  0.45<L>    Ca    9.8      26 Jul 2022 08:31  Phos  2.5     07-26  Mg     1.7     07-26    CAPILLARY BLOOD GLUCOSE  POCT Blood Glucose.: 123 mg/dL (27 Jul 2022 00:33)    COVID-19 PCR: NotDetec (24 Jul 2022 07:30)    RADIOLOGY & ADDITIONAL TESTS: Reviewed. ADRIANNE OMALLEY, 88y, Female  MRN-[id number]  Patient is a 88y old  Female who presents with a chief complaint of AMS (26 Jul 2022 18:13)    OVERNIGHT EVENTS: JUAN    SUBJECTIVE: Patient's caretaker is at bedside. States she is at her baseline and may be initiating swallow more than previous days. At the time of the encounter she was feeding her small bites of jello.     12 Point ROS Negative unless noted otherwise above.  -------------------------------------------------------------------------------  VITAL SIGNS:  Vital Signs Last 24 Hrs  T(C): 36.6 (27 Jul 2022 06:05), Max: 36.6 (27 Jul 2022 06:05)  T(F): 97.8 (27 Jul 2022 06:05), Max: 97.8 (27 Jul 2022 06:05)  HR: 92 (27 Jul 2022 06:05) (74 - 92)  BP: 126/83 (27 Jul 2022 06:05) (126/83 - 155/73)  RR: 19 (27 Jul 2022 06:05) (18 - 19)  SpO2: 100% (27 Jul 2022 06:05) (95% - 100%) room air    I&O's Summary  25 Jul 2022 07:01  -  26 Jul 2022 07:00  --------------------------------------------------------  IN: 0 mL / OUT: 500 mL / NET: -500 mL    PHYSICAL EXAM:  General: Resting in bed; NAD; eyes open; thin, cachectic   HEENT: NC/AT, anicteric sclera, dry MM, neck stiff, mouth open  Cardiac: RRR; holosystolic murmur, S1 w/ muted S2, no LE edema  Respiratory: CTAB anteriorly w/ transmitted upper airways sounds; no wheezes, increased work of breathing, retractions  Gastrointestinal: +BSx4, abdomen soft, NT/ND; no rebound or guarding  Extremities: warm, no edema  Vascular: 2+ radial pulses bilaterally  Dermatologic: skin warm, dry and intact  Neurologic: AAOx0; bedbound, contractures in all 4 extremities, stiff neck; does not follow commands; does not answer yes/no questions this morning, no vocalization; appears drowsy    ALLERGIES:  morphine (Other)    MEDICATIONS  (STANDING):  dextrose 5% + lactated ringers. 1000 milliLiter(s) (40 mL/Hr) IV Continuous <Continuous>  enoxaparin Injectable 30 milliGRAM(s) SubCutaneous every 24 hours    MEDICATIONS  (PRN):  bisacodyl Suppository 10 milliGRAM(s) Rectal daily PRN Constipation  glycopyrrolate Injectable 0.2 milliGRAM(s) IV Push every 6 hours PRN Excessive Secretions  HYDROmorphone  Injectable 0.25 milliGRAM(s) IV Push every 4 hours PRN Severe Pain (7 - 10) or RR >22  -------------------------------------------------------------------------------  LABS:                        9.9    6.74  )-----------( 306      ( 26 Jul 2022 08:31 )             31.8     07-26    137  |  100  |  11  ----------------------------<  131<H>  3.8   |  29  |  0.45<L>    Ca    9.8      26 Jul 2022 08:31  Phos  2.5     07-26  Mg     1.7     07-26    CAPILLARY BLOOD GLUCOSE  POCT Blood Glucose.: 123 mg/dL (27 Jul 2022 00:33)    COVID-19 PCR: NotDetec (24 Jul 2022 07:30)    RADIOLOGY & ADDITIONAL TESTS: Reviewed.

## 2022-07-27 NOTE — PROGRESS NOTE ADULT - PROBLEM SELECTOR PLAN 3
Pt with two episodes of new onset diarrhea yesterday night 7/25, no hematochezia/melena. Likely 2/2 tube feeds in addition to pt receiving miralax (new medication). Pt afebrile without leukocytosis, low c/f C diff, though can consider if diarrhea persists, ramin in context of recent abx for UTI tx.  - monitor BMs  - monitor electrolytes (pt NPO)

## 2022-07-27 NOTE — PROGRESS NOTE ADULT - ASSESSMENT
87yo F with PMH of CVA, HTN, HLD, bedbound/nonverbal at baseline p/w encephalopathy and found to have UTI and dysphagia. Palliative consulted for complex medical decision making in the setting of advanced illness.    ·	recommend Scopolamine Patch for better secretion management  ·	restart pleasure feeds with pureed diet, discussed cautious feeding as tolerated

## 2022-07-27 NOTE — PROGRESS NOTE ADULT - ASSESSMENT
89 yo with ho of HTN, HLD, multiple CVAs (bed bound w/ 24/7 HHA, non verbal, cognitive impairment) brought in after being found altered and decreased PO intake, found to have UTI s/p treatment and new O2 requirements (now resolved), now being assessed for dysphagia with NG tube and ongoing goals of care discussions.

## 2022-07-27 NOTE — PROGRESS NOTE ADULT - PROBLEM SELECTOR PLAN 12
F: none  E: replete to K>4, Mg>2, Phos>2.5  N: per nutrition recs through [facility]  Ppx: lovenox 30mg qD    Code Status: DNR - MOLST in chart     Dispo: home vs home w/ hospice Yes

## 2022-07-27 NOTE — PROGRESS NOTE ADULT - PROBLEM SELECTOR PLAN 6
.  Complex medical decision making / symptom management in the setting of advanced illness.    Will continue to follow for ongoing titration of palliative regimen. Emotional support provided, questions answered.  Active Psychosocial Referrals:  [x]Social Work/Case management []PT/OT []Chaplaincy [x]Hospice  []Patient/Family Support []Holistic RN []Massage Therapy [x]Ethics  Coping: [] well [] with difficulty [] poor coping [x] unable to assess  Support system: [x] strong [] adequate [] inadequate    For new or uncontrolled symptoms, please call Palliative Care at [phone]. The service is available 24/7 (including nights & weekends) to provide symptom management recommendations over the phone as appropriate

## 2022-07-27 NOTE — PROGRESS NOTE ADULT - ATTENDING COMMENTS
acute component of encephalopathy improving significantly    repeat swallow  if there is aspiration will need to discuss with proxy about PEG tube, given her end stage dementia we would recommend against a PEG at this time but if patients wishes would be to have it, would be reasonable to consider
there has been some improvement in mental status, she nods yes or no to questions appropriately  however she is still unable to initiate oral phase of swallowing per repeat swallow eval today  continue GOC discussion with family. neurology will sign off, call back with further questions
88F with multiple CVA (bedbound, 24/7 HHA, non-verbal), HTN and HLD presenting from home with altered mental status and failure to thrive. Most of the history was obtained from patient's HHA who brought her for evaluation.   As per HHA, it seems that she has been having trouble swallowing solid food and may have had a few aspiration events leading up to her presentation.   Additionally, she was found to have a positive UA indicative of UTI. Lactate negative.    # Acute Metabolic encephalopathy likely secondary to an infectious etiology (UTI vs. pneumonia)  # Uncomplicated Urinary tract infection  # Suspected Gram negative Pneumonia   # Oropharyngeal Dysphagia   # Failure to thrive  # History of prior CVAs  # Hypertension  # Hyperlipidemia  # Functional Quadriplegia     Plan:  - IV fluid hydration with D5LR at 80ml/hr   - F/u Urine and Blood cx   - patient does not have any prior hx of Pseudomonas infection , she does not have any concern for abd infection , zosyn will be changed to Ceftriaxone for total of 5 days to cover for both Uncomplicated UTI and Possible aspiration or gram negative Pneumonia   - NPO for now, SLP evaluation  - Strict aspiration precautions  - Palliative care consult  - DVT ppx .    Sister is the HCP   Phone number in the Chart     Barriers to disp : 1. Goals of care conversation 2. Infection Control
Patient seen and examined.  Agree with resident note.  Meds, labs and vitals all reviewed.  Patient with previous CVA, encephalopathy, dysphagia.   Goals of care have been on going.  Patient is s/p NGT placement on Friday, and has been receiving nutrition.   Mental status is slightly better but not at baseline.  As per aide at bedside, patient has been reaching for NGT and she has had to redirect patient to prevent her from removing it.  Patient is also s/p a course of Zosyn for UTI during hospitalization.   Goals of care about artificial nutrition to continue tomorrow.
# Acute Metabolic encephalopathy likely secondary to an infectious etiology (UTI)   # Urinary tract infection - Culture + for Pseudomonas   # Suspected Gram negative Pneumonia   # Oropharyngeal Dysphagia   # Failure to thrive  # Vascular Dementia due to prior CVA   # Hypertension  # Hyperlipidemia  # Functional Quadriplegia   # Iron Deficiency Anemia   # Acute Hypoxic Respiratory Failure     Plan:  - IV fluid hydration with D5LR   - Zosyn for total of 5 days to cover for both Pseudomonas UTI and Possible aspiration or gram negative Pneumonia   - NPO for now, SLP evaluation  - Strict aspiration precautions  - Palliative care , Neurology and Ethics Consult   - Would not recommend PEG tube as it would not reverse her vascular dementia and Would not concur with the goals of care of the patient     Sister is the HCP   Phone number in the Chart     Barriers to disp : 1. Goals of care conversation 2. Infection Control.
87 yo with ho of HTN, HLD, multiple CVAs (bed bound w/ 24/7 HHA, non verbal, cognitive impairment) brought in after being found altered and decreased PO intake, found to have UTI s/p treatment and new O2 requirements (now resolved), now being assessed for dysphagia with NG tube and ongoing goals of care discussions. Patient s/p UTI treatment without return to baseline mental status as reported. Given degree of dementia and lack of swallow suspect patient with chronic aspirations at home just gone unnoticed. She is unable to tolerate own secretions with open mouth breathing leading to adv dementia with decline in function and would be appropriate for Hospice at this time. Will need GOC discussion again with sister as patient unable to tolerate PO and would not be able to tolerate further.
89 yo with ho of HTN, HLD, multiple CVAs (bed bound w/ 24/7 HHA, non verbal, cognitive impairment) brought in after being found altered and decreased PO intake, found to have UTI s/p treatment and new O2 requirements (now resolved. GOC discussion with family, palliative and ethics. Discussed unlikeliness of recovery and ability to swallow, focus of care should be on comfort and preventing symptoms from saliva aspiration. Family in agreement. Will follow up in morning with referral for [facility].
89 yo with ho of HTN, HLD, multiple CVAs (bed bound w/ 24/7 HHA, non verbal, cognitive impairment) brought in after being found altered and decreased PO intake, found to have UTI s/p treatment and new O2 requirements (now resolved. GOC discussion with family, palliative and ethics. Discussed unlikeliness of recovery and ability to swallow, focus of care should be on comfort and preventing symptoms from saliva aspiration. Family in agreement. Dispo for [facility].
89 yr old with vascular dementia , baseline can mumble monosyllables, eats soft food when fed      # Acute Metabolic encephalopathy likely secondary to an infectious etiology (UTI)   # Urinary tract infection - Culture + for Pseudomonas   # Suspected Gram negative Pneumonia   # Oropharyngeal Dysphagia   # Failure to thrive  # Vascular Dementia due to prior CVA   # Hypertension  # Hyperlipidemia  # Functional Quadriplegia   # Iron Deficiency Anemia   # Acute Hypoxic Respiratory Failure     Plan:  -DC D5LR once Tube Feeding initiated   - Zosyn for total of 5 days to cover for both Pseudomonas UTI and Possible aspiration or gram negative Pneumonia   - NPO for now, SLP evaluation  - Palliative care , Neurology and Ethics Consult   - Would not recommend PEG tube as it would not reverse her vascular dementia and Would not align with the goals of care of the patient     Sister is the HCP   Phone number in the Chart     Discussed with Sister on 7/22 , NGT to be placed , Tube Feeding to be initiated , reassess mental status and SLP on 7/25     Barriers to disp : 1. Goals of care conversation
# Acute Metabolic encephalopathy likely secondary to an infectious etiology (UTI)   # Urinary tract infection - Culture + for Pseudomonas   # Suspected Gram negative Pneumonia   # Oropharyngeal Dysphagia   # Failure to thrive  # Vascular Dementia due to prior CVA   # Hypertension  # Hyperlipidemia  # Functional Quadriplegia   # Iron Deficiency Anemia   # Acute Hypoxic Respiratory Failure     Plan:  - IV fluid hydration with D5LR at 80ml/hr  - Zosyn for total of 5 days to cover for both Pseudomonas UTI and Possible aspiration or gram negative Pneumonia   - NPO for now, SLP evaluation  - Strict aspiration precautions  - Palliative care , Neurology and Ethics Consult   - Would not recommend NGT or PEG tube as both of them would not reverse her vascular dementia and Would not concur with the goals of care of the patient     Sister is the HCP   Phone number in the Chart     Barriers to disp : 1. Goals of care conversation 2. Infection Control.
88F with multiple CVA (bedbound, 24/7 HHA, non-verbal), HTN and HLD presenting from home with altered mental status and failure to thrive. Most of the history was obtained from patient's HHA who brought her for evaluation.   As per HHA, it seems that she has been having trouble swallowing solid food and may have had a few aspiration events leading up to her presentation.   Additionally, she was found to have a positive UA indicative of UTI. Lactate negative.    # Acute Metabolic encephalopathy likely secondary to an infectious etiology (UTI vs. pneumonia)  # Urinary tract infection - Culture + for Pseudomonas   # Suspected Gram negative Pneumonia   # Oropharyngeal Dysphagia   # Failure to thrive  # History of prior CVAs  # Hypertension  # Hyperlipidemia  # Functional Quadriplegia     Plan:  - IV fluid hydration with D5LR at 80ml/hr   - F/u Urine and Blood cx   - Zosyn for total of 5 days to cover for both Pseudomonas UTI and Possible aspiration or gram negative Pneumonia   - NPO for now, SLP evaluation  - Strict aspiration precautions, if Mental status continues to improve can attempt Swallow Evaluation in AM   - Palliative care consult  - DVT ppx .    Sister is the HCP   Phone number in the Chart     Barriers to disp : 1. Goals of care conversation 2. Infection Control.

## 2022-07-27 NOTE — PROGRESS NOTE ADULT - PROBLEM SELECTOR PLAN 4
*RESOLVED  UA with pyuria, bacteruria, LE, nitrites. Unable to assess if patient symptomatic however more altered/lethargic from baseline on admission. No leukocytosis, negative lactate, not meeting SIRS criteria.   - s/p zosyn 3.375g q6 IV for Pseudomonal coverage x 5 days (7/18-7/22)    - UCx: Pseudomonas  - BCx: gram + cocci in clusters, PCR coag neg  - BCx repeat: no growth  - Ustrep neg, U legionella pending *RESOLVED  UA with pyuria, bacteruria, LE, nitrites. Unable to assess if patient symptomatic however more altered/lethargic from baseline on admission. No leukocytosis, negative lactate, not meeting SIRS criteria.   - s/p zosyn 3.375g q6 IV for Pseudomonal coverage x 5 days (7/18-7/22)  - UCx: Pseudomonas  - BCx: gram + cocci in clusters, PCR coag neg  - BCx repeat: no growth  - Ustrep neg, U legionella pending

## 2022-07-27 NOTE — PROGRESS NOTE ADULT - PROBLEM SELECTOR PLAN 4
.  In the setting of frailty, previous CVA, and Vascular Dementia  -patient is FAST 7E, bedbound/nonverbal and now continuously aspirating  -hospice eligible and appropriate given bedbound status, continuous aspiration  -life expectancy appears to be weeks-months at best but more likely days-weeks since she will inevitably aspirate in a clinically significant manner

## 2022-07-27 NOTE — DISCHARGE NOTE NURSING/CASE MANAGEMENT/SOCIAL WORK - PATIENT PORTAL LINK FT
You can access the FollowMyHealth Patient Portal offered by [facility] by registering at the following website: [email or web address]. By joining [facility]’s FollowMyHealth portal, you will also be able to view your health information using other applications (apps) compatible with our system.

## 2022-07-27 NOTE — PROGRESS NOTE ADULT - PROBLEM SELECTOR PLAN 1
.  -c/w Robinul 0.2mg IV q6h PRN for Excessive Secretions  -c/w Dilaudid 0.25mg IV q4h PRN for Severe Pain or RR >22  -recommend Scopolamine Patch for better secretion management

## 2022-07-27 NOTE — PROGRESS NOTE ADULT - PROBLEM SELECTOR PLAN 5
Hx CVA and dementia. Home med ASA, plavix. Non verbal bedbound at baseline. Ongoing goals of care conversations with sister health care proxy.  - home meds: asa 81mg & plavix (restart w/ NGT)  - palliative consult  - ethics consult  - neuro consult: swallow function may recover Hx CVA and dementia. Home med ASA, plavix. Non verbal bedbound at baseline. Ongoing goals of care conversations with sister health care proxy.  - home meds: asa 81mg & plavix (restart w/ NGT)  - palliative on board  - ethics on board   - neuro consult: swallow function may recover

## 2022-07-27 NOTE — PROGRESS NOTE ADULT - PROBLEM SELECTOR PLAN 1
Pt with decreased PO intake per HHA for few days leading up to admission. Speech and swallow evaluation x3 over duration of pt stay, pt continues to not able to initiate swallow. Dobhoff tube in placed over the weekend, now removed. Pt on maintenance fluids. Continuing goals of care discussion with sister Talita Haney (HCP).  - continue goals of care discussions, palliative on board  - plan for family meeting at 3:30pm 7/25    - f/u speech recs   - f/u nutrition recs  - dobhoff tube placement 7/22-7/25  - maintenance fluids D5 in LR at 40cc/hr  - NPO, aspiration precautions  - POCT q6 Pt with decreased PO intake per HHA for few days leading up to admission. Speech and swallow evaluation x3 over duration of pt stay, pt continues to not able to initiate swallow. Dobhoff tube in placed over the weekend, now removed. Pt on maintenance fluids. Continuing goals of care discussion with sister Talita Haney (HCP).  - continue goals of care discussions, palliative on board  - s/p dobhoff tube placement 7/22-7/25  - speech: Discussed importance of oral hygiene and use of moistened swabs for hydration with HHA. Signed off  - nutrition: Align nutrition with goals of care  - maintenance fluids D5 in LR at 40cc/hr  - NPO, aspiration precautions  - POCT q6

## 2022-07-27 NOTE — PROGRESS NOTE ADULT - PROBLEM SELECTOR PLAN 5
.  Patient is DNR/DNI, MOL in chart  -sister (Talita) is designated HCP  -strong [employer] igor tradition  -family is amenable to [facility] referral for continued supportive care and management at end of life

## 2022-07-27 NOTE — PROGRESS NOTE ADULT - PROBLEM SELECTOR PLAN 2
Pt s/p CVA, baseline non verbal w/ cognitive impairment and bedbound. Per HHA had been more altered/lethargic from baseline for few days with decreased PO intake likely 2/2 UTI (UA with pyuria, bacteriuria). UCx growing Pseudomonas s/p zosyn treatment 3.375g x5days.    Pt mental status improved through admission, per HHA appears to be closer to home baseline (A&Ox0, bedbound) except for dysphagia. Pt less responsive this morning on exam. Pt s/p CVA, baseline non verbal w/ cognitive impairment and bedbound. Per HHA had been more altered/lethargic from baseline for few days with decreased PO intake likely 2/2 UTI (UA with pyuria, bacteriuria). UCx growing Pseudomonas s/p zosyn treatment 3.375g x5days.  -Pt mental status improved through admission, per HHA appears to be closer to home baseline (A&Ox0, bedbound) except for dysphagia. Per HHA, patient at baseline today with improved swallow initiation.

## 2022-07-27 NOTE — PROGRESS NOTE ADULT - SUBJECTIVE AND OBJECTIVE BOX
[facility] Pall[facility]  Tom Ronquillo, Palliative Care Attending  Contact Info: Call [phone] (HEAL Line) or message on [employer] Teams (Tom Ronquillo)    SUBJECTIVE AND OBJECTIVE:  INTERVAL HPI/OVERNIGHT EVENTS: Interval events noted. Patient resting comfortably, unable to participate in interview. Aide at bedside endorses significant secretions requiring suctioning No PRN use for the past 24hrs noted below. Extensive time spent discussing plan of care with family.     ALLERGIES:  morphine (Other)    MEDICATIONS  (STANDING):  dextrose 5% + lactated ringers. 1000 milliLiter(s) (40 mL/Hr) IV Continuous <Continuous>  enoxaparin Injectable 30 milliGRAM(s) SubCutaneous every 24 hours  scopolamine 1 mG/72 Hr(s) Patch 1 Patch Transdermal every 72 hours    MEDICATIONS  (PRN):  bisacodyl Suppository 10 milliGRAM(s) Rectal daily PRN Constipation  glycopyrrolate Injectable 0.2 milliGRAM(s) IV Push every 6 hours PRN Excessive Secretions  HYDROmorphone  Injectable 0.25 milliGRAM(s) IV Push every 4 hours PRN Severe Pain (7 - 10) or RR >22    Analgesic Use (Scheduled and PRNs) for past 24 hours:    ITEMS UNCHECKED ARE NOT PRESENT  PRESENT SYMPTOMS/REVIEW OF SYSTEMS: [x]Unable to obtain due to poor mentation   Source if other than patient:  [x]Family   []Team     Pain: [] yes [x] no  QOL impact -  Location -  Aggravating factors -  Quality -  Radiation -  Timing -  Severity (0-10 scale) -  Minimal acceptable level (0-10 scale) -    PAINAD Score: 0    Dyspnea:                           []Mild  []Moderate []Severe  Anxiety:                             []Mild []Moderate []Severe  Fatigue:                             []Mild []Moderate []Severe  Nausea:                             []Mild []Moderate []Severe  Loss of appetite:              []Mild []Moderate []Severe  Constipation:                    []Mild []Moderate []Severe    Other Symptoms: +oral secretions  [x]All other review of systems negative     Palliative Performance Status Version 2: 20%    GENERAL:  []Alert  []Oriented x   [x]Lethargic  []Cachexia  []Unarousable  []Verbal  [x]Non-Verbal  Behavioral:   []Anxiety  [x]Delirium []Agitation []Cooperative  HEENT:  [x]Normal   []Dry mouth   []ET Tube/Trach  []Oral lesions  PULMONARY:   []Clear []Tachypnea  [x]Audible excessive secretions  [x]Normal Work of Breathing []Labored Breathing  []Rhonchi []Crackles []Wheezing  CARDIOVASCULAR:    [x]Regular []Irregular []Tachy  []Brayan  GASTROINTESTINAL:  [x]Soft  []Distended   [x]+BS  [x]Non tender []Tender  []PEG []OGT/ NGT  Last BM:  GENITOURINARY:  []Normal [x] Incontinent   []Oliguria/Anuria   []Puente  MUSCULOSKELETAL:   []Normal   []Weakness  [x]Bed/Wheelchair bound []Edema  NEUROLOGIC:   []No focal deficits  [x]Cognitive impairment  []Dysphagia []Dysarthria []Paresis [x]Encephalopathic  SKIN:   [x]Normal   []Pressure ulcer(s)  []Rash    Vital Signs Last 24 Hrs  T(C): 36.6 (27 Jul 2022 06:05), Max: 36.6 (27 Jul 2022 06:05)  T(F): 97.8 (27 Jul 2022 06:05), Max: 97.8 (27 Jul 2022 06:05)  HR: 92 (27 Jul 2022 06:05) (84 - 92)  BP: 126/83 (27 Jul 2022 06:05) (126/83 - 152/76)  BP(mean): --  RR: 19 (27 Jul 2022 06:05) (18 - 19)  SpO2: 100% (27 Jul 2022 06:05) (95% - 100%)    Parameters below as of 26 Jul 2022 22:23  Patient On (Oxygen Delivery Method): room air    LABS:                         10.5   9.10  )-----------( 375      ( 27 Jul 2022 05:30 )             33.5   07-27    137  |  100  |  11  ----------------------------<  128<H>  4.0   |  25  |  0.47<L>    Ca    10.2      27 Jul 2022 05:30  Phos  3.2     07-27  Mg     1.6     07-27    RADIOLOGY & ADDITIONAL STUDIES: None new    DISCUSSION OF CASE: HHA - to provide updates and emotional support

## 2022-07-28 VITALS
DIASTOLIC BLOOD PRESSURE: 60 MMHG | TEMPERATURE: 97 F | HEART RATE: 72 BPM | OXYGEN SATURATION: 96 % | RESPIRATION RATE: 16 BRPM | SYSTOLIC BLOOD PRESSURE: 110 MMHG

## 2022-07-28 DIAGNOSIS — L89.90 PRESSURE ULCER OF UNSPECIFIED SITE, UNSPECIFIED STAGE: ICD-10-CM

## 2022-07-28 LAB — GLUCOSE BLDC GLUCOMTR-MCNC: 116 MG/DL — HIGH (ref 70–99)

## 2022-07-28 PROCEDURE — 80048 BASIC METABOLIC PNL TOTAL CA: CPT

## 2022-07-28 PROCEDURE — 85610 PROTHROMBIN TIME: CPT

## 2022-07-28 PROCEDURE — 94640 AIRWAY INHALATION TREATMENT: CPT

## 2022-07-28 PROCEDURE — 96365 THER/PROPH/DIAG IV INF INIT: CPT

## 2022-07-28 PROCEDURE — 85730 THROMBOPLASTIN TIME PARTIAL: CPT

## 2022-07-28 PROCEDURE — 86900 BLOOD TYPING SEROLOGIC ABO: CPT

## 2022-07-28 PROCEDURE — 85025 COMPLETE CBC W/AUTO DIFF WBC: CPT

## 2022-07-28 PROCEDURE — 36415 COLL VENOUS BLD VENIPUNCTURE: CPT

## 2022-07-28 PROCEDURE — 82553 CREATINE MB FRACTION: CPT

## 2022-07-28 PROCEDURE — 87186 SC STD MICRODIL/AGAR DIL: CPT

## 2022-07-28 PROCEDURE — 87899 AGENT NOS ASSAY W/OPTIC: CPT

## 2022-07-28 PROCEDURE — 71045 X-RAY EXAM CHEST 1 VIEW: CPT

## 2022-07-28 PROCEDURE — 99358 PROLONG SERVICE W/O CONTACT: CPT | Mod: GC

## 2022-07-28 PROCEDURE — 81001 URINALYSIS AUTO W/SCOPE: CPT

## 2022-07-28 PROCEDURE — 92610 EVALUATE SWALLOWING FUNCTION: CPT

## 2022-07-28 PROCEDURE — 99285 EMERGENCY DEPT VISIT HI MDM: CPT | Mod: 25

## 2022-07-28 PROCEDURE — 85027 COMPLETE CBC AUTOMATED: CPT

## 2022-07-28 PROCEDURE — 87635 SARS-COV-2 COVID-19 AMP PRB: CPT

## 2022-07-28 PROCEDURE — 83036 HEMOGLOBIN GLYCOSYLATED A1C: CPT

## 2022-07-28 PROCEDURE — 87150 DNA/RNA AMPLIFIED PROBE: CPT

## 2022-07-28 PROCEDURE — 84484 ASSAY OF TROPONIN QUANT: CPT

## 2022-07-28 PROCEDURE — 99233 SBSQ HOSP IP/OBS HIGH 50: CPT | Mod: GC

## 2022-07-28 PROCEDURE — 83605 ASSAY OF LACTIC ACID: CPT

## 2022-07-28 PROCEDURE — 84100 ASSAY OF PHOSPHORUS: CPT

## 2022-07-28 PROCEDURE — 96375 TX/PRO/DX INJ NEW DRUG ADDON: CPT

## 2022-07-28 PROCEDURE — 93005 ELECTROCARDIOGRAM TRACING: CPT

## 2022-07-28 PROCEDURE — 86901 BLOOD TYPING SEROLOGIC RH(D): CPT

## 2022-07-28 PROCEDURE — 80053 COMPREHEN METABOLIC PANEL: CPT

## 2022-07-28 PROCEDURE — 83735 ASSAY OF MAGNESIUM: CPT

## 2022-07-28 PROCEDURE — 82962 GLUCOSE BLOOD TEST: CPT

## 2022-07-28 PROCEDURE — 0225U NFCT DS DNA&RNA 21 SARSCOV2: CPT

## 2022-07-28 PROCEDURE — 83880 ASSAY OF NATRIURETIC PEPTIDE: CPT

## 2022-07-28 PROCEDURE — 86850 RBC ANTIBODY SCREEN: CPT

## 2022-07-28 PROCEDURE — 87184 SC STD DISK METHOD PER PLATE: CPT

## 2022-07-28 PROCEDURE — 82550 ASSAY OF CK (CPK): CPT

## 2022-07-28 PROCEDURE — 87086 URINE CULTURE/COLONY COUNT: CPT

## 2022-07-28 PROCEDURE — 87040 BLOOD CULTURE FOR BACTERIA: CPT

## 2022-07-28 PROCEDURE — 82803 BLOOD GASES ANY COMBINATION: CPT

## 2022-07-28 PROCEDURE — 99239 HOSP IP/OBS DSCHRG MGMT >30: CPT | Mod: GC

## 2022-07-28 RX ORDER — HYDROMORPHONE HYDROCHLORIDE 2 MG/ML
0.25 INJECTION INTRAMUSCULAR; INTRAVENOUS; SUBCUTANEOUS
Qty: 0 | Refills: 0 | DISCHARGE
Start: 2022-07-28

## 2022-07-28 RX ORDER — SCOPALAMINE 1 MG/3D
1 PATCH, EXTENDED RELEASE TRANSDERMAL
Qty: 0 | Refills: 0 | DISCHARGE
Start: 2022-07-28

## 2022-07-28 RX ORDER — ROBINUL 0.2 MG/ML
0.2 INJECTION INTRAMUSCULAR; INTRAVENOUS
Qty: 0 | Refills: 0 | DISCHARGE
Start: 2022-07-28

## 2022-07-28 RX ORDER — SODIUM CHLORIDE 9 MG/ML
0.6 INJECTION, SOLUTION INTRAVENOUS
Qty: 0 | Refills: 0 | DISCHARGE
Start: 2022-07-28

## 2022-07-28 RX ADMIN — HYDROMORPHONE HYDROCHLORIDE 0.25 MILLIGRAM(S): 2 INJECTION INTRAMUSCULAR; INTRAVENOUS; SUBCUTANEOUS at 10:29

## 2022-07-28 RX ADMIN — HYDROMORPHONE HYDROCHLORIDE 0.25 MILLIGRAM(S): 2 INJECTION INTRAMUSCULAR; INTRAVENOUS; SUBCUTANEOUS at 10:45

## 2022-07-28 RX ADMIN — HYDROMORPHONE HYDROCHLORIDE 0.25 MILLIGRAM(S): 2 INJECTION INTRAMUSCULAR; INTRAVENOUS; SUBCUTANEOUS at 15:13

## 2022-07-28 RX ADMIN — SCOPALAMINE 1 PATCH: 1 PATCH, EXTENDED RELEASE TRANSDERMAL at 13:31

## 2022-07-28 NOTE — PROGRESS NOTE ADULT - PROBLEM SELECTOR PLAN 1
.  -c/w Robinul 0.2mg IV q6h PRN for Excessive Secretions  -c/w Dilaudid 0.25mg IV q4h PRN for Severe Pain or RR >22  -c/w Scopolamine Patch, will take another day to see true effect

## 2022-07-28 NOTE — PROGRESS NOTE ADULT - PROBLEM SELECTOR PLAN 3
Pt with two episodes of new onset diarrhea yesterday night 7/25, no hematochezia/melena. Likely 2/2 tube feeds in addition to pt receiving miralax (new medication). Pt afebrile without leukocytosis, low c/f C diff, though can consider if diarrhea persists, ramin in context of recent abx for UTI tx.  - monitor BMs  - monitor electrolytes (pt NPO) Pt s/p CVA, baseline non verbal w/ cognitive impairment and bedbound. Per HHA had been more altered/lethargic from baseline for few days with decreased PO intake likely 2/2 UTI (UA with pyuria, bacteriuria). UCx growing Pseudomonas s/p zosyn treatment 3.375g x5days.  -Pt mental status improved through admission, per HHA appears to be closer to home baseline (A&Ox0, bedbound) except for dysphagia.

## 2022-07-28 NOTE — ADVANCED PRACTICE NURSE CONSULT - ASSESSMENT
87 yo with ho of HTN, HLD, multiple CVAs (bed bound w/ 24/7 HHA, non verbal, cognitive impairment) brought in after being found altered and decreased PO intake, found to have UTI and new O2 requirements.  Stage 2 pressure injury noted to left buttock measuring 3.4 x 4 cm with dermis exposed. Triad ointment applied over site, covered with foam dressing. Pt turned and repositioned with pillows. Offloading boots on bilaterally.

## 2022-07-28 NOTE — PROGRESS NOTE ADULT - NUTRITIONAL ASSESSMENT
This patient has been assessed with a concern for Malnutrition and has been determined to have a diagnosis/diagnoses of Severe protein-calorie malnutrition.    This patient is being managed with:   Diet NPO-  Entered: Jul 17 2022  4:24PM    
This patient has been assessed with a concern for Malnutrition and has been determined to have a diagnosis/diagnoses of Severe protein-calorie malnutrition.    This patient is being managed with:   Diet Pureed-  Mildly Thick Liquids (MILDTHICKLIQS)  Entered: Jul 27 2022  2:04PM    
This patient has been assessed with a concern for Malnutrition and has been determined to have a diagnosis/diagnoses of Severe protein-calorie malnutrition.    This patient is being managed with:   Diet NPO-  Entered: Jul 17 2022  4:24PM    
This patient has been assessed with a concern for Malnutrition and has been determined to have a diagnosis/diagnoses of Severe protein-calorie malnutrition.      
This patient has been assessed with a concern for Malnutrition and has been determined to have a diagnosis/diagnoses of Severe protein-calorie malnutrition.    This patient is being managed with:   Diet NPO with Tube Feed-  Tube Feeding Modality: Nasogastric  Jevity 1.2 Isaak (JEVITY1.2RTH)  Total Volume for 24 Hours (mL): 720  Total Number of Cans: 3  Continuous  Starting Tube Feed Rate {mL per Hour}: 30  Until Goal Tube Feed Rate (mL per Hour): 30  Tube Feed Duration (in Hours): 24  Tube Feed Start Time: 15:00  Entered: Jul 22 2022  2:28PM    
This patient has been assessed with a concern for Malnutrition and has been determined to have a diagnosis/diagnoses of Severe protein-calorie malnutrition.    This patient is being managed with:   Diet NPO with Tube Feed-  Tube Feeding Modality: Nasogastric  Jevity 1.2 Isaak (JEVITY1.2RTH)  Total Volume for 24 Hours (mL): 720  Total Number of Cans: 3  Continuous  Starting Tube Feed Rate {mL per Hour}: 30  Until Goal Tube Feed Rate (mL per Hour): 30  Tube Feed Duration (in Hours): 24  Tube Feed Start Time: 15:00  Entered: Jul 22 2022  2:28PM    
This patient has been assessed with a concern for Malnutrition and has been determined to have a diagnosis/diagnoses of Severe protein-calorie malnutrition.    This patient is being managed with:   Diet NPO-  Entered: Jul 17 2022  4:24PM    
This patient has been assessed with a concern for Malnutrition and has been determined to have a diagnosis/diagnoses of Severe protein-calorie malnutrition.    This patient is being managed with:   Diet NPO-  Entered: Jul 17 2022  4:24PM    
This patient has been assessed with a concern for Malnutrition and has been determined to have a diagnosis/diagnoses of Severe protein-calorie malnutrition.    This patient is being managed with:   Diet NPO with Tube Feed-  Tube Feeding Modality: Nasogastric  Jevity 1.2 Isaak (JEVITY1.2RTH)  Total Volume for 24 Hours (mL): 720  Total Number of Cans: 3  Continuous  Starting Tube Feed Rate {mL per Hour}: 30  Until Goal Tube Feed Rate (mL per Hour): 30  Tube Feed Duration (in Hours): 24  Tube Feed Start Time: 15:00  Entered: Jul 22 2022  2:28PM    
This patient has been assessed with a concern for Malnutrition and has been determined to have a diagnosis/diagnoses of Severe protein-calorie malnutrition.

## 2022-07-28 NOTE — PROGRESS NOTE ADULT - PROBLEM SELECTOR PLAN 10
Hx MDD.  - home meds: bupropion 150mg XL Known hx of anemia (baseline 2021 9.8). Iron studies in 03/2021 showed Fe borderline low at 29; TIBC low at 184. No signs of active bleeding, no melena. Per allscripts, taking ferrous sulfate 01/2022 but per formal med rec with HHA no longer taking.  - monitor H/H  - active T&S  - transfuse Hb <7.0

## 2022-07-28 NOTE — PROGRESS NOTE ADULT - PROBLEM SELECTOR PLAN 7
Hx HTN. Blood pressures elevated during admission.  - home meds: amlodipine 10mg, ramipril 20mg qd  - Currently on Norvasc 10mg and Lisinopril 40mg.     #HLD  - home meds: lipitor 40. *RESOLVED  No hx of COPD or lung disease, not on home O2. Initially placed on 15 L NRB then transitioned to 5L NC sat 99%. No accessory muscle use or incr WOB. CXR with elevated L hemidiaphragm, possibly positional, no acute infiltrates. CXR with no acute infiltrates, though consider aspiration PNA given dysphagia.  Pt O2 sat well on room air.  - monitor O2 saturations  - RVP neg

## 2022-07-28 NOTE — PROGRESS NOTE ADULT - PROBLEM SELECTOR PLAN 8
Presenting with troponin 0.02, unable to assess if patient is having chest pain. EKG x2 normal, no ST elevations. Repeat troponin 0.02.  - other cardiac enzymes WNL: , CKMB 1.9   - if up trending, repeat EKG Hx HTN. Blood pressures elevated during admission.  - home meds: amlodipine 10mg, ramipril 20mg qd  - Currently on Norvasc 10mg and Lisinopril 40mg.     #HLD  - home meds: lipitor 40.

## 2022-07-28 NOTE — PROGRESS NOTE ADULT - PROBLEM SELECTOR PLAN 6
.  Complex medical decision making / symptom management in the setting of advanced illness.    Hemodynamically stable for transfer to [place] today. Emotional support provided, questions answered.  Active Psychosocial Referrals:  [x]Social Work/Case management []PT/OT []Chaplaincy [x]Hospice  []Patient/Family Support []Holistic RN []Massage Therapy [x]Ethics  Coping: [] well [] with difficulty [] poor coping [x] unable to assess  Support system: [x] strong [] adequate [] inadequate    For new or uncontrolled symptoms, please call Palliative Care at [phone]. The service is available 24/7 (including nights & weekends) to provide symptom management recommendations over the phone as appropriate

## 2022-07-28 NOTE — ADVANCED PRACTICE NURSE CONSULT - RECOMMEDATIONS
Recommend Triad ointment to site daily, cover with foam dressing. Spoke with SUE Valderrama and house staff.

## 2022-07-28 NOTE — PROGRESS NOTE ADULT - PROBLEM SELECTOR PLAN 1
Pt with decreased PO intake per HHA for few days leading up to admission. Speech and swallow evaluation x3 over duration of pt stay, pt continues to not able to initiate swallow. Dobhoff tube in placed over the weekend, now removed. Pt on maintenance fluids. Continuing goals of care discussion with sister Talita Haney (HCP).  - continue goals of care discussions, palliative on board  - s/p dobhoff tube placement 7/22-7/25  - speech: Discussed importance of oral hygiene and use of moistened swabs for hydration with HHA. Signed off  - nutrition: Align nutrition with goals of care  - maintenance fluids D5 in LR at 40cc/hr  - NPO, aspiration precautions  - POCT q6 Patient with new pressure ulcer stage 2 on left buttocks noted overnight (7/27).   - Wound care consulted  - Will apply triad to pressure ulcer

## 2022-07-28 NOTE — PROGRESS NOTE ADULT - SUBJECTIVE AND OBJECTIVE BOX
[facility] Geriatrics and Palliative Care  Tom Ronquillo, Palliative Care Attending  Contact Info: Call [phone] (HEAL Line) or message on Microsoft Teams (Tom Ronquillo)    SUBJECTIVE AND OBJECTIVE:  INTERVAL HPI/OVERNIGHT EVENTS: Interval events noted. Unable to participate in interview. HHA at bedside endorses agitaiton, asked RN to provide Dilaudid PRN. See patient's PRN use for the past 24hrs noted below. Extensive time spent discussing plan of care with family. No unexpected adverse effects of opiates noted: no sedation/dizziness/nausea. Secretions are still evident.    ALLERGIES:  morphine (Other)    MEDICATIONS  (STANDING):  dextrose 5% + lactated ringers. 1000 milliLiter(s) (40 mL/Hr) IV Continuous <Continuous>  enoxaparin Injectable 30 milliGRAM(s) SubCutaneous every 24 hours  scopolamine 1 mG/72 Hr(s) Patch 1 Patch Transdermal every 72 hours    MEDICATIONS  (PRN):  bisacodyl Suppository 10 milliGRAM(s) Rectal daily PRN Constipation  glycopyrrolate Injectable 0.2 milliGRAM(s) IV Push every 6 hours PRN Excessive Secretions  HYDROmorphone  Injectable 0.25 milliGRAM(s) IV Push every 4 hours PRN Severe Pain (7 - 10) or RR >22      Analgesic Use (Scheduled and PRNs) for past 24 hours:  HYDROmorphone  Injectable   0.25 milliGRAM(s) IV Push (07-28-22 @ 10:29)  scopolamine 1 mG/72 Hr(s) Patch   1 Patch Transdermal (07-28-22 @ 13:31)    ITEMS UNCHECKED ARE NOT PRESENT  PRESENT SYMPTOMS/REVIEW OF SYSTEMS: [x]Unable to obtain due to poor mentation   Source if other than patient:  [x]Family   []Team     Pain: [] yes [x] no  QOL impact -  Location -  Aggravating factors -  Quality -  Radiation -  Timing -  Severity (0-10 scale) -  Minimal acceptable level (0-10 scale) -    PAINAD Score: 0    Dyspnea:                           []Mild  []Moderate []Severe  Anxiety:                             []Mild []Moderate []Severe  Fatigue:                             []Mild []Moderate []Severe  Nausea:                             []Mild []Moderate []Severe  Loss of appetite:              []Mild []Moderate []Severe  Constipation:                    []Mild []Moderate []Severe    Other Symptoms: +oral secretions  [x]All other review of systems negative     Palliative Performance Status Version 2: 20%    GENERAL:  []Alert  []Oriented x   [x]Lethargic  []Cachexia  []Unarousable  []Verbal  [x]Non-Verbal  Behavioral:   []Anxiety  [x]Delirium []Agitation []Cooperative  HEENT:  [x]Normal   []Dry mouth   []ET Tube/Trach  []Oral lesions  PULMONARY:   []Clear []Tachypnea  [x]Audible excessive secretions  [x]Normal Work of Breathing []Labored Breathing  []Rhonchi []Crackles []Wheezing  CARDIOVASCULAR:    [x]Regular []Irregular []Tachy  []Brayan  GASTROINTESTINAL:  [x]Soft  []Distended   [x]+BS  [x]Non tender []Tender  []PEG []OGT/ NGT  Last BM:  GENITOURINARY:  []Normal [x] Incontinent   []Oliguria/Anuria   []Puente  MUSCULOSKELETAL:   []Normal   []Weakness  [x]Bed/Wheelchair bound []Edema  NEUROLOGIC:   []No focal deficits  [x]Cognitive impairment  []Dysphagia []Dysarthria []Paresis [x]Encephalopathic  SKIN:   [x]Normal   []Pressure ulcer(s)  []Rash    Vital Signs Last 24 Hrs  T(C): 36.7 (28 Jul 2022 06:33), Max: 36.7 (27 Jul 2022 21:10)  T(F): 98 (28 Jul 2022 06:33), Max: 98.1 (27 Jul 2022 21:10)  HR: 86 (28 Jul 2022 06:33) (74 - 86)  BP: 116/78 (28 Jul 2022 06:33) (116/78 - 134/74)  BP(mean): --  RR: 19 (28 Jul 2022 06:33) (18 - 19)  SpO2: 97% (28 Jul 2022 06:33) (97% - 97%)    Parameters below as of 28 Jul 2022 06:33  Patient On (Oxygen Delivery Method): room air    LABS:                         10.5   9.10  )-----------( 375      ( 27 Jul 2022 05:30 )             33.5   07-27    137  |  100  |  11  ----------------------------<  128<H>  4.0   |  25  |  0.47<L>    Ca    10.2      27 Jul 2022 05:30  Phos  3.2     07-27  Mg     1.6     07-27    RADIOLOGY & ADDITIONAL STUDIES: None new    DISCUSSION OF CASE: Sister - to provide updates and emotional support

## 2022-07-28 NOTE — PROGRESS NOTE ADULT - PROBLEM SELECTOR PLAN 4
*RESOLVED  UA with pyuria, bacteruria, LE, nitrites. Unable to assess if patient symptomatic however more altered/lethargic from baseline on admission. No leukocytosis, negative lactate, not meeting SIRS criteria.   - s/p zosyn 3.375g q6 IV for Pseudomonal coverage x 5 days (7/18-7/22)  - UCx: Pseudomonas  - BCx: gram + cocci in clusters, PCR coag neg  - BCx repeat: no growth  - Ustrep neg, U legionella pending Pt with two episodes of new onset diarrhea yesterday night 7/25, no hematochezia/melena. Likely 2/2 tube feeds in addition to pt receiving miralax (new medication). Pt afebrile without leukocytosis, low c/f C diff, though can consider if diarrhea persists, ramin in context of recent abx for UTI tx.  - monitor BMs  - monitor electrolytes (pt NPO)

## 2022-07-28 NOTE — PROGRESS NOTE ADULT - ASSESSMENT
89yo F with PMH of CVA, HTN, HLD, bedbound/nonverbal at baseline p/w encephalopathy and found to have UTI and dysphagia. Palliative consulted for complex medical decision making in the setting of advanced illness.    ·	asked RN to provide Dilaudid PRN for apparent distress  ·	planned for transition to [facility] this afternoon  ·	continue with medications as ordered on discharge Med Rec (except for Lovenox)

## 2022-07-28 NOTE — PROGRESS NOTE ADULT - NS ATTEST RISK PROBLEM GEN_ALL_CORE FT
Chronic Illness With Severe Exacerbation/Progression  Decision Made To De-escalate Care Due To Poor Prognosis  Prescription Of Parenteral Controlled Substances
Decision Made To De-escalate Care Due To Poor Prognosis  Prescription Of Parenteral Controlled Substances

## 2022-07-28 NOTE — PROGRESS NOTE ADULT - PROBLEM SELECTOR PLAN 5
.  Patient is DNR/DNI, MOL in chart  -sister (Talita) is designated HCP  -strong [employer] igor tradition  -family is amenable to [facility] referral for continued supportive care and management at end of life .  Patient is DNR/DNI, SANDIE in chart  -sister (Talita) is designated HCP  -strong [employer]od[employer] igor tradition  -family is amenable to [facility] referral for continued supportive care and management at end of life    Patient assessed on 07-28-22 to manage: GOC/Advanced Directives, Symptoms, and Supportive Care. 31 Minutes; Start: 10:00AM,End: 10:31AM, Non-Face-to-Face prolonged service provided that relates to Face-to-Face care that has occurred and ongoing patient management, including one or more of the following: - Reviewed documentation from other physicians and other health care professional services - Reviewed other medical records and diagnostic / radiology study results - Coordination with patient's support system

## 2022-07-28 NOTE — PROGRESS NOTE ADULT - PROBLEM SELECTOR PLAN 11
Hx DM, no currently on any medication. A1C 4.8 (7/17/2022).  - SSI w/ tube feeds  - poct q6 given NPO Hx MDD.  - home meds: bupropion 150mg XL

## 2022-07-28 NOTE — PROGRESS NOTE ADULT - PROBLEM SELECTOR PLAN 12
F: none  E: replete to K>4, Mg>2, Phos>2.5  N: per nutrition recs through dobhoff  Ppx: lovenox 30mg qD    Code Status: DNR - MOLST in chart     Dispo: home vs home w/ hospice Hx DM, no currently on any medication. A1C 4.8 (7/17/2022).  - SSI w/ tube feeds  - poct q6 given NPO

## 2022-07-28 NOTE — PROGRESS NOTE ADULT - PROBLEM SELECTOR PLAN 5
Hx CVA and dementia. Home med ASA, plavix. Non verbal bedbound at baseline. Ongoing goals of care conversations with sister health care proxy.  - home meds: asa 81mg & plavix (restart w/ NGT)  - palliative on board  - ethics on board   - neuro consult: swallow function may recover *RESOLVED  UA with pyuria, bacteruria, LE, nitrites. Unable to assess if patient symptomatic however more altered/lethargic from baseline on admission. No leukocytosis, negative lactate, not meeting SIRS criteria.   - s/p zosyn 3.375g q6 IV for Pseudomonal coverage x 5 days (7/18-7/22)  - UCx: Pseudomonas  - BCx: gram + cocci in clusters, PCR coag neg  - BCx repeat: no growth  - Ustrep neg, U legionella pending

## 2022-07-28 NOTE — PROGRESS NOTE ADULT - PROBLEM SELECTOR PLAN 2
Pt s/p CVA, baseline non verbal w/ cognitive impairment and bedbound. Per HHA had been more altered/lethargic from baseline for few days with decreased PO intake likely 2/2 UTI (UA with pyuria, bacteriuria). UCx growing Pseudomonas s/p zosyn treatment 3.375g x5days.  -Pt mental status improved through admission, per HHA appears to be closer to home baseline (A&Ox0, bedbound) except for dysphagia. Pt with decreased PO intake per HHA for few days leading up to admission. Speech and swallow evaluation x3 over duration of pt stay, pt continues to not able to initiate swallow. Dobhoff tube in placed over the weekend, now removed. Pt on maintenance fluids. Continuing goals of care discussion with sister Talita Haney (HCP).  - continue goals of care discussions, palliative on board  - s/p dobhoff tube placement 7/22-7/25  - speech: Discussed importance of oral hygiene and use of moistened swabs for hydration with HHA. Signed off  - nutrition: Align nutrition with goals of care  - maintenance fluids D5 in LR at 40cc/hr  - NPO, aspiration precautions  - POCT q6

## 2022-07-28 NOTE — PROGRESS NOTE ADULT - TIME BILLING
Emotional Support/Supportive Care and Clarification of Potential Disease Trajectory related to CVA, encephalopathy dt structural d/o of brain  Exploration of GOC including advanced directives such as code status  Discharge Facilitation with ongoing coordination
Emotional Support/Supportive Care and Clarification of Potential Disease Trajectory related to frailty  Prescription Medication Management  Discharge Facilitation with ongoing coordination for [facility]
Emotional Support/Supportive Care and Clarification of Potential Disease Trajectory related to  CVA, admitted w acute metabolic encephalopathy
Emotional Support/Supportive Care and Clarification of Potential Disease Trajectory related to frailty  Prescription Medication Management  Discharge Facilitation with ongoing coordination for [facility]
n/a

## 2022-07-28 NOTE — PROGRESS NOTE ADULT - SUBJECTIVE AND OBJECTIVE BOX
O/N Events:    Subjective/ROS: Patient seen and examined at bedside.     Denies Fever/Chills, HA, CP, SOB, n/v, changes in bowel/urinary habits.  12pt ROS otherwise negative.    VITALS  Vital Signs Last 24 Hrs  T(C): 36.7 (28 Jul 2022 06:33), Max: 36.7 (27 Jul 2022 13:00)  T(F): 98 (28 Jul 2022 06:33), Max: 98.1 (27 Jul 2022 13:00)  HR: 86 (28 Jul 2022 06:33) (74 - 86)  BP: 116/78 (28 Jul 2022 06:33) (116/78 - 134/74)  BP(mean): --  RR: 19 (28 Jul 2022 06:33) (18 - 19)  SpO2: 97% (28 Jul 2022 06:33) (97% - 100%)    Parameters below as of 28 Jul 2022 06:33  Patient On (Oxygen Delivery Method): room air        CAPILLARY BLOOD GLUCOSE      POCT Blood Glucose.: 116 mg/dL (28 Jul 2022 05:33)  POCT Blood Glucose.: 121 mg/dL (27 Jul 2022 17:33)  POCT Blood Glucose.: 139 mg/dL (27 Jul 2022 12:06)      PHYSICAL EXAM  General: NAD  Head: NC/AT; MMM; PERRL; EOMI;  Neck: Supple; no JVD  Respiratory: CTAB; no wheezes/rales/rhonchi  Cardiovascular: Regular rhythm/rate; S1/S2+, no murmurs, rubs gallops   Gastrointestinal: Soft; NTND; bowel sounds normal and present  Extremities: WWP; no edema/cyanosis  Neurological: A&Ox3, CNII-XII grossly intact; no obvious focal deficits    MEDICATIONS  (STANDING):  dextrose 5% + lactated ringers. 1000 milliLiter(s) (40 mL/Hr) IV Continuous <Continuous>  enoxaparin Injectable 30 milliGRAM(s) SubCutaneous every 24 hours  scopolamine 1 mG/72 Hr(s) Patch 1 Patch Transdermal every 72 hours    MEDICATIONS  (PRN):  bisacodyl Suppository 10 milliGRAM(s) Rectal daily PRN Constipation  glycopyrrolate Injectable 0.2 milliGRAM(s) IV Push every 6 hours PRN Excessive Secretions  HYDROmorphone  Injectable 0.25 milliGRAM(s) IV Push every 4 hours PRN Severe Pain (7 - 10) or RR >22      morphine (Other)      LABS                        10.5   9.10  )-----------( 375      ( 27 Jul 2022 05:30 )             33.5     07-27    137  |  100  |  11  ----------------------------<  128<H>  4.0   |  25  |  0.47<L>    Ca    10.2      27 Jul 2022 05:30  Phos  3.2     07-27  Mg     1.6     07-27                  IMAGING/EKG/ETC   Patient is a 88y old  Female who presents with a chief complaint of AMS    O/N Events: patient developed a new pressure ulcer and wound care was consulted. Fluids were also reordered.    Subjective/ROS: Patient seen and examined at bedside.     Denies Fever/Chills, HA, CP, SOB, n/v, changes in bowel/urinary habits.  12pt ROS otherwise negative.    VITALS  Vital Signs Last 24 Hrs  T(C): 36.7 (28 Jul 2022 06:33), Max: 36.7 (27 Jul 2022 13:00)  T(F): 98 (28 Jul 2022 06:33), Max: 98.1 (27 Jul 2022 13:00)  HR: 86 (28 Jul 2022 06:33) (74 - 86)  BP: 116/78 (28 Jul 2022 06:33) (116/78 - 134/74)  BP(mean): --  RR: 19 (28 Jul 2022 06:33) (18 - 19)  SpO2: 97% (28 Jul 2022 06:33) (97% - 100%)    Parameters below as of 28 Jul 2022 06:33  Patient On (Oxygen Delivery Method): room air        CAPILLARY BLOOD GLUCOSE      POCT Blood Glucose.: 116 mg/dL (28 Jul 2022 05:33)  POCT Blood Glucose.: 121 mg/dL (27 Jul 2022 17:33)  POCT Blood Glucose.: 139 mg/dL (27 Jul 2022 12:06)      PHYSICAL EXAM  General: NAD  Head: NC/AT; MMM; PERRL; EOMI;  Neck: Supple; no JVD  Respiratory: CTAB; no wheezes/rales/rhonchi  Cardiovascular: Regular rhythm/rate; S1/S2+, no murmurs, rubs gallops   Gastrointestinal: Soft; NTND; bowel sounds normal and present  Extremities: WWP; no edema/cyanosis  Neurological: A&Ox3, CNII-XII grossly intact; no obvious focal deficits    MEDICATIONS  (STANDING):  dextrose 5% + lactated ringers. 1000 milliLiter(s) (40 mL/Hr) IV Continuous <Continuous>  enoxaparin Injectable 30 milliGRAM(s) SubCutaneous every 24 hours  scopolamine 1 mG/72 Hr(s) Patch 1 Patch Transdermal every 72 hours    MEDICATIONS  (PRN):  bisacodyl Suppository 10 milliGRAM(s) Rectal daily PRN Constipation  glycopyrrolate Injectable 0.2 milliGRAM(s) IV Push every 6 hours PRN Excessive Secretions  HYDROmorphone  Injectable 0.25 milliGRAM(s) IV Push every 4 hours PRN Severe Pain (7 - 10) or RR >22      morphine (Other)      LABS                        10.5   9.10  )-----------( 375      ( 27 Jul 2022 05:30 )             33.5     07-27    137  |  100  |  11  ----------------------------<  128<H>  4.0   |  25  |  0.47<L>    Ca    10.2      27 Jul 2022 05:30  Phos  3.2     07-27  Mg     1.6     07-27      IMAGING/EKG/ETC   Patient is a 88y old  Female who presents with a chief complaint of AMS    O/N Events: patient developed a new pressure ulcer and wound care was consulted. Fluids were also reordered.    Subjective/ROS: Patient seen and examined at bedside.     Denies Fever/Chills, HA, CP, SOB, n/v, changes in bowel/urinary habits.  12pt ROS otherwise negative.    VITALS  Vital Signs Last 24 Hrs  T(C): 36.7 (28 Jul 2022 06:33), Max: 36.7 (27 Jul 2022 13:00)  T(F): 98 (28 Jul 2022 06:33), Max: 98.1 (27 Jul 2022 13:00)  HR: 86 (28 Jul 2022 06:33) (74 - 86)  BP: 116/78 (28 Jul 2022 06:33) (116/78 - 134/74)  BP(mean): --  RR: 19 (28 Jul 2022 06:33) (18 - 19)  SpO2: 97% (28 Jul 2022 06:33) (97% - 100%)    Parameters below as of 28 Jul 2022 06:33  Patient On (Oxygen Delivery Method): room air        CAPILLARY BLOOD GLUCOSE      POCT Blood Glucose.: 116 mg/dL (28 Jul 2022 05:33)  POCT Blood Glucose.: 121 mg/dL (27 Jul 2022 17:33)  POCT Blood Glucose.: 139 mg/dL (27 Jul 2022 12:06)      PHYSICAL EXAM  General: NAD, resting in bed, eyes and mouth open, thin, cachectic   Head: NC/AT, anicteric sclera, dry MM, neck stick, mouth open  Neck: Supple; trachea midline  Respiratory: CTAB anteriorly w/ transmitted upper airways sounds; no wheezes, increased work of breathing, retractions  Cardiovascular: RRR; holosystolic murmur, S1 w/ muted S2, no LE edema  Gastrointestinal: Soft abdomen, bowel sounds normal and present, no rebound or gaurding  Extremities: cool extremities; no edema/cyanosis  Vascular: 2+ radial pulses bilaterally  Neurological: A&Ox0, bedbound, contractures in all 4 extremities, stiff neck; does not follow commands; does not answer yes/no questions, no vocalization    MEDICATIONS  (STANDING):  dextrose 5% + lactated ringers. 1000 milliLiter(s) (40 mL/Hr) IV Continuous <Continuous>  enoxaparin Injectable 30 milliGRAM(s) SubCutaneous every 24 hours  scopolamine 1 mG/72 Hr(s) Patch 1 Patch Transdermal every 72 hours    MEDICATIONS  (PRN):  bisacodyl Suppository 10 milliGRAM(s) Rectal daily PRN Constipation  glycopyrrolate Injectable 0.2 milliGRAM(s) IV Push every 6 hours PRN Excessive Secretions  HYDROmorphone  Injectable 0.25 milliGRAM(s) IV Push every 4 hours PRN Severe Pain (7 - 10) or RR >22      morphine (Other)      LABS                        10.5   9.10  )-----------( 375      ( 27 Jul 2022 05:30 )             33.5     07-27    137  |  100  |  11  ----------------------------<  128<H>  4.0   |  25  |  0.47<L>    Ca    10.2      27 Jul 2022 05:30  Phos  3.2     07-27  Mg     1.6     07-27      IMAGING/EKG/ETC

## 2022-07-28 NOTE — PROGRESS NOTE ADULT - PROBLEM SELECTOR PLAN 6
*RESOLVED  No hx of COPD or lung disease, not on home O2. Initially placed on 15 L NRB then transitioned to 5L NC sat 99%. No accessory muscle use or incr WOB. CXR with elevated L hemidiaphragm, possibly positional, no acute infiltrates. CXR with no acute infiltrates, though consider aspiration PNA given dysphagia.  Pt O2 sat well on room air.  - monitor O2 saturations  - RVP neg Hx CVA and dementia. Home med ASA, plavix. Non verbal bedbound at baseline. Ongoing goals of care conversations with sister health care proxy.  - home meds: asa 81mg & plavix (restart w/ NGT)  - palliative on board  - ethics on board   - neuro consult: swallow function may recover

## 2022-07-28 NOTE — PROGRESS NOTE ADULT - PROVIDER SPECIALTY LIST ADULT
Internal Medicine
Neurology
Neurology
Hospitalist
Internal Medicine
Palliative Care
Internal Medicine
Palliative Care
Internal Medicine

## 2022-08-03 DIAGNOSIS — R77.8 OTHER SPECIFIED ABNORMALITIES OF PLASMA PROTEINS: ICD-10-CM

## 2022-08-03 DIAGNOSIS — R53.2 FUNCTIONAL QUADRIPLEGIA: ICD-10-CM

## 2022-08-03 DIAGNOSIS — E43 UNSPECIFIED SEVERE PROTEIN-CALORIE MALNUTRITION: ICD-10-CM
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
## 2022-08-03 DIAGNOSIS — Z88.6 ALLERGY STATUS TO ANALGESIC AGENT: ICD-10-CM

## 2022-08-03 DIAGNOSIS — J96.01 ACUTE RESPIRATORY FAILURE WITH HYPOXIA: ICD-10-CM

## 2022-08-03 DIAGNOSIS — N39.0 URINARY TRACT INFECTION, SITE NOT SPECIFIED: ICD-10-CM

## 2022-08-03 DIAGNOSIS — Z74.01 BED CONFINEMENT STATUS: ICD-10-CM

## 2022-08-03 DIAGNOSIS — K52.1 TOXIC GASTROENTERITIS AND COLITIS: ICD-10-CM

## 2022-08-03 DIAGNOSIS — Z51.5 ENCOUNTER FOR PALLIATIVE CARE: ICD-10-CM

## 2022-08-03 DIAGNOSIS — R13.12 DYSPHAGIA, OROPHARYNGEAL PHASE: ICD-10-CM

## 2022-08-03 DIAGNOSIS — L89.322 PRESSURE ULCER OF LEFT BUTTOCK, STAGE 2: ICD-10-CM

## 2022-08-03 DIAGNOSIS — T47.4X5A ADVERSE EFFECT OF OTHER LAXATIVES, INITIAL ENCOUNTER: ICD-10-CM

## 2022-08-03 DIAGNOSIS — G92.8 OTHER TOXIC ENCEPHALOPATHY: ICD-10-CM

## 2022-08-03 DIAGNOSIS — Z79.82 LONG TERM (CURRENT) USE OF ASPIRIN: ICD-10-CM

## 2022-08-03 DIAGNOSIS — I69.320 APHASIA FOLLOWING CEREBRAL INFARCTION: ICD-10-CM

## 2022-08-03 DIAGNOSIS — Z79.02 LONG TERM (CURRENT) USE OF ANTITHROMBOTICS/ANTIPLATELETS: ICD-10-CM

## 2022-08-03 DIAGNOSIS — I10 ESSENTIAL (PRIMARY) HYPERTENSION: ICD-10-CM

## 2022-08-03 DIAGNOSIS — B96.5 PSEUDOMONAS (AERUGINOSA) (MALLEI) (PSEUDOMALLEI) AS THE CAUSE OF DISEASES CLASSIFIED ELSEWHERE: ICD-10-CM

## 2022-08-03 DIAGNOSIS — Z86.39 PERSONAL HISTORY OF OTHER ENDOCRINE, NUTRITIONAL AND METABOLIC DISEASE: ICD-10-CM

## 2022-08-03 DIAGNOSIS — R62.7 ADULT FAILURE TO THRIVE: ICD-10-CM

## 2022-08-03 DIAGNOSIS — I69.315 COGNITIVE SOCIAL OR EMOTIONAL DEFICIT FOLLOWING CEREBRAL INFARCTION: ICD-10-CM

## 2022-08-03 DIAGNOSIS — D50.9 IRON DEFICIENCY ANEMIA, UNSPECIFIED: ICD-10-CM

## 2022-08-03 DIAGNOSIS — F32.9 MAJOR DEPRESSIVE DISORDER, SINGLE EPISODE, UNSPECIFIED: ICD-10-CM

## 2022-08-03 DIAGNOSIS — E78.5 HYPERLIPIDEMIA, UNSPECIFIED: ICD-10-CM

## 2022-08-03 DIAGNOSIS — F01.50 VASCULAR DEMENTIA WITHOUT BEHAVIORAL DISTURBANCE: ICD-10-CM

## 2022-08-03 DIAGNOSIS — Z66 DO NOT RESUSCITATE: ICD-10-CM

## 2022-08-03 DIAGNOSIS — G93.49 OTHER ENCEPHALOPATHY: ICD-10-CM

## 2022-08-22 NOTE — DIETITIAN INITIAL EVALUATION ADULT - ENERGY INTAKE
NPO Tranexamic Acid Pregnancy And Lactation Text: It is unknown if this medication is safe during pregnancy or breast feeding.

## 2023-02-02 NOTE — STROKE CODE NOTE - IMAGING RESULTS
Please do nasal rinses for the next 2 weeks   Please take over the counter zyrtec   I have prescribed you a stronger antibiotic, please take this once a day for the next 10 days    Non-hemorrhagic

## 2023-04-05 NOTE — PROGRESS NOTE ADULT - PROBLEM SELECTOR PLAN 2
EMT/paramedic Pt s/p CVA, baseline non verbal w/ cognitive impairment and bedbound. Per HHA has been more altered/lethargic from baseline for few days with decreased PO intake likely 2/2 UTI (UA with pyuria, bacteriuria). UCx growing Pseudomonas s/p zosyn treatment 3.375g x5days.    Pt mental status improving through admission. Pt s/p CVA, baseline non verbal w/ cognitive impairment and bedbound. Per HHA had been more altered/lethargic from baseline for few days with decreased PO intake likely 2/2 UTI (UA with pyuria, bacteriuria). UCx growing Pseudomonas s/p zosyn treatment 3.375g x5days.    Pt mental status improving through admission, per HHA appears to be closer to home baseline (A&Ox0, bedbound) except for dysphagia.

## 2023-04-13 NOTE — PROGRESS NOTE ADULT - PROBLEM/PLAN-8
DISPLAY PLAN FREE TEXT
yes

## 2023-06-22 NOTE — ED ADULT NURSE NOTE - EXTENSIONS OF SELF_ADULT
Plan: Liquid nitrogen done today. If biopsy comes back as VV patient is to come back in a year. Detail Level: Zone None

## 2024-01-14 NOTE — PATIENT PROFILE ADULT - HEALTH LITERACY
Attending and PA/NP shared services statement (NON-critical care):
no

## 2024-02-22 NOTE — PROGRESS NOTE ADULT - PROBLEM SELECTOR PLAN 6
no -Pt with positive UA: trace leuk esterase, WBC 5-10, bacteria present  -No complaints of dysuria, no signs of sepsis, afebrile  -WBC decreased from 16.63 to 10.48 this am  -Continue Ceftriaxone 1g q24h for 3 days
